# Patient Record
Sex: MALE | URBAN - METROPOLITAN AREA
[De-identification: names, ages, dates, MRNs, and addresses within clinical notes are randomized per-mention and may not be internally consistent; named-entity substitution may affect disease eponyms.]

---

## 2021-03-11 ENCOUNTER — EMERGENCY (EMERGENCY)
Facility: HOSPITAL | Age: 61
LOS: 1 days | Discharge: HOME | End: 2021-03-11
Attending: EMERGENCY MEDICINE
Payer: MEDICARE

## 2021-03-11 VITALS
HEART RATE: 72 BPM | TEMPERATURE: 98 F | SYSTOLIC BLOOD PRESSURE: 192 MMHG | DIASTOLIC BLOOD PRESSURE: 110 MMHG | RESPIRATION RATE: 22 BRPM | HEIGHT: 67 IN

## 2021-03-11 LAB
ALBUMIN SERPL ELPH-MCNC: 4.1 G/DL — SIGNIFICANT CHANGE UP (ref 3.3–5.2)
ALP SERPL-CCNC: 59 U/L — SIGNIFICANT CHANGE UP (ref 40–120)
ALT FLD-CCNC: 20 U/L — SIGNIFICANT CHANGE UP
ANION GAP SERPL CALC-SCNC: 14 MMOL/L — SIGNIFICANT CHANGE UP (ref 5–17)
AST SERPL-CCNC: 37 U/L — SIGNIFICANT CHANGE UP
BASOPHILS # BLD AUTO: 0.04 K/UL — SIGNIFICANT CHANGE UP (ref 0–0.2)
BASOPHILS NFR BLD AUTO: 0.5 % — SIGNIFICANT CHANGE UP (ref 0–2)
BILIRUB SERPL-MCNC: 0.6 MG/DL — SIGNIFICANT CHANGE UP (ref 0.4–2)
BUN SERPL-MCNC: 21 MG/DL — HIGH (ref 8–20)
CALCIUM SERPL-MCNC: 8.8 MG/DL — SIGNIFICANT CHANGE UP (ref 8.6–10.2)
CHLORIDE SERPL-SCNC: 101 MMOL/L — SIGNIFICANT CHANGE UP (ref 98–107)
CO2 SERPL-SCNC: 27 MMOL/L — SIGNIFICANT CHANGE UP (ref 22–29)
CREAT SERPL-MCNC: 1.2 MG/DL — SIGNIFICANT CHANGE UP (ref 0.5–1.3)
D DIMER BLD IA.RAPID-MCNC: 323 NG/ML DDU — HIGH
EOSINOPHIL # BLD AUTO: 0.11 K/UL — SIGNIFICANT CHANGE UP (ref 0–0.5)
EOSINOPHIL NFR BLD AUTO: 1.4 % — SIGNIFICANT CHANGE UP (ref 0–6)
GLUCOSE SERPL-MCNC: 114 MG/DL — HIGH (ref 70–99)
HCT VFR BLD CALC: 47.3 % — SIGNIFICANT CHANGE UP (ref 39–50)
HGB BLD-MCNC: 15.6 G/DL — SIGNIFICANT CHANGE UP (ref 13–17)
IMM GRANULOCYTES NFR BLD AUTO: 0.5 % — SIGNIFICANT CHANGE UP (ref 0–1.5)
LYMPHOCYTES # BLD AUTO: 0.92 K/UL — LOW (ref 1–3.3)
LYMPHOCYTES # BLD AUTO: 11.4 % — LOW (ref 13–44)
MAGNESIUM SERPL-MCNC: 1.9 MG/DL — SIGNIFICANT CHANGE UP (ref 1.6–2.6)
MCHC RBC-ENTMCNC: 29.6 PG — SIGNIFICANT CHANGE UP (ref 27–34)
MCHC RBC-ENTMCNC: 33 GM/DL — SIGNIFICANT CHANGE UP (ref 32–36)
MCV RBC AUTO: 89.8 FL — SIGNIFICANT CHANGE UP (ref 80–100)
MONOCYTES # BLD AUTO: 0.76 K/UL — SIGNIFICANT CHANGE UP (ref 0–0.9)
MONOCYTES NFR BLD AUTO: 9.4 % — SIGNIFICANT CHANGE UP (ref 2–14)
NEUTROPHILS # BLD AUTO: 6.2 K/UL — SIGNIFICANT CHANGE UP (ref 1.8–7.4)
NEUTROPHILS NFR BLD AUTO: 76.8 % — SIGNIFICANT CHANGE UP (ref 43–77)
NT-PROBNP SERPL-SCNC: 956 PG/ML — HIGH (ref 0–300)
PLATELET # BLD AUTO: 213 K/UL — SIGNIFICANT CHANGE UP (ref 150–400)
POTASSIUM SERPL-MCNC: 3.6 MMOL/L — SIGNIFICANT CHANGE UP (ref 3.5–5.3)
POTASSIUM SERPL-SCNC: 3.6 MMOL/L — SIGNIFICANT CHANGE UP (ref 3.5–5.3)
PROT SERPL-MCNC: 7.1 G/DL — SIGNIFICANT CHANGE UP (ref 6.6–8.7)
RBC # BLD: 5.27 M/UL — SIGNIFICANT CHANGE UP (ref 4.2–5.8)
RBC # FLD: 13.4 % — SIGNIFICANT CHANGE UP (ref 10.3–14.5)
SODIUM SERPL-SCNC: 142 MMOL/L — SIGNIFICANT CHANGE UP (ref 135–145)
TROPONIN T SERPL-MCNC: <0.01 NG/ML — SIGNIFICANT CHANGE UP (ref 0–0.06)
WBC # BLD: 8.07 K/UL — SIGNIFICANT CHANGE UP (ref 3.8–10.5)
WBC # FLD AUTO: 8.07 K/UL — SIGNIFICANT CHANGE UP (ref 3.8–10.5)

## 2021-03-11 PROCEDURE — 99285 EMERGENCY DEPT VISIT HI MDM: CPT | Mod: CS

## 2021-03-11 PROCEDURE — 71045 X-RAY EXAM CHEST 1 VIEW: CPT | Mod: 26

## 2021-03-11 PROCEDURE — 93010 ELECTROCARDIOGRAM REPORT: CPT

## 2021-03-11 NOTE — ED PROVIDER NOTE - OBJECTIVE STATEMENT
60yom w/ paroxysmal AF, CHF, HTN, p/w palpitations. Pt was standing at the bus stop waiting for his bus and had a sudden onset of palpitations in the chest with shortness of breath and lightheadedness. Symptoms provoked by life stress, he reports. Lasted somewhere around and hour and resolved after taking his prescribed metoprolol. He denies any symptoms currently.

## 2021-03-11 NOTE — ED PROVIDER NOTE - PATIENT PORTAL LINK FT
You can access the FollowMyHealth Patient Portal offered by [facility] by registering at the following website: [email or web address]. By joining "[employer]’s FollowMyHealth portal, you will also be able to view your health information using other applications (apps) compatible with our system.

## 2021-03-11 NOTE — ED ADULT TRIAGE NOTE - CHIEF COMPLAINT QUOTE
pt states he was standing waiting for a bus and felt his heart was racing. pt states he got a little lightheaded when his heart was racing with acid reflex.

## 2021-03-11 NOTE — ED PROVIDER NOTE - NSFOLLOWUPINSTRUCTIONS_ED_ALL_ED_FT
Follow up with your cardiologist as soon as possible  Continue your home medications as prescribed.   Return to the ER with any new, worsening or persistent symptoms.

## 2021-03-11 NOTE — ED PROVIDER NOTE - CLINICAL SUMMARY MEDICAL DECISION MAKING FREE TEXT BOX
Resolved episode of palpitations, in sinus now and asymptomatic. ECG non-ischemic appearing, troponin negative x 2. Possibly had an episode of paroxysmal AF and self treated with metoprolol. Will follow up with his cardiologist.

## 2021-03-12 ENCOUNTER — EMERGENCY (EMERGENCY)
Facility: HOSPITAL | Age: 61
LOS: 1 days | Discharge: HOME | End: 2021-03-12
Attending: EMERGENCY MEDICINE
Payer: MEDICARE

## 2021-03-12 VITALS
SYSTOLIC BLOOD PRESSURE: 169 MMHG | RESPIRATION RATE: 16 BRPM | DIASTOLIC BLOOD PRESSURE: 108 MMHG | WEIGHT: 220.02 LBS | TEMPERATURE: 100 F | HEART RATE: 95 BPM | HEIGHT: 67 IN | OXYGEN SATURATION: 96 %

## 2021-03-12 VITALS
OXYGEN SATURATION: 98 % | RESPIRATION RATE: 20 BRPM | HEART RATE: 72 BPM | SYSTOLIC BLOOD PRESSURE: 155 MMHG | DIASTOLIC BLOOD PRESSURE: 85 MMHG

## 2021-03-12 LAB
ALBUMIN SERPL ELPH-MCNC: 3.9 G/DL — SIGNIFICANT CHANGE UP (ref 3.3–5.2)
ALP SERPL-CCNC: 56 U/L — SIGNIFICANT CHANGE UP (ref 40–120)
ALT FLD-CCNC: 22 U/L — SIGNIFICANT CHANGE UP
ANION GAP SERPL CALC-SCNC: 20 MMOL/L — HIGH (ref 5–17)
AST SERPL-CCNC: 44 U/L — HIGH
BASOPHILS # BLD AUTO: 0.02 K/UL — SIGNIFICANT CHANGE UP (ref 0–0.2)
BASOPHILS NFR BLD AUTO: 0.3 % — SIGNIFICANT CHANGE UP (ref 0–2)
BILIRUB SERPL-MCNC: 0.6 MG/DL — SIGNIFICANT CHANGE UP (ref 0.4–2)
BUN SERPL-MCNC: 18 MG/DL — SIGNIFICANT CHANGE UP (ref 8–20)
CALCIUM SERPL-MCNC: 8.8 MG/DL — SIGNIFICANT CHANGE UP (ref 8.6–10.2)
CHLORIDE SERPL-SCNC: 102 MMOL/L — SIGNIFICANT CHANGE UP (ref 98–107)
CO2 SERPL-SCNC: 22 MMOL/L — SIGNIFICANT CHANGE UP (ref 22–29)
CREAT SERPL-MCNC: 0.98 MG/DL — SIGNIFICANT CHANGE UP (ref 0.5–1.3)
EOSINOPHIL # BLD AUTO: 0.12 K/UL — SIGNIFICANT CHANGE UP (ref 0–0.5)
EOSINOPHIL NFR BLD AUTO: 2 % — SIGNIFICANT CHANGE UP (ref 0–6)
GLUCOSE SERPL-MCNC: 111 MG/DL — HIGH (ref 70–99)
HCT VFR BLD CALC: 44.5 % — SIGNIFICANT CHANGE UP (ref 39–50)
HGB BLD-MCNC: 14.7 G/DL — SIGNIFICANT CHANGE UP (ref 13–17)
IMM GRANULOCYTES NFR BLD AUTO: 0.7 % — SIGNIFICANT CHANGE UP (ref 0–1.5)
LYMPHOCYTES # BLD AUTO: 0.7 K/UL — LOW (ref 1–3.3)
LYMPHOCYTES # BLD AUTO: 11.9 % — LOW (ref 13–44)
MCHC RBC-ENTMCNC: 29.8 PG — SIGNIFICANT CHANGE UP (ref 27–34)
MCHC RBC-ENTMCNC: 33 GM/DL — SIGNIFICANT CHANGE UP (ref 32–36)
MCV RBC AUTO: 90.3 FL — SIGNIFICANT CHANGE UP (ref 80–100)
MONOCYTES # BLD AUTO: 0.52 K/UL — SIGNIFICANT CHANGE UP (ref 0–0.9)
MONOCYTES NFR BLD AUTO: 8.8 % — SIGNIFICANT CHANGE UP (ref 2–14)
NEUTROPHILS # BLD AUTO: 4.48 K/UL — SIGNIFICANT CHANGE UP (ref 1.8–7.4)
NEUTROPHILS NFR BLD AUTO: 76.3 % — SIGNIFICANT CHANGE UP (ref 43–77)
NT-PROBNP SERPL-SCNC: 753 PG/ML — HIGH (ref 0–300)
PLATELET # BLD AUTO: 205 K/UL — SIGNIFICANT CHANGE UP (ref 150–400)
POTASSIUM SERPL-MCNC: 4.2 MMOL/L — SIGNIFICANT CHANGE UP (ref 3.5–5.3)
POTASSIUM SERPL-SCNC: 4.2 MMOL/L — SIGNIFICANT CHANGE UP (ref 3.5–5.3)
PROT SERPL-MCNC: 6.9 G/DL — SIGNIFICANT CHANGE UP (ref 6.6–8.7)
RBC # BLD: 4.93 M/UL — SIGNIFICANT CHANGE UP (ref 4.2–5.8)
RBC # FLD: 13.4 % — SIGNIFICANT CHANGE UP (ref 10.3–14.5)
SARS-COV-2 RNA SPEC QL NAA+PROBE: SIGNIFICANT CHANGE UP
SODIUM SERPL-SCNC: 143 MMOL/L — SIGNIFICANT CHANGE UP (ref 135–145)
T4 AB SER-ACNC: 3.6 UG/DL — LOW (ref 4.5–12)
TROPONIN T SERPL-MCNC: <0.01 NG/ML — SIGNIFICANT CHANGE UP (ref 0–0.06)
TSH SERPL-MCNC: 2.05 UIU/ML — SIGNIFICANT CHANGE UP (ref 0.27–4.2)
TSH SERPL-MCNC: 3.23 UIU/ML — SIGNIFICANT CHANGE UP (ref 0.27–4.2)
WBC # BLD: 5.88 K/UL — SIGNIFICANT CHANGE UP (ref 3.8–10.5)
WBC # FLD AUTO: 5.88 K/UL — SIGNIFICANT CHANGE UP (ref 3.8–10.5)

## 2021-03-12 PROCEDURE — 85379 FIBRIN DEGRADATION QUANT: CPT

## 2021-03-12 PROCEDURE — 71045 X-RAY EXAM CHEST 1 VIEW: CPT | Mod: 26

## 2021-03-12 PROCEDURE — 93971 EXTREMITY STUDY: CPT | Mod: 26,LT

## 2021-03-12 PROCEDURE — U0003: CPT

## 2021-03-12 PROCEDURE — 99284 EMERGENCY DEPT VISIT MOD MDM: CPT | Mod: 25

## 2021-03-12 PROCEDURE — 80053 COMPREHEN METABOLIC PANEL: CPT

## 2021-03-12 PROCEDURE — 93005 ELECTROCARDIOGRAM TRACING: CPT

## 2021-03-12 PROCEDURE — 36415 COLL VENOUS BLD VENIPUNCTURE: CPT

## 2021-03-12 PROCEDURE — 99284 EMERGENCY DEPT VISIT MOD MDM: CPT

## 2021-03-12 PROCEDURE — 71045 X-RAY EXAM CHEST 1 VIEW: CPT

## 2021-03-12 PROCEDURE — 93880 EXTRACRANIAL BILAT STUDY: CPT | Mod: 26

## 2021-03-12 PROCEDURE — U0005: CPT

## 2021-03-12 PROCEDURE — 85025 COMPLETE CBC W/AUTO DIFF WBC: CPT

## 2021-03-12 PROCEDURE — 93010 ELECTROCARDIOGRAM REPORT: CPT

## 2021-03-12 PROCEDURE — 84484 ASSAY OF TROPONIN QUANT: CPT

## 2021-03-12 PROCEDURE — 99220: CPT | Mod: GC

## 2021-03-12 PROCEDURE — 83735 ASSAY OF MAGNESIUM: CPT

## 2021-03-12 PROCEDURE — 93306 TTE W/DOPPLER COMPLETE: CPT | Mod: 26

## 2021-03-12 PROCEDURE — 83880 ASSAY OF NATRIURETIC PEPTIDE: CPT

## 2021-03-12 RX ORDER — METOPROLOL TARTRATE 50 MG
100 TABLET ORAL ONCE
Refills: 0 | Status: COMPLETED | OUTPATIENT
Start: 2021-03-12 | End: 2021-03-12

## 2021-03-12 RX ORDER — METOPROLOL TARTRATE 50 MG
100 TABLET ORAL DAILY
Refills: 0 | Status: DISCONTINUED | OUTPATIENT
Start: 2021-03-12 | End: 2021-03-12

## 2021-03-12 RX ORDER — CLONAZEPAM 1 MG
0.5 TABLET ORAL EVERY 12 HOURS
Refills: 0 | Status: DISCONTINUED | OUTPATIENT
Start: 2021-03-12 | End: 2021-03-13

## 2021-03-12 RX ORDER — SACUBITRIL AND VALSARTAN 24; 26 MG/1; MG/1
1 TABLET, FILM COATED ORAL
Refills: 0 | Status: DISCONTINUED | OUTPATIENT
Start: 2021-03-12 | End: 2021-03-16

## 2021-03-12 RX ORDER — ATORVASTATIN CALCIUM 80 MG/1
20 TABLET, FILM COATED ORAL AT BEDTIME
Refills: 0 | Status: DISCONTINUED | OUTPATIENT
Start: 2021-03-12 | End: 2021-03-16

## 2021-03-12 RX ORDER — METOPROLOL TARTRATE 50 MG
200 TABLET ORAL DAILY
Refills: 0 | Status: DISCONTINUED | OUTPATIENT
Start: 2021-03-13 | End: 2021-03-16

## 2021-03-12 RX ORDER — APIXABAN 2.5 MG/1
5 TABLET, FILM COATED ORAL EVERY 12 HOURS
Refills: 0 | Status: DISCONTINUED | OUTPATIENT
Start: 2021-03-12 | End: 2021-03-16

## 2021-03-12 RX ORDER — SERTRALINE 25 MG/1
25 TABLET, FILM COATED ORAL
Refills: 0 | Status: DISCONTINUED | OUTPATIENT
Start: 2021-03-12 | End: 2021-03-16

## 2021-03-12 RX ORDER — FUROSEMIDE 40 MG
40 TABLET ORAL DAILY
Refills: 0 | Status: DISCONTINUED | OUTPATIENT
Start: 2021-03-12 | End: 2021-03-16

## 2021-03-12 RX ORDER — POTASSIUM CHLORIDE 20 MEQ
20 PACKET (EA) ORAL DAILY
Refills: 0 | Status: DISCONTINUED | OUTPATIENT
Start: 2021-03-12 | End: 2021-03-16

## 2021-03-12 RX ADMIN — Medication 100 MILLIGRAM(S): at 18:09

## 2021-03-12 RX ADMIN — Medication 20 MILLIEQUIVALENT(S): at 18:07

## 2021-03-12 RX ADMIN — Medication 40 MILLIGRAM(S): at 18:08

## 2021-03-12 RX ADMIN — Medication 0.5 MILLIGRAM(S): at 18:10

## 2021-03-12 RX ADMIN — SERTRALINE 25 MILLIGRAM(S): 25 TABLET, FILM COATED ORAL at 22:07

## 2021-03-12 NOTE — ED ADULT NURSE NOTE - OBJECTIVE STATEMENT
Pt received A&Ox4 c/o sudden onset of palpitations in generalized chest associated with SOB & lightheadedness. Pt denies any chest pain, HA, dizziness, NVD,  symptoms. Pt placed on CM w/. Respirations even & unlabored. NAD present.

## 2021-03-12 NOTE — ED CDU PROVIDER INITIAL DAY NOTE - PROGRESS NOTE DETAILS
Pt resting comfortably, denies cp or palpitations at this time. US carotids unremarkable, US duplex negative for dvt, results d/w pt. Pt requesting social work be contacted regarding his housing issue. I spoke to Edith ARBOLEDA who states NKECH[facility] is aware and she will sign patient off to morning  as well.

## 2021-03-12 NOTE — ED ADULT NURSE REASSESSMENT NOTE - NSIMPLEMENTINTERV_GEN_ALL_ED
Implemented All Universal Safety Interventions:  [place] to call system. Call bell, personal items and telephone within reach. Instruct patient to call for assistance. Room bathroom lighting operational. Non-slip footwear when patient is off stretcher. Physically safe environment: no spills, clutter or unnecessary equipment. Stretcher in lowest position, wheels locked, appropriate side rails in place.

## 2021-03-12 NOTE — CONSULT NOTE ADULT - ASSESSMENT
59 y/o male with PMH of alcoholism (one pint vodka per day) obesity Depression, active smoker, Htn, HLD, CAD, Cardiomyopathy/CHF. Atrial fib who presents to ER last evening with complaints of chest pain described as palpitations.  He was feeling better and went home only to represent with palpitations which lasted one hour.  He felt lightheaded with the event but no syncope.   He states he feels fine now States he has been compliant with all his meds except only taking 1/2 of Eliquis due to fear of GI bleeding.  He walks up a flight of stairs with groceries and does not get sob or chestpain.    Atypical chest Pain/ Palpitations  likely recurrent atrial fib  Restart eliquis at 5 bid  would check TSH  b/p last night was 190/110  now 169/84  Patient did take meds this am  hr in 70-80  would give additional dose of Toprol 100 now and increase daily dose to 200mg qd  Trying to get records from [place]   Obtain echo  hold in CDU if echo ok , b/p ok and no further arrhythmia d/c tomorrow    CHF/CARDIOMYOPATHY  c/w entresto, Lasix  low na diet    HTN  would increase toprol to 200 qd  Low na diet    ETOH ABUSE/TOBACCO USE  discussed etoh and tobacco use with patient and benefits of cessation    PLEASE HAVE  SEE HIM PRIOR TO D/C     61 y/o male with PMH of alcoholism (one pint vodka per day) obesity Depression, active smoker, Htn, HLD,  Mild CAD, Cardiomyopathy/CHF. Severe Pul htn Atrial fib who presents to ER last evening with complaints of chest pain described as palpitations.  He was feeling better and went home only to represent with palpitations which lasted one hour.  He felt lightheaded with the event but no syncope.   He states he feels fine now States he has been compliant with all his meds except only taking 1/2 of Eliquis due to fear of GI bleeding.  He walks up a flight of stairs with groceries and does not get sob or chestpain.    ATYPICAL CHEST PAIN/PALPITATIONS  Trop neg x 3  likely recurrent atrial fib  Restart eliquis at 5 bid  would check TSH  b/p last night was 190/110  now 169/84  Patient did take meds this am  hr in 70-80  would give additional dose of Toprol 100 now and increase daily dose to 200mg qd  Trying to get records from [place]   Obtain echo  hold in CDU if echo ok , b/p ok and no further arrhythmia d/c tomorrow    CHF/CARDIOMYOPATHY  c/w entresto, Lasix  low na diet    HTN  extr toprol 100 now  would increase toprol to 200 qd starting in am  Low na diet    ETOH ABUSE/TOBACCO USE  discussed etoh and tobacco use with patient and benefits of cessation    PLEASE HAVE  SEE HIM PRIOR TO D/C

## 2021-03-12 NOTE — ED ADULT NURSE NOTE - CAS ELECT INFOMATION PROVIDED
DC instructions provided and reviewed with patient by JAJA Quiroga. VSS. Ambulatory. Patient in understanding of all dc instructions. Patient with no further questions for the RN./DC instructions

## 2021-03-12 NOTE — ED PROVIDER NOTE - ATTENDING CONTRIBUTION TO CARE
Pertinent PMH/PSH/FHx/SHx and Review of Systems contained within:  Patient presents to the ED for episode of palpations with associated lightheadedness.  Patient here last night for the same.  Work up without acute abnormality at that time and patient discharged.  PMH of parox afib, CHF, HLD, HTN on eliquis.  Only takes "half the pill" because of distant GI bleed (patient chose to do this without medical guidance).  VSS.  Today with repeat episode.  Non toxic.  Well appearing. No aggravating or relieving factors. No other pertinent PMH.  No other pertinent PSH.  No other pertinent FHx.  Patient denies EtOH/tobacco/illicit substance use. No fever/chills, No photophobia/eye pain/changes in vision, No ear pain/sore throat/dysphagia, No chest pain, no SOB/cough/wheeze/stridor, No abdominal pain, No N/V/D, no dysuria/frequency/discharge, No neck/back pain, no rash, no changes in neurological status/function.     Gen: Alert, NAD  Head: NC, AT, PERRL, EOMI, normal lids/conjunctiva  ENT: normal hearing, patent oropharynx without erythema/exudate, uvula midline  Neck: +supple, no tenderness/meningismus/JVD, +Trachea midline  Pulm: Bilateral BS, normal resp effort, no wheeze/stridor/retractions  CV: RRR, no M/R/G, +dist pulses  Abd: soft, NT/ND, +BS, no hepatosplenomegaly  Mskel: no edema/erythema/cyanosis  Skin: no rash  Neuro: AAOx3, no gross sensory/motor deficits,    Patient with second episode of palpitations with near syncope with known hx of parox afib.  No afib in ED.  Lab values do not require emergent intervention. COVID negative from swab on 3/11.  d/w Dr. Allen and will transfer to obs for cardio consult and echo with continuous telemetry for pathologic rhythm.  VSS.  Uneventful ED observation period.

## 2021-03-12 NOTE — ED CDU PROVIDER INITIAL DAY NOTE - PMH
Atrial fibrillation    CHF (congestive heart failure)    Depression, unspecified depression type    HTN (hypertension)
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
## 2021-03-12 NOTE — ED PROVIDER NOTE - OBJECTIVE STATEMENT
59yo M w/pmh CHF, afib on Eliquis, HTN, HLD presents with palpitations. Reports pounding in his chest starting at 9am, severe for a few minutes, improved but lingering for the past 2 hours. Assoc/w diaphoresis. Denies f/ch, cough, SOB, n/v, abdominal pain, leg swelling. Presented yesterday for the same symptoms, denies any similar symptoms prior to yesterday. Reports this is different from his afib symptoms, for which he presented a year ago with dizziness and syncope. Denies any hx of dvt/pe, recent travel, recent surgery. 61yo M w/pmh CHF, afib on Eliquis, HTN, HLD presents with palpitations. Reports pounding in his chest starting at 9am, severe for a few minutes, improved but lingering for the past 2 hours. Assoc/w lightheadedness and diaphoresis. Denies f/ch, cough, SOB, n/v, abdominal pain, leg swelling. Presented yesterday for the same symptoms, denies any similar symptoms prior to yesterday. Reports this is different from his afib symptoms, for which he presented a year ago with dizziness and syncope. Denies any hx of dvt/pe, recent travel, recent surgery.

## 2021-03-12 NOTE — CONSULT NOTE ADULT - ATTENDING COMMENTS
60M history significant for chronic active smoker and EtOH abuse (drinks daily) with reported HFrEF (on Entresto), pAfib (self decided to take only half dose Eliquis due to prior GI bleed/ulcer 6 months ago at [place] s/p cautery?), reports only does telephone visit with an outside cardiologist (Dr. Akash Hinson?) last phone visit >8 months ago, presents to ER last evening with L-sided chest "beating strong" and return back this afternoon for the same, serial Trop and EKG negative, monitored in observation,doubt compliance with meds as with /108.   -advised need for alcohol abstinent given reported HFrEF and pAfib; CIWA at risk for withdrawal  -await records from [place] if available  -can obtain TTE for structural heart function, clinical exam and pBNP without acute CHF- continue home meds of Entresto, metoprolol succinate and PO Lasix, if LV EF <40% can add spironolactone 25mg as well, uptitrate Entresto dose as BP allows  -advised to resume full dose BID Eliquis as H/H stable   -if no significant arrhythmia on telemetry in observation for 24hs then discharge home to establish care with our office If he chooses to      Ji Surendra Patel DO, [facility]  Faculty Non-Invasive Cardiologist  [phone]

## 2021-03-12 NOTE — ED CDU PROVIDER INITIAL DAY NOTE - MEDICAL DECISION MAKING DETAILS
59yo M w/pmh CHF, afib on Eliquis, HTN, HLD , and anxiety and depression presents with palpitations and left side of the chest pounding . Reports pounding in his chest starting at 9am, severe for a few minutes. pt is comes in ER for second time   consult [facility] card since pt's cardiology dose not  come n [facility]   trop at 1700  home med   telemetry   doppler carotid and LLE doppler ,   Echo

## 2021-03-12 NOTE — ED CDU PROVIDER INITIAL DAY NOTE - PHYSICAL EXAMINATION
Const: AOX3 nontoxic appearing, no apparent respiratory or physical distress. mild anxious   HEENT: NC/AT. Moist mucous membranee  Eyes: TRUPTI. EOMI  Neck: Soft and supple. Full ROM without pain.  Cardiac: Regular rate and regular rhythm. +S1/S2. No murmurs. mild left LE mild swollen minimal compare to the right no calf TTP .  Resp: Speaking in full sentences. No evidence of respiratory distress. No wheezes, rales or rhonchi. No adventitious breath sounds   Abd: Soft, non-tender, non-distended. Normal bowel sounds in all 4 quadrants. No guarding or rebound.  Back: Spine midline and non-tender. No CVAT.  Skin: No rashes, abrasions or lacerations.  Lymph: No cervical lymphadenopathy.  Neuro: Awake, alert & oriented x 3. Moves all extremities symmetrically.
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
## 2021-03-12 NOTE — ED ADULT NURSE NOTE - OBJECTIVE STATEMENT
Pt A&OX3, amb ad yari, c/o chest pain aprox at 1000 today with mild sob.  Pt states he is very stressed out due to living situation.  Pt Pt A&OX3, amb ad yari, c/o chest pain aprox at 1000 today with mild sob.  Pt states he is very stressed out due to living situation.  CM in place, NSR.  VSS.  Clear bsb, abd soft nondistended, nontender, moving all ext well.

## 2021-03-12 NOTE — ED PROVIDER NOTE - CLINICAL SUMMARY MEDICAL DECISION MAKING FREE TEXT BOX
61yo M w/pmh CHF, afib, HTN, HLD presents with palpitations and diaphoresis, denies CP or SOB, benign exam. EKG shows NSR. Labs, CXR pending.

## 2021-03-12 NOTE — ED ADULT NURSE NOTE - NS ED NURSE RECORD ANOTHER VITAL SIGN
Future Appointments       Provider Department Center    7/1/2019 11:05 AM Emily Rowan P.A.-C. [facility]    7/25/2019 8:45 AM Ghazala Polk [facility]    10/21/2019 8:05 AM Emily Rowan P.A.-C. [facility]        ESTABLISHED PATIENT PRE-VISIT PLANNING     Patient was contacted to complete PVP.     Note: Patient will not be contacted if there is no indication to call.     1.  Reviewed notes from the last few office visits within the medical group: Yes    2.  If any orders were placed at last visit or intended to be done for this visit (i.e. 6 mos follow-up), do we have Results/Consult Notes?        •  Labs - Labs ordered, NOT completed. Patient advised to complete prior to next appointment.   Note: If patient appointment is for lab review and patient did not complete labs, check with provider if OK to reschedule patient until labs completed.       •  Imaging - Imaging was not ordered at last office visit.       •  Referrals - No referrals were ordered at last office visit.    3. Is this appointment scheduled as a Hospital Follow-Up? No    4.  Immunizations were updated in Epic using WebIZ?: No WebIZ record       •  Web Iz Recommendations: HEPATITIS B, PREVNAR (PCV13) , TDAP and SHINGRIX (Shingles)    5.  Patient is due for the following Health Maintenance Topics:   Health Maintenance Due   Topic Date Due   • IMM HEP B VACCINE (1 of 3 - Risk 3-dose series) 05/09/1964   • IMM DTaP/Tdap/Td Vaccine (1 - Tdap) 05/09/1964   • IMM ZOSTER VACCINES (1 of 2) 05/09/1994   • IMM PNEUMOCOCCAL(1 of 2 - PCV13) 05/09/2009   • SERUM CREATININE  04/03/2019       6. Orders for overdue Health Maintenance topics pended in Pre-Charting? YES    7.  AHA (MDX) form printed for Provider? No, already completed    8.  Patient was NOT informed to arrive 15 min prior to their scheduled appointment and bring in their medication  bottles.   Yes

## 2021-03-12 NOTE — ED CDU PROVIDER INITIAL DAY NOTE - ATTENDING CONTRIBUTION TO CARE
I, Tai Khan, performed a face to face bedside interview with this patient regarding history of present illness, review of symptoms and relevant past medical, social and family history.  I completed an independent physical examination. I have communicated the patient’s plan of care and disposition with the ACP.  60 year old male with PMH afib on eliquis, htn, chf presents with chest pain and palpitations. States Sx started last night, episode of heart racing, chest discomfort and near syncope, seen here, trop neg x 2 and discharged. Sx recurred again today and he returns. no chest pain currently. placed on obs for tele, echo, cardio consult  Gen: NAD, well appearing  CV: RRR  Pul: CTA b/l  Abd: Soft, non-distended, non-tender  Neuro: no focal deficits

## 2021-03-12 NOTE — ED CDU PROVIDER INITIAL DAY NOTE - NS ED ROS FT
· Review of Systems: Constitutional: no fever, sweats, and no chills.  	CV: no chest pain, no edema. +palpitations  	Resp: no cough, no dyspnea  	GI: no abdominal pain, no nausea and no vomiting.  	MSK: no msk pain, no weakness  	Skin: no lesions, and no rashes.  	Neuro: no LOC, no headache, no sensory deficits, and +lightheadedness  ROS otherwise negative except as noted in HPI.

## 2021-03-12 NOTE — ED PROVIDER NOTE - NS ED ROS FT
Constitutional: no fever, sweats, and no chills.  CV: no chest pain, no edema. +palpitations  Resp: no cough, no dyspnea  GI: no abdominal pain, no nausea and no vomiting.  MSK: no msk pain, no weakness  Skin: no lesions, and no rashes.  Neuro: no LOC, no headache, no sensory deficits, and no dizziness  ROS otherwise negative except as noted in HPI. Constitutional: no fever, sweats, and no chills.  CV: no chest pain, no edema. +palpitations  Resp: no cough, no dyspnea  GI: no abdominal pain, no nausea and no vomiting.  MSK: no msk pain, no weakness  Skin: no lesions, and no rashes.  Neuro: no LOC, no headache, no sensory deficits, and +lightheadedness  ROS otherwise negative except as noted in HPI.

## 2021-03-13 VITALS
HEART RATE: 68 BPM | OXYGEN SATURATION: 95 % | TEMPERATURE: 98 F | DIASTOLIC BLOOD PRESSURE: 93 MMHG | RESPIRATION RATE: 18 BRPM | SYSTOLIC BLOOD PRESSURE: 159 MMHG

## 2021-03-13 PROCEDURE — 93005 ELECTROCARDIOGRAM TRACING: CPT

## 2021-03-13 PROCEDURE — 80053 COMPREHEN METABOLIC PANEL: CPT

## 2021-03-13 PROCEDURE — 84436 ASSAY OF TOTAL THYROXINE: CPT

## 2021-03-13 PROCEDURE — 84443 ASSAY THYROID STIM HORMONE: CPT

## 2021-03-13 PROCEDURE — G0378: CPT

## 2021-03-13 PROCEDURE — 84484 ASSAY OF TROPONIN QUANT: CPT

## 2021-03-13 PROCEDURE — 93880 EXTRACRANIAL BILAT STUDY: CPT

## 2021-03-13 PROCEDURE — 99215 OFFICE O/P EST HI 40 MIN: CPT

## 2021-03-13 PROCEDURE — 99217: CPT

## 2021-03-13 PROCEDURE — 99284 EMERGENCY DEPT VISIT MOD MDM: CPT | Mod: 25

## 2021-03-13 PROCEDURE — 71045 X-RAY EXAM CHEST 1 VIEW: CPT

## 2021-03-13 PROCEDURE — 93971 EXTREMITY STUDY: CPT

## 2021-03-13 PROCEDURE — U0003: CPT

## 2021-03-13 PROCEDURE — 93306 TTE W/DOPPLER COMPLETE: CPT

## 2021-03-13 PROCEDURE — 36000 PLACE NEEDLE IN VEIN: CPT | Mod: XU

## 2021-03-13 PROCEDURE — 36415 COLL VENOUS BLD VENIPUNCTURE: CPT

## 2021-03-13 PROCEDURE — 83880 ASSAY OF NATRIURETIC PEPTIDE: CPT

## 2021-03-13 PROCEDURE — U0005: CPT

## 2021-03-13 PROCEDURE — 85025 COMPLETE CBC W/AUTO DIFF WBC: CPT

## 2021-03-13 RX ORDER — APIXABAN 2.5 MG/1
1 TABLET, FILM COATED ORAL
Qty: 60 | Refills: 0
Start: 2021-03-13 | End: 2021-04-11

## 2021-03-13 RX ORDER — METOPROLOL TARTRATE 50 MG
1 TABLET ORAL
Qty: 30 | Refills: 0
Start: 2021-03-13 | End: 2021-04-11

## 2021-03-13 RX ORDER — SACUBITRIL AND VALSARTAN 24; 26 MG/1; MG/1
1 TABLET, FILM COATED ORAL
Qty: 60 | Refills: 0
Start: 2021-03-13 | End: 2021-04-11

## 2021-03-13 RX ADMIN — Medication 0.5 MILLIGRAM(S): at 07:28

## 2021-03-13 RX ADMIN — Medication 40 MILLIGRAM(S): at 05:21

## 2021-03-13 RX ADMIN — SERTRALINE 25 MILLIGRAM(S): 25 TABLET, FILM COATED ORAL at 05:21

## 2021-03-13 RX ADMIN — Medication 200 MILLIGRAM(S): at 05:23

## 2021-03-13 RX ADMIN — APIXABAN 5 MILLIGRAM(S): 2.5 TABLET, FILM COATED ORAL at 05:21

## 2021-03-13 RX ADMIN — SERTRALINE 25 MILLIGRAM(S): 25 TABLET, FILM COATED ORAL at 14:30

## 2021-03-13 RX ADMIN — SACUBITRIL AND VALSARTAN 1 TABLET(S): 24; 26 TABLET, FILM COATED ORAL at 05:21

## 2021-03-13 RX ADMIN — Medication 20 MILLIEQUIVALENT(S): at 14:27

## 2021-03-13 RX ADMIN — Medication 1 MILLIGRAM(S): at 14:49

## 2021-03-13 NOTE — ED CDU PROVIDER DISPOSITION NOTE - NSFOLLOWUPINSTRUCTIONS_ED_ALL_ED_FT
take Eliquis 5mg 2x/day  Toprol 200 mg once a day  continue Lasix  Increase Entresto to 97/103 mg 2x/day  low sodium diet  follow up with cardiologist in office this week

## 2021-03-13 NOTE — ED CDU PROVIDER DISPOSITION NOTE - PATIENT PORTAL LINK FT
You can access the FollowMyHealth Patient Portal offered by [facility] by registering at the following website: [email or web address]. By joining [facility]’s FollowMyHealth portal, you will also be able to view your health information using other applications (apps) compatible with our system.

## 2021-03-13 NOTE — ED CDU PROVIDER DISPOSITION NOTE - ATTENDING CONTRIBUTION TO CARE
seen with acp  cleared medically SW arranged for shelter over the weekend and he will return to housing Monday  agree with dci

## 2021-03-13 NOTE — PROGRESS NOTE ADULT - ATTENDING COMMENTS
congestive heart failure . cannot add aldactone due to risk of elevated potassium.   ct entresto discussed with aptietn alcohol abuse counselling. need regular follow up  ct beta-blocker   No further in-patient cardiac work-up/management is needed.  Follow-up in cardiology office in 2 weeks.

## 2021-03-13 NOTE — ED ADULT NURSE REASSESSMENT NOTE - GENERAL PATIENT STATE
comfortable appearance/cooperative/resting/sleeping
comfortable appearance
comfortable appearance/cooperative/resting/sleeping
Klonopin given/anxious
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
## 2021-03-13 NOTE — ED ADULT NURSE REASSESSMENT NOTE - COMFORT CARE
po fluids offered/side rails up
plan of care explained/side rails up
plan of care explained/po fluids offered
ambulated to bathroom/meal provided/plan of care explained/po fluids offered/repositioned/wait time explained
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
## 2021-03-13 NOTE — ED CDU PROVIDER SUBSEQUENT DAY NOTE - PHYSICAL EXAMINATION
Gen: No acute distress, non toxic  HEENT: Mucous membranes moist, pink conjunctivae, EOMI  CV: irregularly irregular rhythm, nl s1/s2.  Resp: CTAB, normal rate and effort  GI: Abdomen soft, NT, ND. No rebound, no guarding  Neuro: A&O x 3, moving all 4 extremities  MSK: No spine or joint tenderness to palpation  Skin: No rashes. intact and perfused.

## 2021-03-13 NOTE — ED CDU PROVIDER SUBSEQUENT DAY NOTE - NS ED ROS FT
Gen: denies fever, chills, fatigue, weight loss  Skin: denies rashes, laceration, bruising  Respiratory: denies TAVAREZ, SOB, cough, wheezing  Cardiovascular: denies chest pain, palpitations, diaphoresis  GI: denies abdominal pain, n/v/d  : denies dysuria, frequency, urgency, bowel/bladder incontinence  MSK: denies joint swelling/pain, back pain, neck pain  Neuro: denies headache, dizziness, weakness, numbness  Psych: denies anxiety, depression, SI/HI

## 2021-03-13 NOTE — PROGRESS NOTE ADULT - SUBJECTIVE AND OBJECTIVE BOX
[place] [facility]                                                               Office: [address] [address]                                                              Telephone: [phone]. Fax:[phone]                                                                             PROGRESS NOTE  Reason for follow up:   Overnight: No new events.   Update:     Subjective: "  ______________________"      Review of symptoms:   Cardiac:  No chest pain. No dyspnea. No palpitations.  Respiratory:no cough. No dyspnea  Gastrointestinal: No diarrhea. No abdominal pain. No bleeding.   Neuro: No focal neuro complaints.      Vitals:  T(C): 36.5 (03-13-21 @ 07:20), Max: 37.3 (03-12-21 @ 17:37)  HR: 57 (03-13-21 @ 07:20) (57 - 76)  BP: 161/99 (03-13-21 @ 07:20) (158/98 - 165/93)  RR: 18 (03-13-21 @ 07:20) (18 - 19)  SpO2: 97% (03-13-21 @ 07:20) (95% - 98%)  Wt(kg): --  I&O's Summary    Weight (kg): 99.8 (03-12 @ 10:13)      PHYSICAL EXAM:  Appearance: Comfortable. No acute distress  HEENT:  Atraumatic. Normocephalic.  Normal oral mucosa, PERRL, Neck is supple. No carotid bruit.   Neurologic: A & O x 3, no focal deficits. EOMI.  Cardiovascular: Normal S1 S2, No murmur, rubs/gallops. No JVD, No edema  Respiratory: Lungs clear to auscultation, unlabored   Gastrointestinal:  Soft, Non-tender, + BS  Lower Extremities: No edema  Psychiatry: Patient is calm. No agitation. Mood & affect appropriate  Skin: No rashes/ ecchymoses/cyanosis/ulcers visualized on the face, hands or feet.      CURRENT MEDICATIONS:  furosemide    Tablet 40 milliGRAM(s) Oral daily  metoprolol succinate  milliGRAM(s) Oral daily  sacubitril 49 mG/valsartan 51 mG 1 Tablet(s) Oral two times a day    sertraline  atorvastatin  apixaban  potassium chloride    Tablet ER      DIAGNOSTIC TESTING:  [ ] Echocardiogram:   [ ]  Catheterization:  [ ] Stress Test:    OTHER: 	      LABS:	 	  CARDIAC MARKERS ( 12 Mar 2021 17:45 )  x     / <0.01 ng/mL / x     / x     / x      p-BNP 12 Mar 2021 17:45: x    , CARDIAC MARKERS ( 12 Mar 2021 12:40 )  x     / <0.01 ng/mL / x     / x     / x      p-BNP 12 Mar 2021 12:40: 753 pg/mL, CARDIAC MARKERS ( 11 Mar 2021 23:52 )  x     / <0.01 ng/mL / x     / x     / x      p-BNP 11 Mar 2021 23:52: x    , CARDIAC MARKERS ( 11 Mar 2021 20:39 )  x     / <0.01 ng/mL / x     / x     / x      p-BNP 11 Mar 2021 20:39: 956 pg/mL                          14.7   5.88  )-----------( 205      ( 12 Mar 2021 12:40 )             44.5     03-12    143  |  102  |  18.0  ----------------------------<  111<H>  4.2   |  22.0  |  0.98    Ca    8.8      12 Mar 2021 12:40  Mg     1.9     03-11    TPro  6.9  /  Alb  3.9  /  TBili  0.6  /  DBili  x   /  AST  44<H>  /  ALT  22  /  AlkPhos  56  03-12    proBNP: Serum Pro-Brain Natriuretic Peptide: 753 pg/mL (03-12 @ 12:40)  Serum Pro-Brain Natriuretic Peptide: 956 pg/mL (03-11 @ 20:39)    Lipid Profile:   HgA1c:   TSH: Thyroid Stimulating Hormone, Serum: 3.23 uIU/mL  Thyroid Stimulating Hormone, Serum: 2.05 uIU/mL        TELEMETRY: Reviewed    ECG:  Reviewed by me. 	                                                                      [place] [facility]                                                               Office: [address] [address]                                                              Telephone: [phone]. Fax:[phone]                                                                             PROGRESS NOTE  Reason for follow up: heart failure  Overnight: No new events.   Update: states feels ok. "in a bad place" homeless. Awaiting SW. States has been taking medicaitons       Review of symptoms:   Cardiac:  No chest pain. No dyspnea. No palpitations.  Respiratory:no cough. No dyspnea  Gastrointestinal: No diarrhea. No abdominal pain. No bleeding.   Neuro: No focal neuro complaints.      Vitals:  T(C): 36.5 (03-13-21 @ 07:20), Max: 37.3 (03-12-21 @ 17:37)  HR: 57 (03-13-21 @ 07:20) (57 - 76)  BP: 161/99 (03-13-21 @ 07:20) (158/98 - 165/93)  RR: 18 (03-13-21 @ 07:20) (18 - 19)  SpO2: 97% (03-13-21 @ 07:20) (95% - 98%)    Weight (kg): 99.8 (03-12 @ 10:13)      PHYSICAL EXAM:  Appearance: Comfortable. No acute distress  HEENT:  Atraumatic. Normocephalic.  Normal oral mucosa, PERRL, Neck is supple.  Neurologic: A & O x 3, no focal deficits. EOMI.  Cardiovascular: Normal S1 S2, No murmur, rubs/gallops. No JVD, No edema  Respiratory: Lungs clear to auscultation, unlabored   Gastrointestinal:  Soft, Non-tender, + BS  Lower Extremities: No edema  Psychiatry: Patient is calm. No agitation. Mood & affect appropriate  Skin: No rashes/ ecchymoses/cyanosis/ulcers visualized on the face, hands or feet.      CURRENT MEDICATIONS:  furosemide    Tablet 40 milliGRAM(s) Oral daily  metoprolol succinate  milliGRAM(s) Oral daily  sacubitril 49 mG/valsartan 51 mG 1 Tablet(s) Oral two times a day    sertraline  atorvastatin  apixaban  potassium chloride    Tablet ER      DIAGNOSTIC TESTING:  [ ] Echocardiogram:   < from: TTE Echo Complete w/o Contrast w/ Doppler (03.12.21 @ 21:12) >  Summary:   1. Moderately enlarged left atrium.   2. There is mild concentric left ventricular hypertrophy.   3. Normal wall motion. Left ventricular ejection fraction, by visual estimation, is 55to 60%. Grade II diastolic dysfunction.   4. Mildly enlarged right atrium.   5. Normal right ventricular size and function.   6. No significant valvular abnormality.   7. There is no evidence of pericardial effusion.    MD Joe, RPVI Electronically signed on 3/13/2021 at 12:49:31 PM    < end of copied text >    [ ]  Catheterization:  [ ] Stress Test:    OTHER: 	      LABS:	 	  CARDIAC MARKERS ( 12 Mar 2021 17:45 )  x     / <0.01 ng/mL / x     / x     / x      p-BNP 12 Mar 2021 17:45: x    , CARDIAC MARKERS ( 12 Mar 2021 12:40 )  x     / <0.01 ng/mL / x     / x     / x      p-BNP 12 Mar 2021 12:40: 753 pg/mL, CARDIAC MARKERS ( 11 Mar 2021 23:52 )  x     / <0.01 ng/mL / x     / x     / x      p-BNP 11 Mar 2021 23:52: x    , CARDIAC MARKERS ( 11 Mar 2021 20:39 )  x     / <0.01 ng/mL / x     / x     / x      p-BNP 11 Mar 2021 20:39: 956 pg/mL                          14.7   5.88  )-----------( 205      ( 12 Mar 2021 12:40 )             44.5     03-12    143  |  102  |  18.0  ----------------------------<  111<H>  4.2   |  22.0  |  0.98    Ca    8.8      12 Mar 2021 12:40  Mg     1.9     03-11    TPro  6.9  /  Alb  3.9  /  TBili  0.6  /  DBili  x   /  AST  44<H>  /  ALT  22  /  AlkPhos  56  03-12    proBNP: Serum Pro-Brain Natriuretic Peptide: 753 pg/mL (03-12 @ 12:40)  Serum Pro-Brain Natriuretic Peptide: 956 pg/mL (03-11 @ 20:39)    TSH: Thyroid Stimulating Hormone, Serum: 3.23 uIU/mL  Thyroid Stimulating Hormone, Serum: 2.05 uIU/mL    TELEMETRY: Reviewed

## 2021-03-13 NOTE — ED ADULT NURSE REASSESSMENT NOTE - NS ED NURSE REASSESS COMMENT FT1
Patient very anxious. JAJA Quiroga at the bedside. Awaiting orders for ativan po. VSS. Patient provided DC instructions by JAJA Quiroga.
Patient has no complaints, resting comfortably and calm on stretcher, slept well throughout the night.  Safety maintained, will continue to monitor.
Assumed care of the patient @1620. Pt A&Ox4. VSS afebrile. Pt on cardiac monitor sinus rhythm. Patient in understanding of plan of care. Patient with no further questions for the RN. Resting in comfort. Call bell within reach and encouraged to use when assistance needed. Will continue to monitor.
Received patient from john RN at 1900.  Pt AxO4, in no acute distress, denies any discomfort at this time.  Pt denies chest pain/SOB at this time.  Tele monitor in place-SR 80s.  Left hand #20g flushing without difficulty.  Safety maintained, bed in low position, call bell within reach, side rails up x2.  Plan of care explained.  Pt verbalized understanding.  Will continue to monitor.
Assumed care of the patient at 0730. Verbal report received from Shayy GILBERT Obs. Patient A&Ox4. Appears anxious, klonopin po given as per orders. NSR on CM. PIV patent. CIWA 2. Patient denies any palpitations/CP or SOB. Patient ambulatory with steady gait. Patient pending TTE read, Cardiology and SW consult this am. Patient in understanding of plan of care. Patient with no further questions for the RN. Resting in comfort. Call bell within reach and encouraged to use when assistance needed. Will continue to monitor.

## 2021-03-13 NOTE — ED CDU PROVIDER DISPOSITION NOTE - NSFOLLOWUPCLINICS_GEN_ALL_ED_FT
[facility]  Cardiology  [address] [place] [address]  Phone: [phone]  Fax:   Follow Up Time:

## 2021-03-13 NOTE — ED ADULT NURSE REASSESSMENT NOTE - ANCILLARY STATUS
EKG at bedside/cardiovascular tests pending
cardiovascular tests pending
TTE read/radiology results pending

## 2021-03-13 NOTE — ED CDU PROVIDER DISPOSITION NOTE - CLINICAL COURSE
pt c/o chest pain - trop neg x 3, TTE normal - cleared by SS cards with medication adjustments - SW addressed patient's living situation - arranged for shelter

## 2021-04-05 ENCOUNTER — EMERGENCY (EMERGENCY)
Facility: HOSPITAL | Age: 61
LOS: 1 days | Discharge: HOME | End: 2021-04-05
Attending: EMERGENCY MEDICINE
Payer: MEDICARE

## 2021-04-05 VITALS
HEIGHT: 67 IN | OXYGEN SATURATION: 97 % | WEIGHT: 184.97 LBS | SYSTOLIC BLOOD PRESSURE: 124 MMHG | DIASTOLIC BLOOD PRESSURE: 87 MMHG | TEMPERATURE: 98 F | HEART RATE: 72 BPM | RESPIRATION RATE: 18 BRPM

## 2021-04-05 VITALS
RESPIRATION RATE: 16 BRPM | HEART RATE: 65 BPM | SYSTOLIC BLOOD PRESSURE: 170 MMHG | OXYGEN SATURATION: 98 % | DIASTOLIC BLOOD PRESSURE: 94 MMHG

## 2021-04-05 PROBLEM — I10 ESSENTIAL (PRIMARY) HYPERTENSION: Chronic | Status: ACTIVE | Noted: 2021-03-11

## 2021-04-05 PROBLEM — I48.91 UNSPECIFIED ATRIAL FIBRILLATION: Chronic | Status: ACTIVE | Noted: 2021-03-11

## 2021-04-05 PROBLEM — F32.9 MAJOR DEPRESSIVE DISORDER, SINGLE EPISODE, UNSPECIFIED: Chronic | Status: ACTIVE | Noted: 2021-03-12

## 2021-04-05 PROBLEM — I50.9 HEART FAILURE, UNSPECIFIED: Chronic | Status: ACTIVE | Noted: 2021-03-11

## 2021-04-05 LAB
ALBUMIN SERPL ELPH-MCNC: 4 G/DL — SIGNIFICANT CHANGE UP (ref 3.3–5.2)
ALP SERPL-CCNC: 55 U/L — SIGNIFICANT CHANGE UP (ref 40–120)
ALT FLD-CCNC: 22 U/L — SIGNIFICANT CHANGE UP
ANION GAP SERPL CALC-SCNC: 12 MMOL/L — SIGNIFICANT CHANGE UP (ref 5–17)
AST SERPL-CCNC: 28 U/L — SIGNIFICANT CHANGE UP
BASOPHILS # BLD AUTO: 0.07 K/UL — SIGNIFICANT CHANGE UP (ref 0–0.2)
BASOPHILS NFR BLD AUTO: 0.9 % — SIGNIFICANT CHANGE UP (ref 0–2)
BILIRUB SERPL-MCNC: 0.5 MG/DL — SIGNIFICANT CHANGE UP (ref 0.4–2)
BUN SERPL-MCNC: 26 MG/DL — HIGH (ref 8–20)
CALCIUM SERPL-MCNC: 8.4 MG/DL — LOW (ref 8.6–10.2)
CHLORIDE SERPL-SCNC: 101 MMOL/L — SIGNIFICANT CHANGE UP (ref 98–107)
CO2 SERPL-SCNC: 24 MMOL/L — SIGNIFICANT CHANGE UP (ref 22–29)
CREAT SERPL-MCNC: 1.04 MG/DL — SIGNIFICANT CHANGE UP (ref 0.5–1.3)
EOSINOPHIL # BLD AUTO: 0.18 K/UL — SIGNIFICANT CHANGE UP (ref 0–0.5)
EOSINOPHIL NFR BLD AUTO: 2.2 % — SIGNIFICANT CHANGE UP (ref 0–6)
ETHANOL SERPL-MCNC: 142 MG/DL — HIGH (ref 0–9)
GLUCOSE SERPL-MCNC: 96 MG/DL — SIGNIFICANT CHANGE UP (ref 70–99)
HCT VFR BLD CALC: 46.9 % — SIGNIFICANT CHANGE UP (ref 39–50)
HGB BLD-MCNC: 15.1 G/DL — SIGNIFICANT CHANGE UP (ref 13–17)
IMM GRANULOCYTES NFR BLD AUTO: 1.1 % — SIGNIFICANT CHANGE UP (ref 0–1.5)
LYMPHOCYTES # BLD AUTO: 1.48 K/UL — SIGNIFICANT CHANGE UP (ref 1–3.3)
LYMPHOCYTES # BLD AUTO: 18.2 % — SIGNIFICANT CHANGE UP (ref 13–44)
MAGNESIUM SERPL-MCNC: 2 MG/DL — SIGNIFICANT CHANGE UP (ref 1.6–2.6)
MCHC RBC-ENTMCNC: 29.1 PG — SIGNIFICANT CHANGE UP (ref 27–34)
MCHC RBC-ENTMCNC: 32.2 GM/DL — SIGNIFICANT CHANGE UP (ref 32–36)
MCV RBC AUTO: 90.4 FL — SIGNIFICANT CHANGE UP (ref 80–100)
MONOCYTES # BLD AUTO: 0.6 K/UL — SIGNIFICANT CHANGE UP (ref 0–0.9)
MONOCYTES NFR BLD AUTO: 7.4 % — SIGNIFICANT CHANGE UP (ref 2–14)
NEUTROPHILS # BLD AUTO: 5.7 K/UL — SIGNIFICANT CHANGE UP (ref 1.8–7.4)
NEUTROPHILS NFR BLD AUTO: 70.2 % — SIGNIFICANT CHANGE UP (ref 43–77)
NT-PROBNP SERPL-SCNC: 192 PG/ML — SIGNIFICANT CHANGE UP (ref 0–300)
PLATELET # BLD AUTO: 231 K/UL — SIGNIFICANT CHANGE UP (ref 150–400)
POTASSIUM SERPL-MCNC: 3.8 MMOL/L — SIGNIFICANT CHANGE UP (ref 3.5–5.3)
POTASSIUM SERPL-SCNC: 3.8 MMOL/L — SIGNIFICANT CHANGE UP (ref 3.5–5.3)
PROT SERPL-MCNC: 6.8 G/DL — SIGNIFICANT CHANGE UP (ref 6.6–8.7)
RBC # BLD: 5.19 M/UL — SIGNIFICANT CHANGE UP (ref 4.2–5.8)
RBC # FLD: 13.2 % — SIGNIFICANT CHANGE UP (ref 10.3–14.5)
SODIUM SERPL-SCNC: 137 MMOL/L — SIGNIFICANT CHANGE UP (ref 135–145)
TROPONIN T SERPL-MCNC: <0.01 NG/ML — SIGNIFICANT CHANGE UP (ref 0–0.06)
TROPONIN T SERPL-MCNC: <0.01 NG/ML — SIGNIFICANT CHANGE UP (ref 0–0.06)
TSH SERPL-MCNC: 1.03 UIU/ML — SIGNIFICANT CHANGE UP (ref 0.27–4.2)
WBC # BLD: 8.12 K/UL — SIGNIFICANT CHANGE UP (ref 3.8–10.5)
WBC # FLD AUTO: 8.12 K/UL — SIGNIFICANT CHANGE UP (ref 3.8–10.5)

## 2021-04-05 PROCEDURE — 93005 ELECTROCARDIOGRAM TRACING: CPT

## 2021-04-05 PROCEDURE — 83735 ASSAY OF MAGNESIUM: CPT

## 2021-04-05 PROCEDURE — 84443 ASSAY THYROID STIM HORMONE: CPT

## 2021-04-05 PROCEDURE — 99285 EMERGENCY DEPT VISIT HI MDM: CPT | Mod: GC

## 2021-04-05 PROCEDURE — 83880 ASSAY OF NATRIURETIC PEPTIDE: CPT

## 2021-04-05 PROCEDURE — 71046 X-RAY EXAM CHEST 2 VIEWS: CPT | Mod: 26

## 2021-04-05 PROCEDURE — 36415 COLL VENOUS BLD VENIPUNCTURE: CPT

## 2021-04-05 PROCEDURE — 85025 COMPLETE CBC W/AUTO DIFF WBC: CPT

## 2021-04-05 PROCEDURE — 93010 ELECTROCARDIOGRAM REPORT: CPT

## 2021-04-05 PROCEDURE — 84484 ASSAY OF TROPONIN QUANT: CPT

## 2021-04-05 PROCEDURE — 99284 EMERGENCY DEPT VISIT MOD MDM: CPT | Mod: 25

## 2021-04-05 PROCEDURE — 80053 COMPREHEN METABOLIC PANEL: CPT

## 2021-04-05 PROCEDURE — 80307 DRUG TEST PRSMV CHEM ANLYZR: CPT

## 2021-04-05 PROCEDURE — 71046 X-RAY EXAM CHEST 2 VIEWS: CPT

## 2021-04-05 NOTE — ED PROVIDER NOTE - PATIENT PORTAL LINK FT
You can access the FollowMyHealth Patient Portal offered by [facility] by registering at the following website: [email or web address]. By joining [email or web address]’s FollowMyHealth portal, you will also be able to view your health information using other applications (apps) compatible with our system.

## 2021-04-05 NOTE — ED PROVIDER NOTE - NS ED ROS FT
Constitutional: no fever, no chills  Head: NC, AT   Eyes: no redness   ENMT: no nasal congestion/drainage, no sore throat   CV: +"heart beating hard," no chest pain, no edema, no TAVAREZ   Resp: no cough, no dyspnea  GI: no abdominal pain, no nausea, +vomiting, +diarrhea  (Chronic)   : no dysuria, no hematuria   Skin: no lesions, scabbed abrasions  Neuro: no LOC, no headache, no sensory deficits, no weakness, no tremor

## 2021-04-05 NOTE — ED PROVIDER NOTE - PMH
Atrial fibrillation    CHF (congestive heart failure)    Depression, unspecified depression type    HTN (hypertension)    Hyperlipidemia    Pulmonary hypertension

## 2021-04-05 NOTE — ED PROVIDER NOTE - NSFOLLOWUPCLINICS_GEN_ALL_ED_FT
[facility]  Cardiology  [address] [place] [address]  Phone: [phone]  Fax:     [facility]  Cardiology  [address] [facility] [address]  [place] [address]  Phone: [phone]  Fax:

## 2021-04-05 NOTE — ED PROVIDER NOTE - ATTENDING CONTRIBUTION TO CARE
seen with resident: disheveled, pleasant adult male with vague chest discomfort, "heart was beating hard," but denies CP, SOB, or palpitations; states is compliant with meds, but is a daily drinker; feels better now, would like to discuss detox; on exam, non toxic, awake and alert, no JVD; clear lung b/l; warm and well perfused, regular rhythm, no murmurs; abd soft nt nd; Labs and ecg reviewed; reassessed, feels better; appreciate SBIRT assistance. OK for d/c with plan for otpt detox and cards f/u

## 2021-04-05 NOTE — SBIRT NOTE ADULT - NSSBIRTALCACTION/INTER_GEN_A_CORE
Passive referral to treatment
Anus position normal and patency confirmed, rectal-cutaneous fistula absent, normal anal wink.

## 2021-04-05 NOTE — ED PROVIDER NOTE - NSFOLLOWUPINSTRUCTIONS_ED_ALL_ED_FT
Your bloodwork and Chest Xray were normal. It is safe for you to go home but please   1. Return to the ER if you have any new, worsening, or concerning symptoms.  2. Follow up with your cardiologist!     Chest Pain    Chest pain can be caused by many different conditions which may or may not be dangerous. Causes include heartburn, lung infections, heart attack, blood clot in lungs, skin infections, strain or damage to muscle, cartilage, or bones, etc. In addition to a history and physical examination, an electrocardiogram (ECG) or other lab tests may have been performed to determine the cause of your chest pain. Follow up with your primary care provider or with a cardiologist as instructed.     SEEK IMMEDIATE MEDICAL CARE IF YOU HAVE ANY OF THE FOLLOWING SYMPTOMS: worsening chest pain, coughing up blood, unexplained back/neck/jaw pain, severe abdominal pain, dizziness or lightheadedness, fainting, shortness of breath, sweaty or clammy skin, vomiting, or racing heart beat. These symptoms may represent a serious problem that is an emergency. Do not wait to see if the symptoms will go away. Get medical help right away. Call 911 and do not drive yourself to the hospital.

## 2021-04-05 NOTE — ED ADULT TRIAGE NOTE - SPO2 (%)
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     Pain before session:  FLACC (face, legs, activity, cry, consolability):   Face 0 No particular expression or smile   Legs 0 Normal position or relaxed   Activity 0 Lying quietly, normal position, moves easily   Cry 0 No cry, quiet   Consolability 0 Content, relaxed   Score 0     OBJECTIVE   Infant drowsy but alerted with stimulation. Vitals WNL. Mother present.     Status at onset of session:   Physiologic: Stable autonomic behaviors including  stable heart rate, regular respirations, pink/stable color and appropriate oxygen saturations.  Motor: Stable motor behaviors including  sucking and hand(s) to face.  State: Infant was in a quiet alert state. Stable state behaviors including appropriate responses.    Current Feeding: PO, NG, 75 mls q 3 hours, expressed breastmilk, 1/4 tsp Gelmix per 60mL milk   Respiratory Status: room air    Treatment/Intervention    Oral Motor/ Peripheral Examination  Structural observations: all structures observed and intact  Oral musculature: adequate for feeding  Reflexes: age appropriate  Secretion management: within functional limits  Phonation:  N/A  Non-nutritive suck: able to root and initiate a non-nutirtive suck with adequate skills for gestational age  Stress cues observed during oral motor:   - Motor: none noted   - Physiological: none noted  Oral motor treatment: Able to initiate and maintain a non-nutritive suck with RR within normal limits.    Oral Feeding:   - Infant fed by parent  - Postioning during feeding: Sidelying, Elevated   - Infant consumed 15 mls  and thickened with packed 1/4 tsp gelmix per 60 mLs breast milk via Dr. Valdez's Preemie Nipple.     Oral Phase:   - Initiation: able to initiate a suck without stimulation required   - Skills: arrhythmical, inconsistent jaw excursions, stress signals nasal flaring, extraneous movements, changes in vitals O2 sats decreased to mid 80s, RR increased to 70s, infant was consistently paced every 3-5 sucks with tipping and removing nipple from mouth    - Pattern: uncoordinated suck/swallow/breath pattern, disorganized   Pharyngeal Phase: Uncoordinated suck/swallow/breathe coordination, Change in vitals RR increased to mid 70s with the preemie nipple, dip in oxygen saturations to mid 80s despite externally pacing every 3-5 sucks, Increased work of breathing    Stress Cues noted during oral feeding:  Autonomic: tachypnea and decreased oxygen desaturations  State: diffuse movement and abrupt transitions between states  Motor: nasal flaring and uncoordinated oral pattern    Therapy Techniques: Infant positioned in sidelying and presented with thickened liquids (packed 1/4 tsp gelmix per 60 mLs breast milk) via Dr. Valdez bottle with preemie nipple. Infant actively rooted and initiated suck. Infant produced an multiple sucks per swallow with an uncoordinated suck/swallow/breathe pattern with RR increasing to mid 70s. Improved with external pacing every 3-5 sucks. Despite pacing, breath holding continued to be noted followed by audible inhalation and O2 sats decreasing to mid 80s. Infant shut down and did  not realert. Feeding stopped due to poor tolerance.     Family Centered Support/Education: Mom present to share impressions and continue teaching.     ASSESSMENT:   Infant noted to have an uncoordinated suck/swallow/breathe pattern with an arrhythmical suck resulting in occasional catch up breathing to mid 70s and decreased O2 sats to mid 80s despite strict external pacing every 3-5 sucks. Recommend ng feedings only at this time. Discussed with mother, bedside nurse, and NP following session.     Impressions & Recommendations:  Aspiration Risk mild-moderate with nectar thick liquids; severe with thin liquids  Factors include: co-morbitides, HIE   Tips for Caregivers  Positioning Recommendations: Other (comment) (hold po feedings at this time)  Nipple Recommendations: Other (comment) (hold po feedings at this time)  Pacing Recommendations: Other (hold po feedings at this time)  State Regulation:  (hold po feedings at this time)  Stress Signals: desat with po feeds, breath holding, audible inhalations  Recommendation Comments: provide opportunities for non-nutritive sucking if showing readiness cues  Patient will benefit from speech therapy. Habilitative potential is good based on assessment above    Pain after session:  FLACC (face, legs, activity, cry, consolability):   Face 0 No particular expression or smile   Legs 0 Normal position or relaxed   Activity 0 Lying quietly, normal position, moves easily   Cry 0 No cry, quiet   Consolability 0 Content, relaxed   Score 0       PLAN:   Suggestions for next session as indicated: continue with plan of care    Goals:  Goals  Discharge Goal #1: Infant will demonstrate a safe, organized oral feeding pattern to support nutritional needs  Goal Progression #1: Outcome no met, plan adjusted  Discharge Goal #2 : Infant will tolerate tastes of breast milk/formula on pacifier with minimal stress cues    Frequency: Frequency: 2-3x/week    Documentation completed on following  flowsheet: Infant flowsheet   97

## 2021-04-05 NOTE — ED PROVIDER NOTE - PSH
[FreeTextEntry1] : Apparent 2 left lower pole nodules FNA past from outside physician -but may have had FNA of only one nodule.  Will await old record and pt wants to consider repeat fna for peace of mind.If fna is to be carried out-swill stay off any blood thinning  products. No significant past surgical history

## 2021-04-05 NOTE — SBIRT NOTE ADULT - NSSBIRTALCPASSREFTXDET_GEN_A_CORE
Provided SBIRT services: Referral to Treatment Performed. Screening results reviewed with patient and patient provided information regarding healthy guidelines and potential negative consequences associated with level of risk. Motivation and readiness to reduce or stop use was discussed and goals and activities to make changes were suggested/offered. Referral for complete assessment and level of care determination at a certified treatment facility was completed by giving the patient information for treatment facilities that met their needs and encouraging them to call for an appointment. A call was not made to a facility because pt is not meeting medical criteria for detox at this time. Passive referral provided for [facility] detox/rehab.   Provided SBIRT services: Referral to Treatment Performed. Screening results reviewed with patient and patient provided information regarding healthy guidelines and potential negative consequences associated with level of risk. Motivation and readiness to reduce or stop use was discussed and goals and activities to make changes were suggested/offered. Referral for complete assessment and level of care determination at a certified treatment facility was completed by giving the patient information for treatment facilities that met their needs and encouraging them to call for an appointment. A call was not made to a facility because pt is not meeting medical criteria for detox at this time. Passive referral provided for [facility] detox/rehab. Outpatient referral, [facility], also given; pt not interested in scheduling an appointment at this time.

## 2021-04-05 NOTE — ED ADULT TRIAGE NOTE - CHIEF COMPLAINT QUOTE
Patient BIBA to ED today from home with c/o left sided chest pain, vomiting, drinking alcohol since last night into this morning.

## 2021-04-05 NOTE — ED PROVIDER NOTE - CLINICAL SUMMARY MEDICAL DECISION MAKING FREE TEXT BOX
60 year old male with history of HTN, HLD, CHF, pulmonary hypertension, active smoker, and alcohol abuse who presents with "my heart is beating hard."  EKG normal and labs unremarkable. CXR clear. Low risk for ACS given trop x 2 negative and HEART score of 3. Patient expressed interest in outpatient rehab for alcohol misuse. He was seen by SBIRT team and was given referral for [facility] rehab/detox. Offered outpatient referral to [facility];patient not interested in scheduling appointment at this time. Patient will be discharged home with return precautions in addition to cardiology follow up.

## 2021-04-05 NOTE — ED PROVIDER NOTE - OBJECTIVE STATEMENT
60 year old male with history of HTN, HLD, CHF, pulmonary hypertension, active smoker, and alcohol abuse who presents with "my heart is beating hard." This morning at 05:00 the patient began to have the sensation "my heart was beating hard." This has been constant but waxing/waning since. He denies chest pain, shortness of breath, leg swelling, and TAVAREZ. States he was unable to follow up with cardiology since his observation stay during last ED visit. In addition the patient states he drinks 1 point of vodka daily for the last 3 weeks and that he would like help with his addicition. His last drink was at 05:00. Had a couple episodes of non-bloody emesis overnight. No history of hospitalization for alcohol withdrawal, DT, AH/VH. Notes that he was scheduled to be in court at 09:30 and requests for ED team to call his  [phone] and his sister Latrice [phone]. Takes his medications (lasix, entresto, toprol, eliquis, etc) daily. Living in [facility] - [facility] [place]. No drug use. 60 year old male with history of HTN, HLD, CHF, pulmonary hypertension, active smoker, and alcohol abuse who presents with "my heart is beating hard." This morning at 05:00 the patient began to have the sensation "my heart was beating hard." This has been constant but waxing/waning since. He denies chest pain, shortness of breath, leg swelling, and TAVAREZ. States he was unable to follow up with cardiology since his observation stay during last ED visit. In addition the patient states he drinks 1 point of vodka daily for the last 3 weeks and that he would like help with his alcohol misuse. His last drink was at 05:00. Had a couple episodes of non-bloody emesis overnight, "it was really nothing." No history of hospitalization for alcohol withdrawal, DT, AH/VH. Notes that he was scheduled to be in court at 09:30 and requests for ED team to call his  [phone] and his sister Latrice [phone]. Takes his medications (lasix, entresto, toprol, eliquis, etc) daily. Living in [facility] - [facility] [place]. No drug use.

## 2021-04-05 NOTE — ED ADULT NURSE NOTE - NSIMPLEMENTINTERV_GEN_ALL_ED
Implemented All Fall Risk Interventions:  [place] to call system. Call bell, personal items and telephone within reach. Instruct patient to call for assistance. Room bathroom lighting operational. Non-slip footwear when patient is off stretcher. Physically safe environment: no spills, clutter or unnecessary equipment. Stretcher in lowest position, wheels locked, appropriate side rails in place. Provide visual cue, wrist band, yellow gown, etc. Monitor gait and stability. Monitor for mental status changes and reorient to person, place, and time. Review medications for side effects contributing to fall risk. Reinforce activity limits and safety measures with patient and family.

## 2021-04-05 NOTE — ED ADULT NURSE NOTE - OBJECTIVE STATEMENT
60 y/M BIBA, alert and oriented x4, Hx of afib, MVP, CHF, anxiety, ETOH abuse, presents with "pounding heart" which has since resolved, he has had similar episodes in the past where he has needed dosage increases to his BP meds. Pt states his last drink was 4am this morning. States he took all his prescribed meds accept Clonidine and furosemide. Pt is in NAD at the time, he is normal sinus on the monitor, BP elevated 179/94, breathing is even unlabored, breath sound equal and clear b/l. Pt has a distended abdomen, non-tender, NO lower extremity swelling noted. Denies, GI/ symptoms. Pt has a hx of recent falls, has a scab in his left knee. Is seeking possible rehab for his ETOH abuse.

## 2021-04-05 NOTE — ED ADULT NURSE NOTE - NS_SISCREENINGSR_GEN_ALL_ED
[facility]  Family Practice Services    [address]    [facility] [address]    Phone:  [phone]    Fax:  [phone]       Thank You for choosing us for your health care visit. We are glad to serve you and happy to provide you with this summary of your visit. Please help us to ensure we have accurate records. If you find anything that needs to be changed, please let our staff know as soon as possible.          Your Demographic Information     Patient Name Sex Armaan Belle Male 2000       Ethnic Group Patient Race    Not of  or  Origin White      Your Visit Details     Date & Time Provider Department    2017 11:30 AM Lenny Spain MD [facility]  Family Practice Services      We Ordered or Performed the Following     SERVICE TO BEHAVIORAL HEALTH       Conditions Discussed Today or Order-Related Diagnoses        Comments    Anxiety and depression    -  Primary     Irritable bowel syndrome with both constipation and diarrhea           Your Vitals Were     BP Pulse Temp Resp Height Weight    120/62 (43 %/ 23 %)* 80 98.1 °F (36.7 °C) (Oral) 16 5' 11.5\" (1.816 m) (79 %, Z= 0.81)† 177 lb 12.8 oz (80.6 kg) (86 %, Z= 1.10)†    BMI Smoking Status                24.45 kg/m2 (80 %, Z= 0.84)† Never Smoker        *BP percentiles are based on NHBPEP's 4th Report    †Growth percentiles are based on CDC 2-20 Years data.      Medications Prescribed or Re-Ordered Today     None      Your Current Medications Are        Disp Refills Start End    escitalopram (LEXAPRO) 10 MG tablet 30 tablet 3 2016     Sig - Route: Take 1 tablet by mouth daily. - Oral    Class: Eprescribe    MELATONIN PO        Sig - Route: Take by mouth as needed.  - Oral    Class: Historical Med    fluticasone (FLONASE) 50 MCG/ACT nasal spray 16 g 0 2015     Sig - Route: Spray 2 sprays in each nostril daily. - Nasal    Class: Eprescribe      Allergies     Pollen Other (See Comments)       Immunizations History as of 1/9/2017     Name Date    DTaP 7/5/2004, 10/9/2000, 1999, 1999, 1999    HEPATITIS A CHILD 12/20/2016    HIB 10/9/2000, 1999, 1999, 1999    Hepatitis B Child 1999, 1999, 1999, 1999    INFLUENZA QUADRIVALENT 11/8/2016    Influenza Quadrivalent Preservative Free 12/14/2015    MMR 6/23/2011, 7/5/2004, 6/15/2000    Meningococcal Conjugate MCV4P (Menactra) 12/14/2015, 6/23/2011    Pneumococcal Conjugate 13 Valent 10/9/2000, 6/15/2000    Polio, INACTIVATED 7/5/2004, 10/9/2000, 1999, 1999    Tdap 8/19/2010    Varicella 9/16/2011, 6/23/2011, 6/15/2000      Problem List as of 1/9/2017     Flow murmur    Depression            Patient Instructions     None       Negative

## 2021-04-05 NOTE — ED PROVIDER NOTE - PROGRESS NOTE DETAILS
Derek: Labs in process Derek: I rechecked the patient and let him know to call his  once he is back from CXR Derek: I rechecked the patient and updated him on results so far. Offered blanket, which was declined

## 2021-04-05 NOTE — ED PROVIDER NOTE - PHYSICAL EXAMINATION
General: disheveled appearing 60 year old male    Head: NC, AT, poor dentition   EENT: EOMI, no scleral icterus  Cardiac: RRR, no apparent murmurs, no lower extremity edema  Respiratory: CTABL, no respiratory distress   Abdomen: soft, ND, NT, nonperitonitic  MSK/Vascular: full ROM, soft compartments, warm extremities  Neuro: AAOx3, sensation to light touch intact, no tremor   Psych: +anxiety, cooperative, no SI, HI, AH/VH

## 2021-04-13 ENCOUNTER — EMERGENCY (EMERGENCY)
Facility: HOSPITAL | Age: 61
LOS: 1 days | Discharge: HOME | End: 2021-04-13
Attending: EMERGENCY MEDICINE
Payer: MEDICARE

## 2021-04-13 VITALS
WEIGHT: 184.09 LBS | RESPIRATION RATE: 18 BRPM | SYSTOLIC BLOOD PRESSURE: 138 MMHG | DIASTOLIC BLOOD PRESSURE: 83 MMHG | OXYGEN SATURATION: 98 % | HEART RATE: 103 BPM | TEMPERATURE: 99 F | HEIGHT: 67 IN

## 2021-04-13 VITALS
RESPIRATION RATE: 16 BRPM | OXYGEN SATURATION: 97 % | DIASTOLIC BLOOD PRESSURE: 88 MMHG | HEART RATE: 70 BPM | SYSTOLIC BLOOD PRESSURE: 150 MMHG | TEMPERATURE: 98 F

## 2021-04-13 LAB
ALBUMIN SERPL ELPH-MCNC: 3.8 G/DL — SIGNIFICANT CHANGE UP (ref 3.3–5.2)
ALP SERPL-CCNC: 63 U/L — SIGNIFICANT CHANGE UP (ref 40–120)
ALT FLD-CCNC: 25 U/L — SIGNIFICANT CHANGE UP
ANION GAP SERPL CALC-SCNC: 13 MMOL/L — SIGNIFICANT CHANGE UP (ref 5–17)
AST SERPL-CCNC: 28 U/L — SIGNIFICANT CHANGE UP
BASOPHILS # BLD AUTO: 0.04 K/UL — SIGNIFICANT CHANGE UP (ref 0–0.2)
BASOPHILS NFR BLD AUTO: 0.6 % — SIGNIFICANT CHANGE UP (ref 0–2)
BILIRUB SERPL-MCNC: 0.3 MG/DL — LOW (ref 0.4–2)
BUN SERPL-MCNC: 23 MG/DL — HIGH (ref 8–20)
CALCIUM SERPL-MCNC: 8.6 MG/DL — SIGNIFICANT CHANGE UP (ref 8.6–10.2)
CHLORIDE SERPL-SCNC: 105 MMOL/L — SIGNIFICANT CHANGE UP (ref 98–107)
CO2 SERPL-SCNC: 24 MMOL/L — SIGNIFICANT CHANGE UP (ref 22–29)
CREAT SERPL-MCNC: 1.33 MG/DL — HIGH (ref 0.5–1.3)
EOSINOPHIL # BLD AUTO: 0.16 K/UL — SIGNIFICANT CHANGE UP (ref 0–0.5)
EOSINOPHIL NFR BLD AUTO: 2.5 % — SIGNIFICANT CHANGE UP (ref 0–6)
GLUCOSE SERPL-MCNC: 92 MG/DL — SIGNIFICANT CHANGE UP (ref 70–99)
HCT VFR BLD CALC: 42.9 % — SIGNIFICANT CHANGE UP (ref 39–50)
HGB BLD-MCNC: 14.2 G/DL — SIGNIFICANT CHANGE UP (ref 13–17)
IMM GRANULOCYTES NFR BLD AUTO: 0.6 % — SIGNIFICANT CHANGE UP (ref 0–1.5)
LYMPHOCYTES # BLD AUTO: 1.44 K/UL — SIGNIFICANT CHANGE UP (ref 1–3.3)
LYMPHOCYTES # BLD AUTO: 22.5 % — SIGNIFICANT CHANGE UP (ref 13–44)
MCHC RBC-ENTMCNC: 29.8 PG — SIGNIFICANT CHANGE UP (ref 27–34)
MCHC RBC-ENTMCNC: 33.1 GM/DL — SIGNIFICANT CHANGE UP (ref 32–36)
MCV RBC AUTO: 89.9 FL — SIGNIFICANT CHANGE UP (ref 80–100)
MONOCYTES # BLD AUTO: 0.53 K/UL — SIGNIFICANT CHANGE UP (ref 0–0.9)
MONOCYTES NFR BLD AUTO: 8.3 % — SIGNIFICANT CHANGE UP (ref 2–14)
NEUTROPHILS # BLD AUTO: 4.19 K/UL — SIGNIFICANT CHANGE UP (ref 1.8–7.4)
NEUTROPHILS NFR BLD AUTO: 65.5 % — SIGNIFICANT CHANGE UP (ref 43–77)
PLATELET # BLD AUTO: SIGNIFICANT CHANGE UP K/UL (ref 150–400)
POTASSIUM SERPL-MCNC: 3.8 MMOL/L — SIGNIFICANT CHANGE UP (ref 3.5–5.3)
POTASSIUM SERPL-SCNC: 3.8 MMOL/L — SIGNIFICANT CHANGE UP (ref 3.5–5.3)
PROT SERPL-MCNC: 6.3 G/DL — LOW (ref 6.6–8.7)
RBC # BLD: 4.77 M/UL — SIGNIFICANT CHANGE UP (ref 4.2–5.8)
RBC # FLD: 13.7 % — SIGNIFICANT CHANGE UP (ref 10.3–14.5)
SODIUM SERPL-SCNC: 142 MMOL/L — SIGNIFICANT CHANGE UP (ref 135–145)
TSH SERPL-MCNC: 1.97 UIU/ML — SIGNIFICANT CHANGE UP (ref 0.27–4.2)
WBC # BLD: 6.4 K/UL — SIGNIFICANT CHANGE UP (ref 3.8–10.5)
WBC # FLD AUTO: 6.4 K/UL — SIGNIFICANT CHANGE UP (ref 3.8–10.5)

## 2021-04-13 PROCEDURE — 99283 EMERGENCY DEPT VISIT LOW MDM: CPT

## 2021-04-13 PROCEDURE — 36415 COLL VENOUS BLD VENIPUNCTURE: CPT

## 2021-04-13 PROCEDURE — 93005 ELECTROCARDIOGRAM TRACING: CPT

## 2021-04-13 PROCEDURE — 84443 ASSAY THYROID STIM HORMONE: CPT

## 2021-04-13 PROCEDURE — 80053 COMPREHEN METABOLIC PANEL: CPT

## 2021-04-13 PROCEDURE — 85025 COMPLETE CBC W/AUTO DIFF WBC: CPT

## 2021-04-13 PROCEDURE — 99284 EMERGENCY DEPT VISIT MOD MDM: CPT

## 2021-04-13 PROCEDURE — 93010 ELECTROCARDIOGRAM REPORT: CPT

## 2021-04-13 RX ORDER — SODIUM CHLORIDE 9 MG/ML
1000 INJECTION INTRAMUSCULAR; INTRAVENOUS; SUBCUTANEOUS ONCE
Refills: 0 | Status: COMPLETED | OUTPATIENT
Start: 2021-04-13 | End: 2021-04-13

## 2021-04-13 RX ADMIN — SODIUM CHLORIDE 1000 MILLILITER(S): 9 INJECTION INTRAMUSCULAR; INTRAVENOUS; SUBCUTANEOUS at 20:19

## 2021-04-13 NOTE — ED PROVIDER NOTE - NSFOLLOWUPINSTRUCTIONS_ED_ALL_ED_FT
- Follow up with your doctor within 2-3 days.   - Follow up with [facility], see information above.   - Bring results with you to the appointment.   - Return to the ED for any new or worsening symptoms.    Palpitations    A palpitation is the feeling that your heartbeat is irregular or is faster than normal. It may feel like your heart is fluttering or skipping a beat. They may be caused by many things, including smoking, caffeine, alcohol, stress, and certain medicines. Although most causes of palpitations are not serious, palpitations can be a sign of a serious medical problem. Avoid caffeine, alcohol, and tobacco products at home. Try to reduce stress and anxiety and make sure to get plenty of rest.     SEEK IMMEDIATE MEDICAL CARE IF YOU HAVE ANY OF THE FOLLOWING SYMPTOMS: chest pain, shortness of breath, severe headache, dizziness/lightheadedness, or fainting.

## 2021-04-13 NOTE — ED ADULT NURSE NOTE - OBJECTIVE STATEMENT
Patient A&Ox4 complaining of headache x2.5 hours. Stated came to ED because he was experiencing palpitations & abdominal pain. Respirations even & unlabored. Cardiac monitor in place.  in progress.

## 2021-04-13 NOTE — ED PROVIDER NOTE - CLINICAL SUMMARY MEDICAL DECISION MAKING FREE TEXT BOX
Will obtain labs, EKG, and reassess. Offered inpatient detox. Will obtain labs, EKG, and reassess. Offered inpatient detox but patient is arranging to go to [facility] outpatient.

## 2021-04-13 NOTE — ED ADULT TRIAGE NOTE - CHIEF COMPLAINT QUOTE
pt c/o dizziness, stomach ache and palpitations while walking that have subsided. pt reports drinking 6 beers today.
patient c/o abdominal pain for the past 5 days and b/l leg swelling.

## 2021-04-13 NOTE — ED ADULT TRIAGE NOTE - NS ED NURSE BANDS TYPE
Reason For Visit  ZACH ALMEIDA is here today for a nurse visit for Flu shot.   Patient accompanied by n/a.      Current Meds   1. Accu-Chek FastClix Lancets; USE TO TEST BLOOD GLUCOSE ONCE WEEKLY OR AS   DIRECTED BY PHYSICIAN;   Therapy: 95Nnz0191 to (Evaluate:66Rao6694)  Requested for: 58Ixg5402; Last   Rx:[id number] Ordered   2. Accu-Chek SmartView In Vitro Strip; USE TO TEST BLOOD GLUCOSE ONCE WEEKLY   OR AS DIRECTED BY PHYSICIAN;   Therapy: 89Afb3716 to (Evaluate:32Slb6581)  Requested for: 43Ami0316; Last   Rx:[id number] Ordered   3. Atorvastatin Calcium 40 MG Oral Tablet; TAKE 1 TABLET BY MOUTH EVERY NIGHT AT   BEDTIME;   Therapy: 60Hta3923 to (Evaluate:24Jan2019)  Requested for: 28Jun2018; Last   Rx:[id number] Ordered   4. Bisacodyl EC 5 MG Oral Tablet Delayed Release; TAKE 2 TABLETS AFTER   COMPLETION OF 1ST HALF OF BOWEL PREP;   Therapy: 15Mar2018 to (Last Rx:15Mar2018)  Requested for: 15Mar2018 Ordered   5. Chlorthalidone 25 MG Oral Tablet; TAKE 1 TABLET BY MOUTH EVERY DAY;   Therapy: 09Ood9786 to (Evaluate:29Oct2018)  Requested for: 22Bfy2074; Last   Rx:[id number] Ordered   6. Flovent  MCG/ACT Inhalation Aerosol; INHALE 2 PUFFS TWICE DAILY until   cough resolves;   Therapy: 72Hln0021 to (Evaluate:24Ctw5574)  Requested for: 29Bbo6648; Last   Rx:[id number] Ordered   7. Klor-Con M20 20 MEQ Oral Tablet Extended Release; TAKE 1 TABLET BY MOUTH DAILY;   Therapy: 25Nov2009 to (Evaluate:22Nov2018)  Requested for: 62Zas1317; Last   Rx:[id number] Ordered   8. Levothyroxine Sodium 88 MCG Oral Tablet; take one tablet by mouth every day;   Therapy: 51Xod3413 to (Evaluate:28Lgq0498)  Requested for: 18Hrd6441; Last   Rx:[id number] Ordered   9. Lisinopril 10 MG Oral Tablet; TAKE 1 TABLET ORALLY TWICE DAILY;   Therapy: 23Nov2009 to (Evaluate:29Oct2018)  Requested for: 29Ddy1623; Last   Rx:[id number] Ordered   10. MetFORMIN HCl - 500 MG Oral Tablet; TAKE 1 TABLET BY MOUTH TWICE DAILY;    Therapy: 17Jun2015 to 
(Evaluate:13Viv7352)  Requested for: 87Rob8998; Last    Rx:[id number] Ordered   11. PEG 3350-KCl-Na Bicarb-NaCl 420 GM Oral Solution Reconstituted; Day before    Procedure drink 1/2 gallon from 5pm to 7pm. Day of procedure drink    2nd 1/2 gallon 5 hours before scheduled procedure;    Therapy: 15Mar2018 to (Last Rx:[id number])  Requested for: 14Vtg6542 Ordered   12. Warfarin Sodium 5 MG Oral Tablet; TAKE AS DIRECTED;    Therapy: 09Nov2009 to (Evaluate:72Eao6158)  Requested for: 97Hta1026; Last    Rx:[id number] Ordered    Allergies  No Known Drug Allergies    Nurse Documentation    Patient denies allergies to eggs, cold symptoms - FluLaval IM left deltoid 0.5 ml and Prevnar IM right deltoid 0.5 ml - patient discharge after 15 minutes with no reactions to immunization.           Assessment   1. Encounter for preventive health examination (Z00.00)    Plan   1. Flulaval Quadrivalent 0.5 ML Intramuscular Suspension Prefilled Syringe   2. Prevnar 13 Intramuscular Suspension    Signatures   Electronically signed by : Grace Mancilla R.N.; Oct 30 2018 10:16AM CST    
Name band;
Name band;

## 2021-04-13 NOTE — ED ADULT NURSE NOTE - NSIMPLEMENTINTERV_GEN_ALL_ED
Implemented All Universal Safety Interventions:  [place] to call system. Call bell, personal items and telephone within reach. Instruct patient to call for assistance. Room bathroom lighting operational. Non-slip footwear when patient is off stretcher. Physically safe environment: no spills, clutter or unnecessary equipment. Stretcher in lowest position, wheels locked, appropriate side rails in place.

## 2021-04-13 NOTE — ED PROVIDER NOTE - PATIENT PORTAL LINK FT
You can access the FollowMyHealth Patient Portal offered by [facility] by registering at the following website: [email or web address]. By joining [employer]’s FollowMyHealth portal, you will also be able to view your health information using other applications (apps) compatible with our system.

## 2021-04-13 NOTE — ED PROVIDER NOTE - PHYSICAL EXAMINATION
Gen: Well appearing in NAD  Head: NC/AT  Neck: trachea midline  Resp:  No distress, CTAB  CV: RRR  : soft, nt  Ext: no deformities  Neuro:  A&O appears non focal  Skin:  Warm and dry as visualized  Psych:  Normal affect and mood Gen: Well appearing in NAD  Head: NC/AT  Neck: trachea midline  Resp:  No distress, CTAB  CV: RRR  : soft, nt, nd  Ext: no deformities  Neuro:  A&O appears non focal  Skin:  Warm and dry as visualized  Psych:  Normal affect and mood

## 2021-04-13 NOTE — ED PROVIDER NOTE - OBJECTIVE STATEMENT
59 y/o M with PMHx of A-fib, CHF, Depression, HTN, Hyperlipidemia and Pulmonary HTN presents to ED c/o strong heart beat that lasted around a minute, ha, and stomach ache while walking back from store onset 2 hours ago. Pt notes the stomach pain is still there and his ha has subsided. Pt states he was here a week ago for same strong heart beat. Pt was also here 1 month ago, supposed to follow up with [facility] but never did. Denies palpitations during this episode. Reports ETOH use 2 hours ago. Denies cp or difficulty breathing. Denies drug use. 61 y/o M with PMHx of A-fib, CHF, Depression, HTN, Hyperlipidemia and Pulmonary HTN presents to ED c/o strong heart beat that lasted around a minute, ha, and stomach ache while walking back from store onset 2 hours ago. Pt notes the stomach pain is still there and his ha has subsided. Pt states he was here a week ago for same strong heart beat feeling. Pt was also here 1 month ago, supposed to follow up with [facility] but never did. Reports ETOH use 2 hours ago. Denies cp or difficulty breathing. Denies drug use. 61 y/o M with PMHx of A-fib, CHF, Depression, HTN, Hyperlipidemia and Pulmonary HTN presents to ED c/o strong heart beat that lasted around a minute, ha, and stomach ache while walking back from store onset 2 hours ago. Pt notes the stomach pain is improved but still there and his ha has subsided. Pt states he was here a week ago for same strong heart beat feeling. Pt was also here 1 month ago, supposed to follow up with [facility] but never did. Reports ETOH use 2 hours ago. Denies cp or difficulty breathing. Denies drug use.

## 2021-04-13 NOTE — ED PROVIDER NOTE - NSFOLLOWUPCLINICS_GEN_ALL_ED_FT
[facility] Cardiology  Cardiology  [address] [facility], [facility] [address]  [place] [address]  Phone: [phone]  Fax:   Follow Up Time: 4-6 Days

## 2021-04-13 NOTE — ED ADULT NURSE NOTE - CHIEF COMPLAINT QUOTE
pt c/o dizziness, stomach ache and palpitations while walking that have subsided. pt reports drinking 6 beers today.

## 2021-04-14 PROBLEM — E78.5 HYPERLIPIDEMIA, UNSPECIFIED: Chronic | Status: ACTIVE | Noted: 2021-04-05

## 2021-04-14 PROBLEM — I27.20 PULMONARY HYPERTENSION, UNSPECIFIED: Chronic | Status: ACTIVE | Noted: 2021-04-05

## 2021-06-08 ENCOUNTER — EMERGENCY (EMERGENCY)
Facility: HOSPITAL | Age: 61
LOS: 1 days | Discharge: LEFT BEFORE DISCHARGE | End: 2021-06-08
Attending: EMERGENCY MEDICINE
Payer: MEDICARE

## 2021-06-08 VITALS
HEIGHT: 67 IN | OXYGEN SATURATION: 98 % | SYSTOLIC BLOOD PRESSURE: 107 MMHG | RESPIRATION RATE: 20 BRPM | WEIGHT: 240.08 LBS | DIASTOLIC BLOOD PRESSURE: 85 MMHG | TEMPERATURE: 98 F | HEART RATE: 135 BPM

## 2021-06-08 VITALS
HEART RATE: 80 BPM | TEMPERATURE: 98 F | SYSTOLIC BLOOD PRESSURE: 123 MMHG | OXYGEN SATURATION: 97 % | RESPIRATION RATE: 18 BRPM | DIASTOLIC BLOOD PRESSURE: 85 MMHG

## 2021-06-08 LAB
ALBUMIN SERPL ELPH-MCNC: 3.7 G/DL — SIGNIFICANT CHANGE UP (ref 3.3–5.2)
ALP SERPL-CCNC: 62 U/L — SIGNIFICANT CHANGE UP (ref 40–120)
ALT FLD-CCNC: 23 U/L — SIGNIFICANT CHANGE UP
ANION GAP SERPL CALC-SCNC: 10 MMOL/L — SIGNIFICANT CHANGE UP (ref 5–17)
AST SERPL-CCNC: 18 U/L — SIGNIFICANT CHANGE UP
BASOPHILS # BLD AUTO: 0.04 K/UL — SIGNIFICANT CHANGE UP (ref 0–0.2)
BASOPHILS NFR BLD AUTO: 0.3 % — SIGNIFICANT CHANGE UP (ref 0–2)
BILIRUB SERPL-MCNC: 0.6 MG/DL — SIGNIFICANT CHANGE UP (ref 0.4–2)
BUN SERPL-MCNC: 21.2 MG/DL — HIGH (ref 8–20)
CALCIUM SERPL-MCNC: 8.8 MG/DL — SIGNIFICANT CHANGE UP (ref 8.6–10.2)
CHLORIDE SERPL-SCNC: 103 MMOL/L — SIGNIFICANT CHANGE UP (ref 98–107)
CO2 SERPL-SCNC: 25 MMOL/L — SIGNIFICANT CHANGE UP (ref 22–29)
CREAT SERPL-MCNC: 1.13 MG/DL — SIGNIFICANT CHANGE UP (ref 0.5–1.3)
EOSINOPHIL # BLD AUTO: 0.04 K/UL — SIGNIFICANT CHANGE UP (ref 0–0.5)
EOSINOPHIL NFR BLD AUTO: 0.3 % — SIGNIFICANT CHANGE UP (ref 0–6)
GLUCOSE SERPL-MCNC: 131 MG/DL — HIGH (ref 70–99)
HCT VFR BLD CALC: 39.2 % — SIGNIFICANT CHANGE UP (ref 39–50)
HGB BLD-MCNC: 13 G/DL — SIGNIFICANT CHANGE UP (ref 13–17)
IMM GRANULOCYTES NFR BLD AUTO: 1.1 % — SIGNIFICANT CHANGE UP (ref 0–1.5)
LYMPHOCYTES # BLD AUTO: 1.1 K/UL — SIGNIFICANT CHANGE UP (ref 1–3.3)
LYMPHOCYTES # BLD AUTO: 7.8 % — LOW (ref 13–44)
MCHC RBC-ENTMCNC: 29.5 PG — SIGNIFICANT CHANGE UP (ref 27–34)
MCHC RBC-ENTMCNC: 33.2 GM/DL — SIGNIFICANT CHANGE UP (ref 32–36)
MCV RBC AUTO: 89.1 FL — SIGNIFICANT CHANGE UP (ref 80–100)
MONOCYTES # BLD AUTO: 1.27 K/UL — HIGH (ref 0–0.9)
MONOCYTES NFR BLD AUTO: 9 % — SIGNIFICANT CHANGE UP (ref 2–14)
NEUTROPHILS # BLD AUTO: 11.46 K/UL — HIGH (ref 1.8–7.4)
NEUTROPHILS NFR BLD AUTO: 81.5 % — HIGH (ref 43–77)
NT-PROBNP SERPL-SCNC: 1128 PG/ML — HIGH (ref 0–300)
PLATELET # BLD AUTO: 234 K/UL — SIGNIFICANT CHANGE UP (ref 150–400)
POTASSIUM SERPL-MCNC: 4 MMOL/L — SIGNIFICANT CHANGE UP (ref 3.5–5.3)
POTASSIUM SERPL-SCNC: 4 MMOL/L — SIGNIFICANT CHANGE UP (ref 3.5–5.3)
PROT SERPL-MCNC: 6.7 G/DL — SIGNIFICANT CHANGE UP (ref 6.6–8.7)
RBC # BLD: 4.4 M/UL — SIGNIFICANT CHANGE UP (ref 4.2–5.8)
RBC # FLD: 13.5 % — SIGNIFICANT CHANGE UP (ref 10.3–14.5)
SARS-COV-2 RNA SPEC QL NAA+PROBE: SIGNIFICANT CHANGE UP
SODIUM SERPL-SCNC: 138 MMOL/L — SIGNIFICANT CHANGE UP (ref 135–145)
TROPONIN T SERPL-MCNC: <0.01 NG/ML — SIGNIFICANT CHANGE UP (ref 0–0.06)
TROPONIN T SERPL-MCNC: <0.01 NG/ML — SIGNIFICANT CHANGE UP (ref 0–0.06)
TSH SERPL-MCNC: 2.08 UIU/ML — SIGNIFICANT CHANGE UP (ref 0.27–4.2)
WBC # BLD: 14.06 K/UL — HIGH (ref 3.8–10.5)
WBC # FLD AUTO: 14.06 K/UL — HIGH (ref 3.8–10.5)

## 2021-06-08 PROCEDURE — 71045 X-RAY EXAM CHEST 1 VIEW: CPT

## 2021-06-08 PROCEDURE — 72125 CT NECK SPINE W/O DYE: CPT

## 2021-06-08 PROCEDURE — 84443 ASSAY THYROID STIM HORMONE: CPT

## 2021-06-08 PROCEDURE — 99284 EMERGENCY DEPT VISIT MOD MDM: CPT | Mod: 25

## 2021-06-08 PROCEDURE — 84484 ASSAY OF TROPONIN QUANT: CPT

## 2021-06-08 PROCEDURE — 70450 CT HEAD/BRAIN W/O DYE: CPT

## 2021-06-08 PROCEDURE — 93005 ELECTROCARDIOGRAM TRACING: CPT

## 2021-06-08 PROCEDURE — U0005: CPT

## 2021-06-08 PROCEDURE — 96374 THER/PROPH/DIAG INJ IV PUSH: CPT

## 2021-06-08 PROCEDURE — 99218: CPT

## 2021-06-08 PROCEDURE — 83880 ASSAY OF NATRIURETIC PEPTIDE: CPT

## 2021-06-08 PROCEDURE — 71045 X-RAY EXAM CHEST 1 VIEW: CPT | Mod: 26

## 2021-06-08 PROCEDURE — 70450 CT HEAD/BRAIN W/O DYE: CPT | Mod: 26,MH

## 2021-06-08 PROCEDURE — 99222 1ST HOSP IP/OBS MODERATE 55: CPT

## 2021-06-08 PROCEDURE — 85025 COMPLETE CBC W/AUTO DIFF WBC: CPT

## 2021-06-08 PROCEDURE — U0003: CPT

## 2021-06-08 PROCEDURE — 36415 COLL VENOUS BLD VENIPUNCTURE: CPT

## 2021-06-08 PROCEDURE — 72125 CT NECK SPINE W/O DYE: CPT | Mod: 26,MH

## 2021-06-08 PROCEDURE — 80053 COMPREHEN METABOLIC PANEL: CPT

## 2021-06-08 PROCEDURE — 93010 ELECTROCARDIOGRAM REPORT: CPT

## 2021-06-08 PROCEDURE — G0378: CPT

## 2021-06-08 RX ORDER — HYDRALAZINE HCL 50 MG
75 TABLET ORAL EVERY 8 HOURS
Refills: 0 | Status: DISCONTINUED | OUTPATIENT
Start: 2021-06-08 | End: 2021-06-12

## 2021-06-08 RX ORDER — METOPROLOL TARTRATE 50 MG
200 TABLET ORAL DAILY
Refills: 0 | Status: DISCONTINUED | OUTPATIENT
Start: 2021-06-08 | End: 2021-06-12

## 2021-06-08 RX ORDER — FUROSEMIDE 40 MG
40 TABLET ORAL DAILY
Refills: 0 | Status: DISCONTINUED | OUTPATIENT
Start: 2021-06-08 | End: 2021-06-12

## 2021-06-08 RX ORDER — CLONAZEPAM 1 MG
0.5 TABLET ORAL EVERY 12 HOURS
Refills: 0 | Status: COMPLETED | OUTPATIENT
Start: 2021-06-08 | End: 2021-06-15

## 2021-06-08 RX ORDER — APIXABAN 2.5 MG/1
5 TABLET, FILM COATED ORAL EVERY 12 HOURS
Refills: 0 | Status: DISCONTINUED | OUTPATIENT
Start: 2021-06-08 | End: 2021-06-12

## 2021-06-08 RX ORDER — AMLODIPINE BESYLATE 2.5 MG/1
10 TABLET ORAL DAILY
Refills: 0 | Status: DISCONTINUED | OUTPATIENT
Start: 2021-06-08 | End: 2021-06-12

## 2021-06-08 RX ORDER — SACUBITRIL AND VALSARTAN 24; 26 MG/1; MG/1
1 TABLET, FILM COATED ORAL
Refills: 0 | Status: DISCONTINUED | OUTPATIENT
Start: 2021-06-08 | End: 2021-06-12

## 2021-06-08 RX ORDER — METOPROLOL TARTRATE 50 MG
50 TABLET ORAL ONCE
Refills: 0 | Status: COMPLETED | OUTPATIENT
Start: 2021-06-08 | End: 2021-06-08

## 2021-06-08 RX ORDER — DILTIAZEM HCL 120 MG
20 CAPSULE, EXT RELEASE 24 HR ORAL ONCE
Refills: 0 | Status: COMPLETED | OUTPATIENT
Start: 2021-06-08 | End: 2021-06-08

## 2021-06-08 RX ORDER — SERTRALINE 25 MG/1
25 TABLET, FILM COATED ORAL EVERY 12 HOURS
Refills: 0 | Status: DISCONTINUED | OUTPATIENT
Start: 2021-06-08 | End: 2021-06-12

## 2021-06-08 RX ORDER — ATORVASTATIN CALCIUM 80 MG/1
20 TABLET, FILM COATED ORAL AT BEDTIME
Refills: 0 | Status: DISCONTINUED | OUTPATIENT
Start: 2021-06-08 | End: 2021-06-12

## 2021-06-08 RX ADMIN — Medication 25 MILLIGRAM(S): at 18:17

## 2021-06-08 RX ADMIN — Medication 20 MILLIGRAM(S): at 10:36

## 2021-06-08 RX ADMIN — Medication 50 MILLIGRAM(S): at 16:45

## 2021-06-08 NOTE — CONSULT NOTE ADULT - SUBJECTIVE AND OBJECTIVE BOX
[place] [facility]                                                               Office:  [address] [place] [address]                                                              Telephone: [phone]. Fax:[phone]                                                                        CARDIOLOGY CONSULTATION NOTE                                                                                             Consult requested by:  Dr. Bello  Reason for Consultation: Atrial Fibrillation with RVR  Primary Cardiologist: Dr. Akash Hinson  PCP: Unknown  History obtained by: Patient and medical record   obtained: No    Covid Status: Pending    Chief complaint:    Patient is a 60y old  Male who presents with a chief complaint of palpitations.       HPI: 59 y/o M with a PMHx of mild non-obstructive CAD ([facility] 01/2021), HFrecEF (EF now 55-60% 03/21), Afib (on Eliquis), severe pulmonary HTN, obesity, alcoholism, and depression who presented to the ER with complaints of palpitations. Patient states that he has been very stressed recently and has been having a hard time emotionally lately. Patient was discharged from rehab on 05/31/21, and states he has been taking all of his medications. Patient states that this morning he had right sided chest palpitations, and feels his heart rate up into his neck. Patient denies any fevers, chills, CP, SOB, orthopnea, PND, abdominal pain, N/V/D, headache, or dizziness.     REVIEW OF SYMPTOMS:     CONSTITUTIONAL: No fever, weight loss, or fatigue  ENMT:  No difficulty hearing, tinnitus, vertigo; No sinus or throat pain  NECK: No pain or stiffness  CARDIOVASCULAR: AS PER HPI  RESPIRATORY: AS PER HPI  : No dysuria, no hematuria   GI: No dark color stool, no melena, no diarrhea, no constipation, no abdominal pain   NEURO: No headache, no dizziness, no slurred speech   MUSCULOSKELETAL: No joint pain or swelling; No muscle, back, or extremity pain  PSYCH: No agitation, no anxiety.    ALL OTHER REVIEW OF SYSTEMS ARE NEGATIVE.      PREVIOUS DIAGNOSTIC TESTING  ECHO FINDINGS:  < from: TTE Echo Complete w/o Contrast w/ Doppler (03.12.21 @ 21:12) >  PHYSICIAN INTERPRETATION:  Left Ventricle: The left ventricular internal cavity size is normal.  Global LV systolic function was normal. Left ventricular ejection fraction, by visual estimation, is 55 to 60%. Spectral Doppler shows pseudonormal pattern of left ventricular myocardial filling (Grade IIdiastolic dysfunction).  Right Ventricle: Normal right ventricular size and function.  Left Atrium: Moderately enlarged left atrium.  Right Atrium: Mildly enlarged right atrium.  Pericardium: There is no evidence of pericardial effusion.  Mitral Valve: Mitral leaflet mobility is normal.  Tricuspid Valve: Trivial tricuspid regurgitation is visualized.  Aortic Valve: The aortic valve is trileaflet. Sclerotic aortic valve with normal opening. No evidence of aortic valve regurgitation is seen.  Pulmonic Valve: Trace pulmonic valve regurgitation.  Aorta: The aortic root is normal in size and structure.  Pulmonary Artery: The main pulmonary artery is normal in size. Normal pulmonary artery pressure.  Venous: The inferior vena cava was normal sized,with respiratory size variation greater than 50%.      Summary:   1. Moderately enlarged left atrium.   2. There is mild concentric left ventricular hypertrophy.   3. Normal wall motion. Left ventricular ejection fraction, by visual estimation, is 55to 60%. Grade II diastolic dysfunction.   4. Mildly enlarged right atrium.   5. Normal right ventricular size and function.   6. No significant valvular abnormality.   7. There is no evidence of pericardial effusion.    MD Joe, RPVI Electronically signed on 3/13/2021 at 12:49:31 PM    < end of copied text >      STRESS FINDINGS:      CATHETERIZATION FINDINGS:   PREVIOUS CATH REPORT  1/13/2000  LAD, CIRC, RCA  Luminal irregularities   Severe pulm htn      ALLERGIES: Allergies    No Known Allergies    Intolerances          PAST MEDICAL HISTORY  HTN (hypertension)    CHF (congestive heart failure)    Atrial fibrillation    Depression, unspecified depression type    Pulmonary hypertension    Hyperlipidemia        PAST SURGICAL HISTORY  No significant past surgical history        FAMILY HISTORY:  No pertinent family history in first degree relatives        SOCIAL HISTORY:  Denies smoking/alcohol/drugs  CIGARETTES:     ALCOHOL:  DRUGS:      CURRENT MEDICATIONS:           HOME MEDICATIONS:      Vital Signs Last 24 Hrs  T(C): 36.7 (08 Jun 2021 10:05), Max: 36.7 (08 Jun 2021 10:05)  T(F): 98 (08 Jun 2021 10:05), Max: 98 (08 Jun 2021 10:05)  HR: 107 (08 Jun 2021 11:19) (97 - 144)  BP: 108/68 (08 Jun 2021 11:19) (99/69 - 149/79)  BP(mean): 80 (08 Jun 2021 10:39) (80 - 80)  RR: 16 (08 Jun 2021 11:19) (16 - 20)  SpO2: 100% (08 Jun 2021 11:19) (98% - 100%)      PHYSICAL EXAM:  Constitutional: Comfortable . No acute distress.   HEENT: Atraumatic and normocephalic , neck is supple . no JVD. No carotid bruit. PEERL   CNS: A&Ox3. No focal deficits. EOMI. Cranial nerves II-IX are intact.   Lymph Nodes: Cervical : Not palpable.  Respiratory: CTAB  Cardiovascular: S1S2 RRR. No murmur/rubs or gallop.  Gastrointestinal: Soft non-tender and non distended . +Bowel sounds. negative Nascimento's sign.  Extremities: No edema.   Psychiatric: Calm . no agitation.  Skin: No skin rash/ulcers visualized to face, hands or feet.    Intake and output:     LABS:                        13.0   14.06 )-----------( 234      ( 08 Jun 2021 11:08 )             39.2             ;p-BNP=        INTERPRETATION OF TELEMETRY: Reviewed by me.   ECG: Reviewed by me.     RADIOLOGY & ADDITIONAL STUDIES:    X-ray:  reviewed by me.   CT scan:   MRI:                                                                          [place] [facility]                                                               Office:  [address] [place] [address]                                                              Telephone: [phone]. Fax:[phone]                                                                        CARDIOLOGY CONSULTATION NOTE                                                                                             Consult requested by:  Dr. Bello  Reason for Consultation: Atrial Fibrillation with RVR  Primary Cardiologist: Dr. Akash Hinson  PCP: Unknown  History obtained by: Patient and medical record   obtained: No    Covid Status: Pending    Chief complaint:    Patient is a 60y old  Male who presents with a chief complaint of palpitations.       HPI: 61 y/o M with a PMHx of mild non-obstructive CAD, HFrecEF (EF now 55-60% 03/21), Afib (on Eliquis), severe pulmonary HTN, obesity, alcoholism, and depression who presented to the ER with complaints of palpitations. Patient states that he has been very stressed recently and has been having a hard time emotionally lately. Patient was discharged from rehab on 05/31/21, and states he has been taking all of his medications. Patient states that this morning he had right sided chest palpitations, and feels his heart rate up into his neck. Patient denies any fevers, chills, CP, SOB, orthopnea, PND, abdominal pain, N/V/D, headache, or dizziness.     REVIEW OF SYMPTOMS:     CONSTITUTIONAL: No fever, weight loss, or fatigue  ENMT:  No difficulty hearing, tinnitus, vertigo; No sinus or throat pain  NECK: No pain or stiffness  CARDIOVASCULAR: AS PER HPI  RESPIRATORY: AS PER HPI  : No dysuria, no hematuria   GI: No dark color stool, no melena, no diarrhea, no constipation, no abdominal pain   NEURO: No headache, no dizziness, no slurred speech   MUSCULOSKELETAL: No joint pain or swelling; No muscle, back, or extremity pain  PSYCH: No agitation, no anxiety.    ALL OTHER REVIEW OF SYSTEMS ARE NEGATIVE.      PREVIOUS DIAGNOSTIC TESTING  ECHO FINDINGS:  < from: TTE Echo Complete w/o Contrast w/ Doppler (03.12.21 @ 21:12) >  PHYSICIAN INTERPRETATION:  Left Ventricle: The left ventricular internal cavity size is normal.  Global LV systolic function was normal. Left ventricular ejection fraction, by visual estimation, is 55 to 60%. Spectral Doppler shows pseudonormal pattern of left ventricular myocardial filling (Grade IIdiastolic dysfunction).  Right Ventricle: Normal right ventricular size and function.  Left Atrium: Moderately enlarged left atrium.  Right Atrium: Mildly enlarged right atrium.  Pericardium: There is no evidence of pericardial effusion.  Mitral Valve: Mitral leaflet mobility is normal.  Tricuspid Valve: Trivial tricuspid regurgitation is visualized.  Aortic Valve: The aortic valve is trileaflet. Sclerotic aortic valve with normal opening. No evidence of aortic valve regurgitation is seen.  Pulmonic Valve: Trace pulmonic valve regurgitation.  Aorta: The aortic root is normal in size and structure.  Pulmonary Artery: The main pulmonary artery is normal in size. Normal pulmonary artery pressure.  Venous: The inferior vena cava was normal sized,with respiratory size variation greater than 50%.      Summary:   1. Moderately enlarged left atrium.   2. There is mild concentric left ventricular hypertrophy.   3. Normal wall motion. Left ventricular ejection fraction, by visual estimation, is 55to 60%. Grade II diastolic dysfunction.   4. Mildly enlarged right atrium.   5. Normal right ventricular size and function.   6. No significant valvular abnormality.   7. There is no evidence of pericardial effusion.    MD Joe, RPVI Electronically signed on 3/13/2021 at 12:49:31 PM    < end of copied text >      CATHETERIZATION FINDINGS:   PREVIOUS CATH REPORT  1/13/2000  LAD, CIRC, RCA  Luminal irregularities   Severe pulm htn      ALLERGIES: Allergies    No Known Allergies    Intolerances          PAST MEDICAL HISTORY  HTN (hypertension)    CHF (congestive heart failure)    Atrial fibrillation    Depression, unspecified depression type    Pulmonary hypertension    Hyperlipidemia        PAST SURGICAL HISTORY  No significant past surgical history        FAMILY HISTORY:  No pertinent family history in first degree relatives    Social History:  Smokes 2 PPD  Etoh 1 pint of vodka day  Next of kin is his sister Latrice Hussein  at       CURRENT MEDICATIONS:           HOME MEDICATIONS:  Eliquis 5 mg oral tablet: 1 tab(s) orally 2 times a day   Entresto 97 mg-103 mg oral tablet: 1 tab(s) orally 2 times a day   Metoprolol Succinate  mg oral tablet, extended release: 1 tab(s) orally once a day   penicillin V potassium 500 mg oral tablet: 1 tab(s) orally 2 times a day  Ultram 50 mg oral tablet: 1 tab(s) orally every 8 hours MDD:3      Vital Signs Last 24 Hrs  T(C): 36.7 (08 Jun 2021 10:05), Max: 36.7 (08 Jun 2021 10:05)  T(F): 98 (08 Jun 2021 10:05), Max: 98 (08 Jun 2021 10:05)  HR: 107 (08 Jun 2021 11:19) (97 - 144)  BP: 108/68 (08 Jun 2021 11:19) (99/69 - 149/79)  BP(mean): 80 (08 Jun 2021 10:39) (80 - 80)  RR: 16 (08 Jun 2021 11:19) (16 - 20)  SpO2: 100% (08 Jun 2021 11:19) (98% - 100%)      PHYSICAL EXAM:  Constitutional: Comfortable . No acute distress.   HEENT: Atraumatic and normocephalic , neck is supple . no JVD. No carotid bruit. PEERL   CNS: A&Ox3. No focal deficits. EOMI. Cranial nerves II-IX are intact.   Lymph Nodes: Cervical : Not palpable.  Respiratory: CTAB  Cardiovascular: Irregularly irregular, No murmur/rubs or gallop.  Gastrointestinal: Soft non-tender and non distended . +Bowel sounds. negative Nascimento's sign.  Extremities: No edema.   Psychiatric: Calm . no agitation.  Skin: No skin rash/ulcers visualized to face, hands or feet.    Intake and output:     LABS:                        13.0   14.06 )-----------( 234      ( 08 Jun 2021 11:08 )             39.2             ;p-BNP=        INTERPRETATION OF TELEMETRY: Reviewed by me. Afib with RVR HR 80-100s  ECG: Reviewed by me. Afib with RVR, PRWP    RADIOLOGY & ADDITIONAL STUDIES:    X-ray:  reviewed by me.     CT scan:   < from: CT Head No Cont (06.08.21 @ 10:21) >  INTERPRETATION:  CLINICAL Indications:  fall    COMPARISON: None.    TECHNIQUE: Noncontrast CT of the head. Multiplanar reformations are submitted.    FINDINGS:  There is periventricular and subcortical white matter hypodensity without mass effect, nonspecific, likely representing severe chronic microvascular ischemic changes. There is no compelling evidence for an acute transcortical infarction. There is no evidence of mass, mass effect, midline shift or extra-axial fluid collection. The lateral ventricles and cortical sulci are age-appropriate in size and configuration. The orbits, mastoid air cells and visualized paranasal sinuses are normal. The calvarium is intact. Consider follow-up CT or MRI as clinically warranted.          ============================    CLINICAL INDICATIONS: fall    COMPARISON: None.    TECHNIQUE: Noncontrast CT of the cervical spine.Multiple contiguous axial images through the cervical spine as well as multiplanar reformatted images are submitted for interpretation without the administration of intravenous contrast. 3-D images.    FINDINGS: Mild chronic height loss involves the C4 and C5 vertebral bodies. Small to moderate anterior bridging osteophytes at the C4/C5-C6/C7 levels. Moderate loss of disc space height at the C6/C7 level. Moderate multilevel facet hypertrophy. Moderate multilevel uncovertebral hypertrophy.    There is no evidence for acute fracture. A normal lordosis is noted. Craniocervical junction is normal. The remaining cervicovertebral body heights and remaining intervertebral disc spaces are preserved. There is no prevertebral soft tissue abnormality. The odontoid process is intact.    Thyroid gland is unremarkable. The airway is patent. The upper lung apices are unremarkable. Chronic posterior left first rib fracture deformity. Mild left carotid bulb calcifications.    Evaluation of the individual levels:    C2/C3 level: No spinal canal stenosis or foraminal narrowing.    C3/C4 level: Broad-based ridging causing severe right-sided foraminal narrowing. No spinal canal stenosis or left-sided foraminal narrowing.    C4/C5 level: Broad-based ridging causing moderate right-sided foraminal narrowing. No spinal canal stenosis or left-sided foraminal narrowing.    C5/C6 level: Broad-based ridging causing moderate right-sided foraminal narrowing. No spinal canal stenosis or left-sided foraminal narrowing.    C6/C7 level: Broad-based ridging causing moderate bilateral foraminal narrowing. No spinal canal stenosis.    C7/T1 level: No spinal canal stenosis or foraminal narrowing.      IMPRESSION:    CT head:Severe chronic microvascular changes without evidence of an acute transcortical infarction or hemorrhage.    CT C-spine: Moderate to severe multilevel spondylosis. No acute osseous abnormality. Consider MRI as clinically warranted.        < end of copied text >    MRI:

## 2021-06-08 NOTE — ED ADULT NURSE REASSESSMENT NOTE - COMFORT CARE
ambulated to bathroom/plan of care explained/po fluids offered/side rails up/wait time explained
assisted to bathroom/meal provided/plan of care explained/po fluids offered/wait time explained

## 2021-06-08 NOTE — ED PROVIDER NOTE - NS ED ROS FT
General: Denies fever, chills  HEENT: Denies visual changes, sore throat  Neck: Denies neck pain, neck stiffness  Resp: Denies coughing, SOB  Cardiovascular: Endorses chest discomfort, palpitations. Denies CP, LE edema  GI: Denies abdominal pain, nausea, vomiting, diarrhea  : Denies dysuria, hematuria, incontinence  MSK: Denies back pain  Neuro: Endorses lightheadedness. Denies HA, numbness, weakness  Skin: Denies rashes

## 2021-06-08 NOTE — ED CDU PROVIDER INITIAL DAY NOTE - DETAILS
60m with hx of afib presented to ED with Afib with rvr ( resolved s/p IV Cardizem ). Pt evaluated by SS cards who recommended NST in AM.

## 2021-06-08 NOTE — ED ADULT NURSE REASSESSMENT NOTE - NS ED NURSE REASSESS COMMENT FT1
COVID swab sent.    repeat EKG performed.   report given to Franklin GILBERT  pt transferred to [facility]
Pt ask to leave JAJA Villalobos called to bedside Pt will leave AMA, will rpt vitals Pt instructed on the risks of leaving AMA by PA and RN Pt still request to leave
Pt care assumed.  Pt currently denies cp, sob. Pt afib on monitor.
Received report from OFELIA Alanis. Pt to be moved to CDU for observation.
pt reports improvement of chest discomfort at this time
Assumed care of the Pt at 1930, Verbal report received from OFELIA Bell. Pt is AOx4 in no acute distress, Pt Denies any chest pain, shortness of breath, nausea or dizziness at this time. Pt VSS, PIV patent. Pt CIWA 0 at this time no complaints for RN. As per monitor tech Pt is NSR with a rate in the 70s. Pt given call bell, call bell within reach, Pt instructed to use call bell when assistance is needed. Pt pending Rpt Trop and EKG and NM stress test in the Am. Plan of car explained, wait time explained, Pt in understanding of plan of care will continue to monitor.

## 2021-06-08 NOTE — ED CDU PROVIDER DISPOSITION NOTE - PATIENT PORTAL LINK FT
You can access the FollowMyHealth Patient Portal offered by [facility] by registering at the following website: [email or web address]. By joining [facility]’s FollowMyHealth portal, you will also be able to view your health information using other applications (apps) compatible with our system.

## 2021-06-08 NOTE — ED PROVIDER NOTE - OBJECTIVE STATEMENT
Pt is a 61yo M presenting with R chest discomfort. He states that he has a history of afib and chf. Reports that over the last 3 days he has been feeling R chest discomfort radiating to the R neck. He states that it is not painful but more of a sensation. He reports that last time he was in afib he felt similarly. Today he was taking his medications and felt lightheaded and fell. He did not pass out but he did hit his head. He says that he is compliant with his medications and takes them regularly. Patient's cardiologist is Akash Dennis. Patient denies worsening swelling in the legs, nausea, vomiting.

## 2021-06-08 NOTE — ED CDU PROVIDER INITIAL DAY NOTE - MEDICAL DECISION MAKING DETAILS
60m with hx of afib presented to ED with Afib with rvr ( resolved s/p IV Cardizem ). Pt evaluated by SS cards who recommended NST in AM. Pt admits to drinking daily ( 4 beers daily ), no evidence of withdrawals at this time, will give PO librium and monitor on CIWA while in CDU.

## 2021-06-08 NOTE — CONSULT NOTE ADULT - ASSESSMENT
A/P: 59 y/o M with a PMHx of mild non-obstructive CAD, HFrecEF (EF now 55-60% 03/21), Afib (on Eliquis), severe pulmonary HTN, obesity, alcoholism, and depression who presented to the ER with complaints of palpitations. Patient states that he has been very stressed recently and has been having a hard time emotionally lately. Patient was discharged from rehab on 05/31/21, and states he has been taking all of his medications. Patient states that this morning he had right sided chest palpitations, and feels his heart rate up into his neck. Patient denies any fevers, chills, CP, SOB, orthopnea, PND, abdominal pain, N/V/D, headache, or dizziness.   Bloodwork pending    Atrial Fibrillation with RVR  - HR better controlled after IV diltiazem.   - Continue Toprol XL 200mg PO qAM,   - Can add Toprol XL 50mg PO qPM if needed for further rate control.   - Continue Eliquis.   - Continue telemetry monitoring.   - No need for Echo.     HFpEF  - EF recovered to 55-60% on echo 03/2021.   - Continue Entresto and Toprol XL.   - Does not appear overloaded.   - Bloodwork pending.     Chest Pain  - Atypical chest pain.   - Troponins pending, awaiting bloodwork. Continue to trend.  - NPO after midnight.   - Nuclear stress test tomorrow.     Assessment and recommendations are final when note is signed by the attending.

## 2021-06-08 NOTE — ED CDU PROVIDER DISPOSITION NOTE - ATTENDING CONTRIBUTION TO CARE
I, Phylicia Hernandez MD have personally performed a face to face diagnostic evaluation on this patient.  I have reviewed the ACP note and agree with the history, exam, and plan of care, except as noted.     Exam:  Head: atraumatic, normacephalic  Face: atraumatic, no crepitus no orbiral/maxillary/mandibular ttp  throat: uvula midline no exudates  eyes: perrla eomi  heart: irregularly irregular s1s2  lungs: ctab  abd: soft, nt nd +bs no rebound/guarding no cva ttp  skin: warm  LE: no swelling, no calf ttp  back: no midline cervical/thoracic/lumbar ttp    61yo M, PMH significant for HTN, HLD, Afib ( eliquis ), presenting with R chest discomfort. in obs for afib and cp cardiology evaluated pending nuclear stress in the am AFIB rate controlled now with meds--tele trops nuclear stress test -- pt does not want to wait will leave ama has capacity   The patient has decided to leave against medical advice.  The patient is AAOx3, not intoxicated, and displays normal decision making ability. We discussed all risks, benefits, and alternatives to the progression of treatment and the potential dangers of leaving including but not limited to permanent disability, injury, and death.  The patient was instructed that they are welcome to change their decision to leave against medical advice and return to the emergency department at any time and for any reason in order to allow us to render care.

## 2021-06-08 NOTE — ED ADULT NURSE REASSESSMENT NOTE - NSIMPLEMENTINTERV_GEN_ALL_ED
Implemented All Universal Safety Interventions:  [place] to call system. Call bell, personal items and telephone within reach. Instruct patient to call for assistance. Room bathroom lighting operational. Non-slip footwear when patient is off stretcher. Physically safe environment: no spills, clutter or unnecessary equipment. Stretcher in lowest position, wheels locked, appropriate side rails in place.
Implemented All Fall Risk Interventions:  [place] to call system. Call bell, personal items and telephone within reach. Instruct patient to call for assistance. Room bathroom lighting operational. Non-slip footwear when patient is off stretcher. Physically safe environment: no spills, clutter or unnecessary equipment. Stretcher in lowest position, wheels locked, appropriate side rails in place. Provide visual cue, wrist band, yellow gown, etc. Monitor gait and stability. Monitor for mental status changes and reorient to person, place, and time. Review medications for side effects contributing to fall risk. Reinforce activity limits and safety measures with patient and family.

## 2021-06-08 NOTE — ED CDU PROVIDER INITIAL DAY NOTE - PROGRESS NOTE DETAILS
Patient in NAD, covered to NSR at 1930, HR in 70s, pending NST. Received call from RN, patient wishing to sign out AMA despite multiple attempts to convince otherwise. I had a lengthy discussion with the patient. The patient wishes to leave at this time. The patient verbalizes understanding that he/she is leaving against medical advice despite the risk of missing a potential serious diagnosis which may lead to injury, disability, and/or death. I discussed with the patient which tests may need to be performed and what type of monitoring would be necessary for the patient. I was unable to convince the patient to stay for further work-up. The patient is alert and oriented and demonstrates competence in making medical decisions. Received call from RN, patient wishing to sign out AMA despite multiple attempts to convince otherwise. Patient feels well and states he knows "what's going to happen, the stress test will be negative." I had a lengthy discussion with the patient. The patient wishes to leave at this time. The patient verbalizes understanding that he/she is leaving against medical advice despite the risk of missing a potential serious diagnosis which may lead to injury, disability, and/or death. I discussed with the patient which tests may need to be performed and what type of monitoring would be necessary for the patient. I was unable to convince the patient to stay for further work-up. The patient is alert and oriented and demonstrates competence in making medical decisions.

## 2021-06-08 NOTE — ED PROVIDER NOTE - ATTENDING CONTRIBUTION TO CARE
I personally saw the patient with the resident, and completed the key components of the history and physical exam. I then discussed the management plan with the resident.    61 y/o M with A fib on Eliquis presents for right chest discomfort x 3 days that is intermittent, found to be in rapid A fib. Rate controlled with cardizem. Well appearing, irregularly irregular rate and rhythm, lungs clear, 1+ b/l LE edema, symmetrical pulses. Will rule out with cycle of trop - patient can follow up with cardiology as outpatient.

## 2021-06-08 NOTE — ED ADULT NURSE NOTE - CHIEF COMPLAINT QUOTE
Pt arrives to ED c/o R sided  chest " intervascular muscle pain"  Hx Afib Denies SOB . AT the end of the Triage pt states " I fell down this morning and I hit by head on the closet " Pt on blood thinners

## 2021-06-08 NOTE — ED ADULT NURSE NOTE - NSFALLRSKASSESSTYPE_ED_ALL_ED
Discussion/Summary  Normal device function     Planned to increase intrinsic   no increase in intrinsic conduction even at 300 ms AV delay  Results/Data    [id number] 04:52PM   Cardiac Device In Clinic   MISCELLANEOUS COMMENT: DEVICE INTERROGATED IN THE [facility] OFFICE  PER DR FLORES----TURNED OFF RATE CHANGED FROM 50  TO 60 BPM  AVD CHANGED TO 300ms AND NO INTRINSIC BEATS  SEEN   AVD RETURNED----APNDA   Cardiac Electrophysiology Report:  DEVICE TYPE: Pacemaker   Cardiac Electrophysiology Report   Cardiac Electrophysiology Report:    Signatures   Electronically signed by : Radha Washburn, ; Dec  5 2016  1:02PM EST                       (Author)    Electronically signed by : MANJEET Waite ; Jan 9 2017 10:31PM EST                       (Author)
Initial (On Arrival)

## 2021-06-08 NOTE — ED CDU PROVIDER DISPOSITION NOTE - CLINICAL COURSE
59 yo male PMHx HTN, HLD, Afib (Eliquis) presented to ED c/o right-sided chest discomfort. Found to be in A-fib with RVR. Received Cardizem IV with improvement. Patient with troponin x2 negative. Patient evaluated by [facility]ology, recommended NST. Patient converted to NSR HR in 70s, now to sign out AMA despite multiple attempts to convince otherwise. Patient understands he is leaving AMA and may return at any time. Patient provided copies of results thus far.

## 2021-06-08 NOTE — ED ADULT NURSE REASSESSMENT NOTE - GENERAL PATIENT STATE
comfortable appearance/cooperative/resting/sleeping/smiling/interactive
comfortable appearance/cooperative

## 2021-06-08 NOTE — ED ADULT TRIAGE NOTE - CHIEF COMPLAINT QUOTE
Pt arrives to ED c/o R sided  chest " intervascular muscle pain"  Hx Afib Denies SOB . AT the end of the Triage pt states " I fell down this morning and I hit by head on the closet " Pt on blood thinners Pt arrives to ED c/o R sided  chest " intravascular muscle pain"  Hx Afib Denies SOB . AT the end of the Triage pt states " I fell down this morning and I hit my head on the closet door " Pt on blood thinners

## 2021-06-08 NOTE — ED PROVIDER NOTE - CLINICAL SUMMARY MEDICAL DECISION MAKING FREE TEXT BOX
Patient in rapid afib. Called [facility] for consult. Labwork sent. Priority CT of head/cspine for fall. Cardizem given and HR improved to .

## 2021-06-08 NOTE — ED PROVIDER NOTE - PHYSICAL EXAMINATION
FYRUSSELL, we will call her
General: Well appearing male in no acute distress  HEENT: Normocephalic, atraumatic. Moist mucous membranes.   Eyes: No scleral icterus. No conjunctival pallor. EOMI. CESAR.  Neck: Soft and supple. Full ROM without pain. No midline tenderness  Cardiac: Irregular rate and rhythm. No murmurs. 1+ b/l LE edema.  Resp: Lungs CTAB. No wheezes, rales or rhonchi.  Abd: Soft, non-tender, non-distended. No guarding or rebound.   Skin: No rashes, abrasions, or lacerations.  Neuro: AO x 3. Moves all extremities symmetrically. Motor strength and sensation grossly intact.

## 2021-06-08 NOTE — ED ADULT NURSE NOTE - EXPLANATION OF PATIENT'S REASON FOR LEAVING
Pt stated that he felt his heart rate was normal and the test were not going to show anything that he did not know so he wanted to go home

## 2021-06-08 NOTE — ED CDU PROVIDER INITIAL DAY NOTE - CPE EDP SKIN NORM
Assessment/Plan  (H52.13) Myopia of both eyes  (primary encounter diagnosis)  Comment: Good comfort and vision  Plan:  Dispensed trial lenses and finalized contact lens prescription for 2 years.     Return to clinic in 1 year for comprehensive eye exam.    This visit billed as no-charge contact lens follow-up.    Complete documentation of historical and exam elements from today's encounter can  be found in the full encounter summary report (not reduplicated in this progress  note). I personally obtained the chief complaint(s) and history of present illness. I  confirmed and edited as necessary the review of systems, past medical/surgical  history, family history, social history, and examination findings as documented by  others; and I examined the patient myself. I personally reviewed the relevant tests,  images, and reports as documented above. I formulated and edited as necessary the  assessment and plan and discussed the findings and management plan with the  patient and family.    Denny Whelan OD, FAAO   normal...

## 2021-06-08 NOTE — ED CDU PROVIDER DISPOSITION NOTE - NSFOLLOWUPINSTRUCTIONS_ED_ALL_ED_FT
- You are leaving against medical advice and may return to the ED at any time.   - Continue compliance with home medications.   - Please bring all documentation from your ED visit to any related future follow up appointment.  - Please call to schedule follow up appointment with your primary care physician within 24-48 hours.  - Please seek immediate medical attention for any new/worsening, signs/symptoms, or concerns.      Atrial Fibrillation       Atrial fibrillation is a type of irregular or rapid heartbeat (arrhythmia). In atrial fibrillation, the top part of the heart (atria) beats in an irregular pattern. This makes the heart unable to pump blood normally and effectively.    The goal of treatment is to prevent blood clots from forming, control your heart rate, or restore your heartbeat to a normal rhythm. If this condition is not treated, it can cause serious problems, such as a weakened heart muscle (cardiomyopathy) or a stroke.      What are the causes?  This condition is often caused by medical conditions that damage the heart's electrical system. These include:  •High blood pressure (hypertension). This is the most common cause.      •Certain heart problems or conditions, such as heart failure, coronary artery disease, heart valve problems, or heart surgery.      •Diabetes.      •Overactive thyroid (hyperthyroidism).      •Obesity.      •Chronic kidney disease.      In some cases, the cause of this condition is not known.      What increases the risk?  This condition is more likely to develop in:  •Older people.      •People who smoke.      •Athletes who do endurance exercise.      •People who have a family history of atrial fibrillation.      •Men.      •People who use drugs.      •People who drink a lot of alcohol.      •People who have lung conditions, such as emphysema, pneumonia, or COPD.      •People who have obstructive sleep apnea.        What are the signs or symptoms?  Symptoms of this condition include:  •A feeling that your heart is racing or beating irregularly.      •Discomfort or pain in your chest.      •Shortness of breath.      •Sudden light-headedness or weakness.      •Tiring easily during exercise or activity.      •Fatigue.      •Syncope (fainting).      •Sweating.      In some cases, there are no symptoms.      How is this diagnosed?  Your health care provider may detect atrial fibrillation when taking your pulse. If detected, this condition may be diagnosed with:  •An electrocardiogram (ECG) to check electrical signals of the heart.      •An ambulatory cardiac monitor to record your heart's activity for a few days.      •A transthoracic echocardiogram (TTE) to create pictures of your heart.      •A transesophageal echocardiogram (ELIGIO) to create even closer pictures of your heart.      •A stress test to check your blood supply while you exercise.      •Imaging tests, such as a CT scan or chest X-ray.      •Blood tests.        How is this treated?  Treatment depends on underlying conditions and how you feel when you experience atrial fibrillation. This condition may be treated with:  •Medicines to prevent blood clots or to treat heart rate or heart rhythm problems.      •Electrical cardioversion to reset the heart's rhythm.      •A pacemaker to correct abnormal heart rhythm.      •Ablation to remove the heart tissue that sends abnormal signals.      •Left atrial appendage closure to seal the area where blood clots can form.      In some cases, underlying conditions will be treated.      Follow these instructions at home:    Medicines     •Take over-the counter and prescription medicines only as told by your health care provider.      • Do not take any new medicines without talking to your health care provider.    •If you are taking blood thinners:   • Talk with your health care provider before you take any medicines that contain aspirin or NSAIDs, such as ibuprofen. These medicines increase your risk for dangerous bleeding.       •Take your medicine exactly as told, at the same time every day.       •Avoid activities that could cause injury or bruising, and follow instructions about how to prevent falls.       •Wear a medical alert bracelet or carry a card that lists what medicines you take.          Lifestyle                   • Do not use any products that contain nicotine or tobacco, such as cigarettes, e-cigarettes, and chewing tobacco. If you need help quitting, ask your health care provider.      •Eat heart-healthy foods. Talk with a dietitian to make an eating plan that is right for you.      •Exercise regularly as told by your health care provider.      • Do not drink alcohol.      •Lose weight if you are overweight.      • Do not use drugs, including cannabis.      General instructions     •If you have obstructive sleep apnea, manage your condition as told by your health care provider.      • Do not use diet pills unless your health care provider approves. Diet pills can make heart problems worse.      •Keep all follow-up visits as told by your health care provider. This is important.        Contact a health care provider if you:    •Notice a change in the rate, rhythm, or strength of your heartbeat.      •Are taking a blood thinner and you notice more bruising.      •Tire more easily when you exercise or do heavy work.      •Have a sudden change in weight.        Get help right away if you have:     •Chest pain, abdominal pain, sweating, or weakness.      •Trouble breathing.      •Side effects of blood thinners, such as blood in your vomit, stool, or urine, or bleeding that cannot stop.    •Any symptoms of a stroke. "BE FAST" is an easy way to remember the main warning signs of a stroke:  •B - Balance. Signs are dizziness, sudden trouble walking, or loss of balance.      •E - Eyes. Signs are trouble seeing or a sudden change in vision.      •F - Face. Signs are sudden weakness or numbness of the face, or the face or eyelid drooping on one side.      •A - Arms. Signs are weakness or numbness in an arm. This happens suddenly and usually on one side of the body.      •S - Speech. Signs are sudden trouble speaking, slurred speech, or trouble understanding what people say.      •T - Time. Time to call emergency services. Write down what time symptoms started.      •Other signs of a stroke, such as:  •A sudden, severe headache with no known cause.      •Nausea or vomiting.      •Seizure.        These symptoms may represent a serious problem that is an emergency. Do not wait to see if the symptoms will go away. Get medical help right away. Call your local emergency services (911 in the [place] Do not drive yourself to the hospital.       Summary    •Atrial fibrillation is a type of irregular or rapid heartbeat (arrhythmia).      •Symptoms include a feeling that your heart is beating fast or irregularly.      •You may be given medicines to prevent blood clots or to treat heart rate or heart rhythm problems.      •Get help right away if you have signs or symptoms of a stroke.      •Get help right away if you cannot catch your breath or have chest pain or pressure.      This information is not intended to replace advice given to you by your health care provider. Make sure you discuss any questions you have with your health care provider.

## 2021-06-08 NOTE — ED CDU PROVIDER DISPOSITION NOTE - CARE PROVIDER_API CALL
Ramana Warner)  Cardiology; Cardiovascular Disease; Internal Medicine  [address] [place] [address]  Phone: [phone]  Fax: [phone]  Follow Up Time:

## 2021-06-08 NOTE — ED ADULT NURSE NOTE - OBJECTIVE STATEMENT
assumed patient care in critical care. pt was priority ct and had ekg prior. pt c/o 3 days right chest discomfort, states today lightheaded and fell. states did not pass out, unsure if hit head. pt on eliquis. pt still reports "mild" chest discomfort. pt in rapid afib on CM.

## 2021-06-08 NOTE — ED CDU PROVIDER INITIAL DAY NOTE - ATTENDING CONTRIBUTION TO CARE
I, Phylicia Hernandez MD have personally performed a face to face diagnostic evaluation on this patient.  I have reviewed the ACP note and agree with the history, exam, and plan of care, except as noted.     Exam:  Head: atraumatic, normacephalic  Face: atraumatic, no crepitus no orbiral/maxillary/mandibular ttp  throat: uvula midline no exudates  eyes: perrla eomi  heart: irregularly irregular s1s2  lungs: ctab  abd: soft, nt nd +bs no rebound/guarding no cva ttp  skin: warm  LE: no swelling, no calf ttp  back: no midline cervical/thoracic/lumbar ttp    61yo M, PMH significant for HTN, HLD, Afib ( eliquis ), presenting with R chest discomfort. in obs for afib and cp cardiology evaluated pending nuclear stress in the am AFIB rate controlled now with meds--tele [facility] nuclear stress test

## 2021-06-08 NOTE — ED CDU PROVIDER INITIAL DAY NOTE - OBJECTIVE STATEMENT
59yo M, PMH significant for HTN, HLD, Afib ( eliquis ), presenting with R chest discomfort. Pt reports that over the last 3 days he has been feeling R chest discomfort radiating to the R neck. He states that it is not painful but more of a sensation. He reports that last time he was in afib he felt similarly. Today he was taking his medications and felt lightheaded and fell. He did not pass out but he did hit his head. He says that he is compliant with his medications and takes them regularly. Patient's cardiologist is Akash Dennis. Patient denies worsening swelling in the legs, nausea, vomiting

## 2021-06-15 ENCOUNTER — INPATIENT (INPATIENT)
Facility: HOSPITAL | Age: 61
LOS: 2 days | Discharge: LEFT BEFORE DISCHARGE | DRG: 314 | End: 2021-06-18
Attending: INTERNAL MEDICINE | Admitting: HOSPITALIST
Payer: MEDICARE

## 2021-06-15 VITALS
OXYGEN SATURATION: 97 % | WEIGHT: 229.94 LBS | SYSTOLIC BLOOD PRESSURE: 137 MMHG | RESPIRATION RATE: 22 BRPM | TEMPERATURE: 99 F | HEART RATE: 112 BPM | HEIGHT: 67 IN | DIASTOLIC BLOOD PRESSURE: 65 MMHG

## 2021-06-15 PROCEDURE — 99291 CRITICAL CARE FIRST HOUR: CPT

## 2021-06-16 ENCOUNTER — RESULT REVIEW (OUTPATIENT)
Age: 61
End: 2021-06-16

## 2021-06-16 DIAGNOSIS — I48.0 PAROXYSMAL ATRIAL FIBRILLATION: ICD-10-CM

## 2021-06-16 LAB
ALBUMIN FLD-MCNC: 3.2 G/DL — SIGNIFICANT CHANGE UP
ALBUMIN SERPL ELPH-MCNC: 3.8 G/DL — SIGNIFICANT CHANGE UP (ref 3.3–5.2)
ALLERGY+IMMUNOLOGY DIAG STUDY NOTE: SIGNIFICANT CHANGE UP
ALLERGY+IMMUNOLOGY DIAG STUDY NOTE: SIGNIFICANT CHANGE UP
ALP SERPL-CCNC: 73 U/L — SIGNIFICANT CHANGE UP (ref 40–120)
ALT FLD-CCNC: 30 U/L — SIGNIFICANT CHANGE UP
ANION GAP SERPL CALC-SCNC: 16 MMOL/L — SIGNIFICANT CHANGE UP (ref 5–17)
APTT BLD: 33.4 SEC — SIGNIFICANT CHANGE UP (ref 27.5–35.5)
AST SERPL-CCNC: 29 U/L — SIGNIFICANT CHANGE UP
B PERT IGG+IGM PNL SER: ABNORMAL
BASOPHILS # BLD AUTO: 0 K/UL — SIGNIFICANT CHANGE UP (ref 0–0.2)
BASOPHILS NFR BLD AUTO: 0 % — SIGNIFICANT CHANGE UP (ref 0–2)
BILIRUB SERPL-MCNC: 0.3 MG/DL — LOW (ref 0.4–2)
BLD GP AB SCN SERPL QL: SIGNIFICANT CHANGE UP
BUN SERPL-MCNC: 21.1 MG/DL — HIGH (ref 8–20)
CALCIUM SERPL-MCNC: 8.9 MG/DL — SIGNIFICANT CHANGE UP (ref 8.6–10.2)
CHLORIDE SERPL-SCNC: 101 MMOL/L — SIGNIFICANT CHANGE UP (ref 98–107)
CO2 SERPL-SCNC: 19 MMOL/L — LOW (ref 22–29)
COLOR FLD: ABNORMAL
CREAT SERPL-MCNC: 1.27 MG/DL — SIGNIFICANT CHANGE UP (ref 0.5–1.3)
DAT IGG-SP REAG RBC-IMP: ABNORMAL
DIR ANTIGLOB POLYSPECIFIC INTERPRETATION: ABNORMAL
EOSINOPHIL # BLD AUTO: 0 K/UL — SIGNIFICANT CHANGE UP (ref 0–0.5)
EOSINOPHIL NFR BLD AUTO: 0 % — SIGNIFICANT CHANGE UP (ref 0–6)
ETHANOL SERPL-MCNC: <10 MG/DL — SIGNIFICANT CHANGE UP (ref 0–9)
FLUID INTAKE SUBSTANCE CLASS: SIGNIFICANT CHANGE UP
FLUID SEGMENTED GRANULOCYTES: 98 % — SIGNIFICANT CHANGE UP
GIANT PLATELETS BLD QL SMEAR: PRESENT — SIGNIFICANT CHANGE UP
GLUCOSE FLD-MCNC: 67 MG/DL — SIGNIFICANT CHANGE UP
GLUCOSE SERPL-MCNC: 136 MG/DL — HIGH (ref 70–99)
GRAM STN FLD: SIGNIFICANT CHANGE UP
HCT VFR BLD CALC: 36.7 % — LOW (ref 39–50)
HGB BLD-MCNC: 12.3 G/DL — LOW (ref 13–17)
IAT COMP-SP REAG SERPL QL: SIGNIFICANT CHANGE UP
INR BLD: 1.65 RATIO — HIGH (ref 0.88–1.16)
LDH SERPL L TO P-CCNC: 748 U/L — SIGNIFICANT CHANGE UP
LIDOCAIN IGE QN: 18 U/L — LOW (ref 22–51)
LYMPHOCYTES # BLD AUTO: 0.93 K/UL — LOW (ref 1–3.3)
LYMPHOCYTES # BLD AUTO: 6.2 % — LOW (ref 13–44)
LYMPHOCYTES # FLD: 2 % — SIGNIFICANT CHANGE UP
MANUAL SMEAR VERIFICATION: SIGNIFICANT CHANGE UP
MCHC RBC-ENTMCNC: 29.2 PG — SIGNIFICANT CHANGE UP (ref 27–34)
MCHC RBC-ENTMCNC: 33.5 GM/DL — SIGNIFICANT CHANGE UP (ref 32–36)
MCV RBC AUTO: 87.2 FL — SIGNIFICANT CHANGE UP (ref 80–100)
MONOCYTES # BLD AUTO: 2.12 K/UL — HIGH (ref 0–0.9)
MONOCYTES NFR BLD AUTO: 14.1 % — HIGH (ref 2–14)
MYELOCYTES NFR BLD: 0.9 % — HIGH (ref 0–0)
NEUTROPHILS # BLD AUTO: 11.59 K/UL — HIGH (ref 1.8–7.4)
NEUTROPHILS NFR BLD AUTO: 77 % — SIGNIFICANT CHANGE UP (ref 43–77)
NIGHT BLUE STAIN TISS: SIGNIFICANT CHANGE UP
NT-PROBNP SERPL-SCNC: 540 PG/ML — HIGH (ref 0–300)
PLAT MORPH BLD: NORMAL — SIGNIFICANT CHANGE UP
PLATELET # BLD AUTO: 427 K/UL — HIGH (ref 150–400)
POTASSIUM SERPL-MCNC: 4.3 MMOL/L — SIGNIFICANT CHANGE UP (ref 3.5–5.3)
POTASSIUM SERPL-SCNC: 4.3 MMOL/L — SIGNIFICANT CHANGE UP (ref 3.5–5.3)
PROT FLD-MCNC: 5.4 G/DL — SIGNIFICANT CHANGE UP
PROT SERPL-MCNC: 6.8 G/DL — SIGNIFICANT CHANGE UP (ref 6.6–8.7)
PROTHROM AB SERPL-ACNC: 18.7 SEC — HIGH (ref 10.6–13.6)
RBC # BLD: 4.21 M/UL — SIGNIFICANT CHANGE UP (ref 4.2–5.8)
RBC # FLD: 13.5 % — SIGNIFICANT CHANGE UP (ref 10.3–14.5)
RBC BLD AUTO: NORMAL — SIGNIFICANT CHANGE UP
RCV VOL RI: HIGH /UL (ref 0–0)
SARS-COV-2 RNA SPEC QL NAA+PROBE: SIGNIFICANT CHANGE UP
SODIUM SERPL-SCNC: 136 MMOL/L — SIGNIFICANT CHANGE UP (ref 135–145)
SPECIMEN SOURCE: SIGNIFICANT CHANGE UP
SPECIMEN SOURCE: SIGNIFICANT CHANGE UP
TOTAL NUCLEATED CELL COUNT, BODY FLUID: SIGNIFICANT CHANGE UP /UL
TROPONIN T SERPL-MCNC: <0.01 NG/ML — SIGNIFICANT CHANGE UP (ref 0–0.06)
TUBE TYPE: SIGNIFICANT CHANGE UP
VARIANT LYMPHS # BLD: 1.8 % — SIGNIFICANT CHANGE UP (ref 0–6)
WBC # BLD: 15.05 K/UL — HIGH (ref 3.8–10.5)
WBC # FLD AUTO: 15.05 K/UL — HIGH (ref 3.8–10.5)

## 2021-06-16 PROCEDURE — 71045 X-RAY EXAM CHEST 1 VIEW: CPT | Mod: 26,77

## 2021-06-16 PROCEDURE — 71275 CT ANGIOGRAPHY CHEST: CPT | Mod: 26,MA

## 2021-06-16 PROCEDURE — 99152 MOD SED SAME PHYS/QHP 5/>YRS: CPT

## 2021-06-16 PROCEDURE — 88305 TISSUE EXAM BY PATHOLOGIST: CPT | Mod: 26

## 2021-06-16 PROCEDURE — 88112 CYTOPATH CELL ENHANCE TECH: CPT | Mod: 26

## 2021-06-16 PROCEDURE — 93308 TTE F-UP OR LMTD: CPT | Mod: 26

## 2021-06-16 PROCEDURE — 93970 EXTREMITY STUDY: CPT | Mod: 26

## 2021-06-16 PROCEDURE — 99291 CRITICAL CARE FIRST HOUR: CPT

## 2021-06-16 PROCEDURE — 71045 X-RAY EXAM CHEST 1 VIEW: CPT | Mod: 26

## 2021-06-16 PROCEDURE — 74177 CT ABD & PELVIS W/CONTRAST: CPT | Mod: 26,MA

## 2021-06-16 PROCEDURE — 93010 ELECTROCARDIOGRAM REPORT: CPT

## 2021-06-16 PROCEDURE — 86077 PHYS BLOOD BANK SERV XMATCH: CPT

## 2021-06-16 PROCEDURE — 33016 PERICARDIOCENTESIS W/IMAGING: CPT

## 2021-06-16 RX ORDER — PHENYLEPHRINE HYDROCHLORIDE 10 MG/ML
2 INJECTION INTRAVENOUS
Qty: 40 | Refills: 0 | Status: DISCONTINUED | OUTPATIENT
Start: 2021-06-16 | End: 2021-06-16

## 2021-06-16 RX ORDER — DILTIAZEM HCL 120 MG
10 CAPSULE, EXT RELEASE 24 HR ORAL ONCE
Refills: 0 | Status: COMPLETED | OUTPATIENT
Start: 2021-06-16 | End: 2021-06-16

## 2021-06-16 RX ORDER — SODIUM CHLORIDE 9 MG/ML
1000 INJECTION, SOLUTION INTRAVENOUS ONCE
Refills: 0 | Status: DISCONTINUED | OUTPATIENT
Start: 2021-06-16 | End: 2021-06-16

## 2021-06-16 RX ORDER — FAMOTIDINE 10 MG/ML
20 INJECTION INTRAVENOUS ONCE
Refills: 0 | Status: COMPLETED | OUTPATIENT
Start: 2021-06-16 | End: 2021-06-16

## 2021-06-16 RX ORDER — ATORVASTATIN CALCIUM 80 MG/1
20 TABLET, FILM COATED ORAL AT BEDTIME
Refills: 0 | Status: DISCONTINUED | OUTPATIENT
Start: 2021-06-16 | End: 2021-06-18

## 2021-06-16 RX ORDER — FUROSEMIDE 40 MG
40 TABLET ORAL ONCE
Refills: 0 | Status: COMPLETED | OUTPATIENT
Start: 2021-06-16 | End: 2021-06-16

## 2021-06-16 RX ORDER — HEPARIN SODIUM 5000 [USP'U]/ML
5000 INJECTION INTRAVENOUS; SUBCUTANEOUS EVERY 8 HOURS
Refills: 0 | Status: DISCONTINUED | OUTPATIENT
Start: 2021-06-16 | End: 2021-06-18

## 2021-06-16 RX ORDER — LEVALBUTEROL 1.25 MG/.5ML
1.25 SOLUTION, CONCENTRATE RESPIRATORY (INHALATION) ONCE
Refills: 0 | Status: DISCONTINUED | OUTPATIENT
Start: 2021-06-16 | End: 2021-06-16

## 2021-06-16 RX ORDER — PROPOFOL 10 MG/ML
10 INJECTION, EMULSION INTRAVENOUS
Qty: 1000 | Refills: 0 | Status: DISCONTINUED | OUTPATIENT
Start: 2021-06-16 | End: 2021-06-16

## 2021-06-16 RX ORDER — FENTANYL CITRATE 50 UG/ML
100 INJECTION INTRAVENOUS ONCE
Refills: 0 | Status: DISCONTINUED | OUTPATIENT
Start: 2021-06-16 | End: 2021-06-16

## 2021-06-16 RX ORDER — MORPHINE SULFATE 50 MG/1
2 CAPSULE, EXTENDED RELEASE ORAL ONCE
Refills: 0 | Status: DISCONTINUED | OUTPATIENT
Start: 2021-06-16 | End: 2021-06-16

## 2021-06-16 RX ORDER — METOPROLOL TARTRATE 50 MG
5 TABLET ORAL ONCE
Refills: 0 | Status: COMPLETED | OUTPATIENT
Start: 2021-06-16 | End: 2021-06-16

## 2021-06-16 RX ORDER — FOLIC ACID 0.8 MG
1 TABLET ORAL DAILY
Refills: 0 | Status: DISCONTINUED | OUTPATIENT
Start: 2021-06-16 | End: 2021-06-18

## 2021-06-16 RX ORDER — NICOTINE POLACRILEX 2 MG
1 GUM BUCCAL DAILY
Refills: 0 | Status: DISCONTINUED | OUTPATIENT
Start: 2021-06-16 | End: 2021-06-18

## 2021-06-16 RX ORDER — LACTULOSE 10 G/15ML
20 SOLUTION ORAL ONCE
Refills: 0 | Status: COMPLETED | OUTPATIENT
Start: 2021-06-16 | End: 2021-06-17

## 2021-06-16 RX ORDER — CHLORHEXIDINE GLUCONATE 213 G/1000ML
1 SOLUTION TOPICAL
Refills: 0 | Status: DISCONTINUED | OUTPATIENT
Start: 2021-06-16 | End: 2021-06-17

## 2021-06-16 RX ORDER — CLONAZEPAM 1 MG
0.5 TABLET ORAL ONCE
Refills: 0 | Status: DISCONTINUED | OUTPATIENT
Start: 2021-06-16 | End: 2021-06-16

## 2021-06-16 RX ORDER — CEFAZOLIN SODIUM 1 G
1000 VIAL (EA) INJECTION ONCE
Refills: 0 | Status: COMPLETED | OUTPATIENT
Start: 2021-06-16 | End: 2021-06-16

## 2021-06-16 RX ORDER — DEXMEDETOMIDINE HYDROCHLORIDE IN 0.9% SODIUM CHLORIDE 4 UG/ML
0.5 INJECTION INTRAVENOUS
Qty: 200 | Refills: 0 | Status: DISCONTINUED | OUTPATIENT
Start: 2021-06-16 | End: 2021-06-16

## 2021-06-16 RX ORDER — CEFAZOLIN SODIUM 1 G
VIAL (EA) INJECTION
Refills: 0 | Status: DISCONTINUED | OUTPATIENT
Start: 2021-06-16 | End: 2021-06-17

## 2021-06-16 RX ORDER — ACETAMINOPHEN 500 MG
1000 TABLET ORAL ONCE
Refills: 0 | Status: COMPLETED | OUTPATIENT
Start: 2021-06-16 | End: 2021-06-16

## 2021-06-16 RX ORDER — ROCURONIUM BROMIDE 10 MG/ML
100 VIAL (ML) INTRAVENOUS ONCE
Refills: 0 | Status: COMPLETED | OUTPATIENT
Start: 2021-06-16 | End: 2021-06-16

## 2021-06-16 RX ORDER — SODIUM CHLORIDE 9 MG/ML
1000 INJECTION INTRAMUSCULAR; INTRAVENOUS; SUBCUTANEOUS ONCE
Refills: 0 | Status: COMPLETED | OUTPATIENT
Start: 2021-06-16 | End: 2021-06-16

## 2021-06-16 RX ORDER — SERTRALINE 25 MG/1
25 TABLET, FILM COATED ORAL
Refills: 0 | Status: DISCONTINUED | OUTPATIENT
Start: 2021-06-16 | End: 2021-06-18

## 2021-06-16 RX ORDER — LIDOCAINE 4 G/100G
10 CREAM TOPICAL ONCE
Refills: 0 | Status: COMPLETED | OUTPATIENT
Start: 2021-06-16 | End: 2021-06-16

## 2021-06-16 RX ORDER — FENTANYL CITRATE 50 UG/ML
50 INJECTION INTRAVENOUS ONCE
Refills: 0 | Status: DISCONTINUED | OUTPATIENT
Start: 2021-06-16 | End: 2021-06-16

## 2021-06-16 RX ORDER — FUROSEMIDE 40 MG
40 TABLET ORAL ONCE
Refills: 0 | Status: DISCONTINUED | OUTPATIENT
Start: 2021-06-16 | End: 2021-06-16

## 2021-06-16 RX ORDER — IPRATROPIUM/ALBUTEROL SULFATE 18-103MCG
3 AEROSOL WITH ADAPTER (GRAM) INHALATION ONCE
Refills: 0 | Status: COMPLETED | OUTPATIENT
Start: 2021-06-16 | End: 2021-06-16

## 2021-06-16 RX ORDER — KETOROLAC TROMETHAMINE 30 MG/ML
15 SYRINGE (ML) INJECTION ONCE
Refills: 0 | Status: DISCONTINUED | OUTPATIENT
Start: 2021-06-16 | End: 2021-06-16

## 2021-06-16 RX ORDER — METOPROLOL TARTRATE 50 MG
200 TABLET ORAL DAILY
Refills: 0 | Status: DISCONTINUED | OUTPATIENT
Start: 2021-06-16 | End: 2021-06-16

## 2021-06-16 RX ORDER — CHLORHEXIDINE GLUCONATE 213 G/1000ML
15 SOLUTION TOPICAL EVERY 12 HOURS
Refills: 0 | Status: DISCONTINUED | OUTPATIENT
Start: 2021-06-16 | End: 2021-06-16

## 2021-06-16 RX ORDER — CEFAZOLIN SODIUM 1 G
1000 VIAL (EA) INJECTION EVERY 8 HOURS
Refills: 0 | Status: DISCONTINUED | OUTPATIENT
Start: 2021-06-17 | End: 2021-06-17

## 2021-06-16 RX ORDER — BUDESONIDE, MICRONIZED 100 %
0.5 POWDER (GRAM) MISCELLANEOUS
Refills: 0 | Status: DISCONTINUED | OUTPATIENT
Start: 2021-06-16 | End: 2021-06-18

## 2021-06-16 RX ORDER — THIAMINE MONONITRATE (VIT B1) 100 MG
100 TABLET ORAL DAILY
Refills: 0 | Status: DISCONTINUED | OUTPATIENT
Start: 2021-06-16 | End: 2021-06-18

## 2021-06-16 RX ORDER — MIDAZOLAM HYDROCHLORIDE 1 MG/ML
2 INJECTION, SOLUTION INTRAMUSCULAR; INTRAVENOUS ONCE
Refills: 0 | Status: DISCONTINUED | OUTPATIENT
Start: 2021-06-16 | End: 2021-06-16

## 2021-06-16 RX ORDER — CLONAZEPAM 1 MG
0.5 TABLET ORAL
Refills: 0 | Status: DISCONTINUED | OUTPATIENT
Start: 2021-06-16 | End: 2021-06-18

## 2021-06-16 RX ADMIN — FENTANYL CITRATE 50 MICROGRAM(S): 50 INJECTION INTRAVENOUS at 11:00

## 2021-06-16 RX ADMIN — Medication 3 MILLILITER(S): at 06:24

## 2021-06-16 RX ADMIN — Medication 5 MILLIGRAM(S): at 06:10

## 2021-06-16 RX ADMIN — Medication 10 MILLIGRAM(S): at 03:25

## 2021-06-16 RX ADMIN — FENTANYL CITRATE 50 MICROGRAM(S): 50 INJECTION INTRAVENOUS at 11:22

## 2021-06-16 RX ADMIN — Medication 50 MILLIGRAM(S): at 01:59

## 2021-06-16 RX ADMIN — SODIUM CHLORIDE 1000 MILLILITER(S): 9 INJECTION INTRAMUSCULAR; INTRAVENOUS; SUBCUTANEOUS at 03:07

## 2021-06-16 RX ADMIN — Medication 10 MILLIGRAM(S): at 03:56

## 2021-06-16 RX ADMIN — ATORVASTATIN CALCIUM 20 MILLIGRAM(S): 80 TABLET, FILM COATED ORAL at 21:33

## 2021-06-16 RX ADMIN — MORPHINE SULFATE 2 MILLIGRAM(S): 50 CAPSULE, EXTENDED RELEASE ORAL at 08:40

## 2021-06-16 RX ADMIN — Medication 0.5 MILLIGRAM(S): at 03:56

## 2021-06-16 RX ADMIN — HEPARIN SODIUM 5000 UNIT(S): 5000 INJECTION INTRAVENOUS; SUBCUTANEOUS at 21:33

## 2021-06-16 RX ADMIN — Medication 0.5 MILLIGRAM(S): at 21:03

## 2021-06-16 RX ADMIN — MORPHINE SULFATE 2 MILLIGRAM(S): 50 CAPSULE, EXTENDED RELEASE ORAL at 22:06

## 2021-06-16 RX ADMIN — FAMOTIDINE 20 MILLIGRAM(S): 10 INJECTION INTRAVENOUS at 00:53

## 2021-06-16 RX ADMIN — MORPHINE SULFATE 2 MILLIGRAM(S): 50 CAPSULE, EXTENDED RELEASE ORAL at 14:59

## 2021-06-16 RX ADMIN — Medication 15 MILLIGRAM(S): at 00:53

## 2021-06-16 RX ADMIN — DEXMEDETOMIDINE HYDROCHLORIDE IN 0.9% SODIUM CHLORIDE 13 MICROGRAM(S)/KG/HR: 4 INJECTION INTRAVENOUS at 17:01

## 2021-06-16 RX ADMIN — Medication 100 MILLIGRAM(S): at 20:13

## 2021-06-16 RX ADMIN — Medication 25 MILLIGRAM(S): at 22:06

## 2021-06-16 RX ADMIN — Medication 0.5 MILLIGRAM(S): at 17:00

## 2021-06-16 RX ADMIN — Medication 100 MILLIGRAM(S): at 08:40

## 2021-06-16 RX ADMIN — Medication 100 MILLIGRAM(S): at 22:06

## 2021-06-16 RX ADMIN — Medication 200 MILLIGRAM(S): at 05:18

## 2021-06-16 RX ADMIN — MORPHINE SULFATE 2 MILLIGRAM(S): 50 CAPSULE, EXTENDED RELEASE ORAL at 22:37

## 2021-06-16 RX ADMIN — Medication 10 MILLIGRAM(S): at 05:00

## 2021-06-16 RX ADMIN — FENTANYL CITRATE 100 MICROGRAM(S): 50 INJECTION INTRAVENOUS at 08:30

## 2021-06-16 RX ADMIN — PROPOFOL 6.26 MICROGRAM(S)/KG/MIN: 10 INJECTION, EMULSION INTRAVENOUS at 12:19

## 2021-06-16 RX ADMIN — Medication 40 MILLIGRAM(S): at 12:19

## 2021-06-16 RX ADMIN — Medication 30 MILLILITER(S): at 01:59

## 2021-06-16 RX ADMIN — MORPHINE SULFATE 2 MILLIGRAM(S): 50 CAPSULE, EXTENDED RELEASE ORAL at 14:43

## 2021-06-16 RX ADMIN — Medication 400 MILLIGRAM(S): at 21:00

## 2021-06-16 RX ADMIN — Medication 10 MILLIGRAM(S): at 02:51

## 2021-06-16 RX ADMIN — SERTRALINE 25 MILLIGRAM(S): 25 TABLET, FILM COATED ORAL at 17:00

## 2021-06-16 RX ADMIN — MIDAZOLAM HYDROCHLORIDE 2 MILLIGRAM(S): 1 INJECTION, SOLUTION INTRAMUSCULAR; INTRAVENOUS at 08:05

## 2021-06-16 RX ADMIN — Medication 1000 MILLIGRAM(S): at 21:41

## 2021-06-16 RX ADMIN — Medication 1 MILLIGRAM(S): at 22:06

## 2021-06-16 RX ADMIN — LIDOCAINE 10 MILLILITER(S): 4 CREAM TOPICAL at 01:59

## 2021-06-16 NOTE — CONSULT NOTE ADULT - ASSESSMENT
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 Patient was seen in the ER earlier this month for the same symptoms, but left AMA prior to echo and stress test being performed. Patient states that for the last 3-4 days he has been experiencing progressive shortness of breath, palpitations, and pressure in his chest. Patient was found upon arrival to the ER to be in Afib with RVR, and CTA showed a large circumferential pericardial effusion. STAT echo revealed large pericardial effusion at apex with signs of tamponade physiology. Patient is currently sitting up at 90 degress on NC, still complaining of shortness of breath. Patient's blood pressure is stable, still in AFib with RVR. Patient is too dyspneic to lay at even 30 degrees. Patient is to be intubated and undergo pericardiocentesis. Last dose of Eliquis was 06/15/21 AM.   Troponin negative x 1      Large Pericardial Effusion with Tamponade Physiology  - Noted on CTA and emergent echo.   - Patient's BP is stable, receiving a second L of NS at this time.   - Still in Afib with RVR, will avoid rate control as patient is preload dependent at this time.   - Patient to be intubated due to hypoxia and inability to lay flat for the procedure.   - NPO.   - Plan for urgent pericardiocentesis.   - Continue telemetry monitoring.   - Informed consent obtained.   - Will be managed by the ICU after procedure.     Assessment and recommendations are final when note is signed by the attending.  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 Patient was seen in the ER earlier this month for the same symptoms, but left AMA prior to echo and stress test being performed. Patient states that for the last 3-4 days he has been experiencing progressive shortness of breath, palpitations, and pressure in his chest. Patient was found upon arrival to the ER to be in Afib with RVR, and CTA showed a large circumferential pericardial effusion. STAT echo revealed large pericardial effusion at apex with signs of tamponade physiology. Patient is currently sitting up at 90 degress on NC, still complaining of shortness of breath. Patient's blood pressure is stable, still in AFib with RVR. Patient is too dyspneic to lay at even 30 degrees. Patient is to be intubated and undergo pericardiocentesis. Last dose of Eliquis was 06/15/21 AM.   Troponin negative x 1      Large Pericardial Effusion with Tamponade Physiology  - Noted on CTA and emergent echo.   - Patient's BP is stable, received 1 L or fluids.   - Patient now with pulmonary vascular congestion on CXR. Will cautiously give diuresis if needed.  - Still in Afib with RVR, will avoid rate control as patient is preload dependent at this time.   - Patient to be intubated due to hypoxia and inability to lay flat for the procedure.   - NPO.   - Plan for urgent pericardiocentesis.   - Continue telemetry monitoring.   - Informed consent obtained.   - Will be managed by the ICU after procedure.     Atrial Fibrillation with RVR  - Avoid AV nodals at this time.   - Patient converted to NSR with intubation.  - Eliquis on hold due to pericardial effusion.   - Continue telemetry monitoring.    Assessment and recommendations are final when note is signed by the attending.

## 2021-06-16 NOTE — H&P ADULT - NSHPPHYSICALEXAM_GEN_ALL_CORE
GENERAL: Adult male, sitting in bed, appears uncomfortable/moderate distress  HEENT: NC/AT, pupils 3mm, equal round and reactive, NC in place  CV: TIFFANIE, no murmurs appreciated  RESP: tachypneic with accessory muscle use, b/l wheezing/coarse, diminished at left base  ABD: soft, distended, nontender, normoactive bs  EXT: +1 edema, nontender  SKIN: Warm, no rashes appreciated  NEURO: Alert and oriented x3, interactive, nonfocal

## 2021-06-16 NOTE — ED ADULT NURSE NOTE - NSIMPLEMENTINTERV_GEN_ALL_ED
Implemented All Fall Risk Interventions:  [place] to call system. Call bell, personal items and telephone within reach. Instruct patient to call for assistance. Room bathroom lighting operational. Non-slip footwear when patient is off stretcher. Physically safe environment: no spills, clutter or unnecessary equipment. Stretcher in lowest position, wheels locked, appropriate side rails in place. Provide visual cue, wrist band, yellow gown, etc. Monitor gait and stability. Monitor for mental status changes and reorient to person, place, and time. Review medications for side effects contributing to fall risk. Reinforce activity limits and safety measures with patient and family.

## 2021-06-16 NOTE — ED PROVIDER NOTE - SECONDARY DIAGNOSIS.
Chest pain, unspecified type Anxiety Atrial fibrillation with rapid ventricular response Pleural effusion

## 2021-06-16 NOTE — PROCEDURE NOTE - NSTRACHINTUBMED_RESP_A_CORE
fentaNYL injectable/ketamine injectable/midazolam injectable/propofol injectable/rocuronium injectable/propofol infusion

## 2021-06-16 NOTE — ED PROVIDER NOTE - OBJECTIVE STATEMENT
59y/o M with PMHx of Ulcer (6 mons ago), Afib and CHF; on Eliquis presents to the ED c/o CP radiating to b/l shoulders which began 3 days ago. Additional c/o SOB. He additionally endorses 3 days of "blackish stools". Pt states CP is constant and is worse with movement. No hx of stents. Pt endorses ETOH use, last drink today. Denies vomiting or diarrhea.   Cardiologist: Sonny.

## 2021-06-16 NOTE — CONSULT NOTE ADULT - ATTENDING COMMENTS
61 y/o M with PMH non obstructive CAD on [facility] 2011, HFrEF, AF on eliquis, severe PH, obesity, alcoholism, depression, presented to ED with c/o palpitations, chest pain, and dyspnea. Was found to be in AF with RVR and cardiac tamponade with a large pericardial effusion on arrival. Was intubated and underwent pericardiocentesis with 500cc serosanguinous fluid removed, now with pigtail drain in place.     #Large pericardial effusion with tamponade physiology  S/p drainage with 500cc serosanguinous fluid removed  Pigtail catheter in place  Monitor I's and O's   Fluid sent for gram stain, culture, cell count, LDH, albumin- follow up results  Repeat TTE in AM     #AF with RVR  Patient converted to NSR at time of intubation  AC on hold   Cardiac monitoring    #Acute hypoxic respiratory failure  On mechanical vent support    Thank you for allowing me to participate in the care of this patient  Will continue to follow     Assessment and recommendations are final when note is signed by the attending. 61 y/o M with PMH non obstructive CAD on [facility] 2011, HFrEF with LVEF recovered to 55-60% in 3/2021, AF on eliquis, severe PH, obesity, alcoholism, depression, presented to ED with c/o palpitations, chest pain, and dyspnea. Was found to be in AF with RVR and cardiac tamponade with a large pericardial effusion on arrival. Was intubated and underwent pericardiocentesis with 500cc serosanguinous fluid removed, now with pigtail drain in place.     #Large pericardial effusion with tamponade physiology  S/p drainage with 500cc serosanguinous fluid removed  Pigtail catheter in place  Monitor I's and O's   Fluid sent for gram stain, culture, cell count, LDH, albumin- follow up results  Repeat TTE in AM     #AF with RVR  Patient converted to NSR at time of intubation  AC on hold   Cardiac monitoring    #Acute hypoxic respiratory failure  On mechanical vent support    Thank you for allowing me to participate in the care of this patient  Will continue to follow     Assessment and recommendations are final when note is signed by the attending.

## 2021-06-16 NOTE — H&P ADULT - HISTORY OF PRESENT ILLNESS
59 yo m pmhx CAD, HFpEF, AFib on Eliquis, pHTN, obesity and depression biba from home with complaints of chest pain found to have TIFFANIE.  Patient endorses that he has been experiencing sob x3-4 days that has progressed with associated SOB which is what prompted his presentation this evening.  Patient given cardizem 10mg IVP x4 and Toprol XL PO with mild improvement.     Last dose Eliquis 6/15 am. 59 yo m pmhx CAD, HFpEF, AFib on Eliquis, pHTN, obesity and depression biba from home with complaints of chest pain found to have TIFFANIE.  Patient endorses that he has been experiencing sob x3-4 days that has progressed with associated SOB which is what prompted his presentation this evening.  Patient given cardizem 10mg IVP x4 and Toprol XL PO with mild improvement. Patient sent for CTA chest to rule out PE; no PE found however moderate to large pericardial effusion and small pleural effusions found. Interventional cardiology and CTS consulted, State TTE ordered, tentative plan for pericardiocentesis.    Last dose Eliquis 6/15 am.

## 2021-06-16 NOTE — ED ADULT NURSE REASSESSMENT NOTE - NS ED NURSE REASSESS COMMENT FT1
Pt HR still in the 140's to 150's. Pts rhythm continues to be in afib. Pt sob,  notified.
Pt continues to be in afib in the 140's to 150's. Pt still tachypneic. Dr. Arguello notified. Pt resting on cm.
Pt continues to be in rapid afib. Pts HR between 150-160. Pt resp are tachypneic, Dr. Arguello notified. Pt resting on cm. Will monitor.
pt in critical care, alert, resp labored, tachypneic, ICU at bedside, respiratory therapist at bedside. Plan of care explained to patient. will continue to monitor
pt in rapid afib in the 160's-170's.  notified and at bedside. Pt sob and states that his "heart is beating out of his chest." EKG to follow
received pt anxious, tachycardic 150s in afib, tachypneic. MICU PA and CT surgery at bedside. duo neb and lopressor given as ordered, will continue to montior.
patient appears to be uncomfortable in appearance. tachypnic and  shallow resps. with increased effort. patient has afib on cm. and reports less pain in chest after metoprolol but still present. pt reports having distended abdomen.

## 2021-06-16 NOTE — PROCEDURE NOTE - ESTIMATED BLOOD LOSS
Please advise patient that recent home sleep testing reconfirmed the diagnosis of suresh.  I've placed and order for new apap unit.  Patient should expect a call from dme in 3-5 working days.  Please schedule clinic follow up with md/np in 6-8 weeks.   None

## 2021-06-16 NOTE — H&P ADULT - ASSESSMENT
59 yo m pmhx CAD, HFpEF, AFib on Eliquis, pHTN, obesity and depression admitted with     1. TIFFANIE  2. Pericardial Effusion  3. Hypoxic Respiratory Failure  4. Pleural Effusions b/l    NEURO: Anxiety control with klonopin  CV: TIFFANIE; Moderate Pericardial effusion received 1L IVF in ED, stat TTE ordered, Interventional Cardiology consulted, Dr. Munoz following.   RESP: Hypoxic respiratory failure on 4L NC, spo2 maintaining >92%.   RENAL: Monitor lytes, replace as needed  GI: NPO except meds/ice chips  ENDO: POCT q6hrs while NPO  ID: No active infectious process, leukocytosis likely reactive   HEME: Holding ac/antiplt therapy in anticipation for possible procedure  DISPO: Full code.   61 yo m pmhx CAD, HFpEF, AFib on Eliquis, pHTN, obesity and depression admitted with     1. TIFFANIE  2. Pericardial Effusion  3. Hypoxic Respiratory Failure  4. Pleural Effusions b/l    NEURO: Anxiety control with klonopin  CV: TIFFANIE holding further rate control pending TTE results; Moderate Pericardial effusion received 1L IVF in ED, stat TTE ordered, Interventional Cardiology consulted, Dr. Munoz following. Patient at extremely high risk for decompensation and sudden death.  RESP: Hypoxic respiratory failure on 4L NC, spo2 maintaining >92%.   RENAL: Monitor lytes, replace as needed  GI: NPO except meds/ice chips  ENDO: POCT q6hrs while NPO  ID: No active infectious process, leukocytosis likely reactive   HEME: Holding ac/antiplt therapy in anticipation for possible procedure  DISPO: Full code.      Critical Care time: 60 mins assessing presenting problems of acute illness that poses high probability of life threatening deterioration or end organ damage/dysfunction.  Medical decision making inculding Initiating plan of care, reviewing data, reviewing radiology, discussing with multidisciplinary team, non inclusive of procedures, discussing goals of care with patient/family

## 2021-06-16 NOTE — H&P ADULT - NSICDXPASTMEDICALHX_GEN_ALL_CORE_FT
PAST MEDICAL HISTORY:  Atrial fibrillation     CHF (congestive heart failure)     Depression, unspecified depression type     HTN (hypertension)     Hyperlipidemia     Pulmonary hypertension

## 2021-06-16 NOTE — ED PROVIDER NOTE - PROGRESS NOTE DETAILS
Feels better after meds, continues to request Librium Pt has gone into rapid afib, will medicate and re-eval. Patient still tachycardiac in the 130's. Also complaining of anxiety symptoms over frustration with recurrent afib and concerns regarding it's effect on his life. Chest discomfort has improved significantly. Ct scan noted with effusion. Patient with persistent tachycardia. ICU and CT surg contacted for consultation. Interventional cardiologist contacted and case discussed. Cardiology pa called.

## 2021-06-16 NOTE — ED PROVIDER NOTE - CARE PLAN
Principal Discharge DX:	Paroxysmal atrial fibrillation  Secondary Diagnosis:	Chest pain, unspecified type   Principal Discharge DX:	Pericardial effusion, acute  Secondary Diagnosis:	Atrial fibrillation with rapid ventricular response  Secondary Diagnosis:	Pleural effusion  Secondary Diagnosis:	Anxiety

## 2021-06-16 NOTE — AIRWAY PLACEMENT NOTE ADULT - POST AIRWAY PLACEMENT ASSESSMENT:
breath sounds bilateral/breath sounds equal/positive end tidal CO2 noted/CXR pending/skin color improved

## 2021-06-16 NOTE — ED ADULT NURSE NOTE - OBJECTIVE STATEMENT
pt is alert and oriented. Pt states that he has been having chest pain for 3 days. Pt states that the pain is on the left side of his chest. Pt states that he is sob and pt has bilateral lower ext swelling. Pt states that he is sob and has pain across his chest that radiates to his right and left shoulder. Pt states that 10 days ago he "had an afib attack and passed out for a minute." Pt states that he did hit his head and came to the ER. Pt resp are tachypneic. Pt denies nausea, vomiting and dizziness. pt educated on plan of care, pt able to successfully teach back plan of care to RN, RN will continue to reeducate pt during hospital stay.

## 2021-06-16 NOTE — CONSULT NOTE ADULT - SUBJECTIVE AND OBJECTIVE BOX
[place] [facility]                                                               Office:  [address] [address]                                                              Telephone: [phone]. Fax:[phone]                                                                        CARDIOLOGY CONSULTATION NOTE                                                                                             Consult requested by:  Dr. Varela  Reason for Consultation: Pericardial Effusion   Primary Cardiologist: Dr. Hinson  History obtained by: Patient and medical record   obtained: No    Covid Status: Negative 06/16/21    Chief complaint:    Patient is a 60y old  Male who presents with a chief complaint of SOB/TIFFANIE (16 Jun 2021 06:36)      HPI: 60 up M with a PMHx of mild non-obstructive CAD ([facility] 2011), HFrecEF (EF now 55-60% 03/21), Afib (on Eliquis), severe pulmonary HTN, obesity, alcoholism, and depression who presented to the ER with complaints of palpitations, chest pain, and dyspnea. Patient was seen in the ER earlier this month for the same symptoms, but left AMA prior to echo and stress test being performed. Patient states that for the last 3-4 days he has been experiencing progressive shortness of breath, palpitations, and pressure in his chest. Patient was found upon arrival to the ER to be in Afib with RVR, and CTA showed a large circumferential pericardial effusion. STAT echo revealed large pericardial effusion at apex with signs of tamponade physiology. Patient is currently sitting up at 90 degress on NC, still complaining of shortness of breath. Patient's blood pressure is stable, still in AFib with RVR. Patient is too dyspneic to lay at even 30 degrees. Patient is to be intubated and undergo pericardiocentesis.      Patient states that he has been very stressed recently and has been having a hard time emotionally lately. Patient was discharged from rehab on 05/31/21, and states he has been taking all of his medications. Patient states that this morning he had right sided chest palpitations, and feels his heart rate up into his neck. Patient denies any fevers, chills, CP, SOB, orthopnea, PND, abdominal pain, N/V/D, headache, or dizziness.     59 yo m pmhx CAD, HFpEF, AFib on Eliquis, pHTN, obesity and depression biba from home with complaints of chest pain found to have TIFFANIE.  Patient endorses that he has been experiencing sob x3-4 days that has progressed with associated SOB which is what prompted his presentation this evening.  Patient given cardizem 10mg IVP x4 and Toprol XL PO with mild improvement.     Last dose Eliquis 6/15 am. (16 Jun 2021 06:36)        REVIEW OF SYMPTOMS:     CONSTITUTIONAL: No fever, weight loss, or fatigue  ENMT:  No difficulty hearing, tinnitus, vertigo; No sinus or throat pain  NECK: No pain or stiffness  CARDIOVASCULAR: chest pain, dyspnea, syncope, palpitations, dizziness, Orthopnea, Paroxsymal nocturnal dyspnea  RESPIRATORY: Dyspnea on exertion, Shortness of breath, cough, wheezing  : No dysuria, no hematuria   GI: No dark color stool, no melena, no diarrhea, no constipation, no abdominal pain   NEURO: No headache, no dizziness, no slurred speech   MUSCULOSKELETAL: No joint pain or swelling; No muscle, back, or extremity pain  PSYCH: No agitation, no anxiety.    ALL OTHER REVIEW OF SYSTEMS ARE NEGATIVE.      PREVIOUS DIAGNOSTIC TESTING  ECHO FINDINGS:  < from: TTE Echo Complete w/ Contrast w/ Doppler (06.16.21 @ 07:22) >  EXAM:  ECHO TTE WITH CON COMP W DOPP      PROCEDURE DATE:  Jun 16 2021   .      INTERPRETATION:  TRANSTHORACIC ECHOCARDIOGRAM REPORT        Patient Name:   LEONID PRICE Patient Location: [facility] Rec #:  [id number]        Accession #:  [id number]  Account #:                      Height:           67.0 in 170.2 cm  YOB: 1960       Weight:           229.9 lb 104.30 kg  Patient Age:    60 years        BSA:              2.15 m²  Patient Gender: M               BP:        104/70 mmHg      Date of Exam:        6/16/2021 7:22:40 AM  Sonographer:         Zeynep Schilling  Referring Physician: TULIO FOX [id number]    Procedure:     Follow up or Limited Echocardiogram.  Indications:   Pericardial effusion (noninflammatory) - I31.3  Diagnosis:     Pericardial effusion (noninflammatory) - I31.3  Study Details: Technically good study.    SPECTRAL DOPPLER ANALYSIS (where applicable):    PHYSICIAN INTERPRETATION:  Pericardium: A large pericardial effusion is present. The pericardial effusion is globally located around the entire heart. There is inversion of the right ventricular wall.  Venous: The inferior vena cava was dilated, with respiratory size variation less than 50%, consistent with elevated right atrial pressure.      Summary:   1. STAT echo done bedside. R/O pericardial effusion.   2. Technically good study.   3. Large pericardial effusion(2.2 cm at apex). Best approached apically for drainage. There is tamponade physiology present.    MD Kishore Electronically signed on 6/16/2021 at 8:12:40 AM        *** Final ***        < end of copied text >          STRESS FINDINGS:      CATHETERIZATION FINDINGS:         ALLERGIES: Allergies    No Known Allergies    Intolerances          PAST MEDICAL HISTORY  HTN (hypertension)    CHF (congestive heart failure)    Atrial fibrillation    Depression, unspecified depression type    Pulmonary hypertension    Hyperlipidemia        PAST SURGICAL HISTORY  No significant past surgical history        FAMILY HISTORY:      SOCIAL HISTORY:  Denies smoking/alcohol/drugs  CIGARETTES:     ALCOHOL:  DRUGS:      CURRENT MEDICATIONS:    buDESOnide    Inhalation Suspension  levalbuterol Inhalation   morphine  - Injectable  chlorhexidine 4% Liquid  lactated ringers Bolus  nicotine - 21 mG/24Hr(s) Patch        HOME MEDICATIONS:      Vital Signs Last 24 Hrs  T(C): 36.7 (16 Jun 2021 07:15), Max: 37 (15 Parmjit 2021 22:31)  T(F): 98 (16 Jun 2021 07:15), Max: 98.6 (15 Parmjit 2021 22:31)  HR: 140 (16 Jun 2021 07:15) (108 - 157)  BP: 136/74 (16 Jun 2021 07:15) (89/41 - 137/65)  BP(mean): --  RR: 35 (16 Jun 2021 07:15) (22 - 38)  SpO2: 96% (16 Jun 2021 07:15) (95% - 98%)      PHYSICAL EXAM:  Constitutional: Comfortable . No acute distress.   HEENT: Atraumatic and normocephalic , neck is supple . no JVD. No carotid bruit. PEERL   CNS: A&Ox3. No focal deficits. EOMI. Cranial nerves II-IX are intact.   Lymph Nodes: Cervical : Not palpable.  Respiratory: CTAB  Cardiovascular: S1S2 RRR. No murmur/rubs or gallop.  Gastrointestinal: Soft non-tender and non distended . +Bowel sounds. negative Nascimento's sign.  Extremities: No edema.   Psychiatric: Calm . no agitation.  Skin: No skin rash/ulcers visualized to face, hands or feet.    Intake and output:   06-15 @ 07:01  -  06-16 @ 07:00  --------------------------------------------------------  IN: 1000 mL / OUT: 100 mL / NET: 900 mL        LABS:                        12.3   15.05 )-----------( 427      ( 16 Jun 2021 00:55 )             36.7     06-16    136  |  101  |  21.1<H>  ----------------------------<  136<H>  4.3   |  19.0<L>  |  1.27    Ca    8.9      16 Jun 2021 00:55    TPro  6.8  /  Alb  3.8  /  TBili  0.3<L>  /  DBili  x   /  AST  29  /  ALT  30  /  AlkPhos  73  06-16    CARDIAC MARKERS ( 16 Jun 2021 00:55 )  x     / <0.01 ng/mL / x     / x     / x        ;p-BNP=Serum Pro-Brain Natriuretic Peptide: 540 pg/mL (06-16 @ 00:55)    PT/INR - ( 16 Jun 2021 06:43 )   PT: 18.7 sec;   INR: 1.65 ratio         PTT - ( 16 Jun 2021 06:43 )  PTT:33.4 sec      INTERPRETATION OF TELEMETRY: Reviewed by me.   ECG: Reviewed by me.     RADIOLOGY & ADDITIONAL STUDIES:    X-ray:  reviewed by me.   CT scan:   MRI:                                                                          [place] [facility]                                                               Office:  [address] [address]                                                              Telephone: [phone]. Fax:[phone]                                                                        CARDIOLOGY CONSULTATION NOTE                                                                                             Consult requested by:  Dr. Varela  Reason for Consultation: Pericardial Effusion   Primary Cardiologist: Dr. Hinson  History obtained by: Patient and medical record   obtained: No    Covid Status: Negative 06/16/21    Chief complaint:    Patient is a 60y old  Male who presents with a chief complaint of SOB/TIFFANIE (16 Jun 2021 06:36)      HPI: 61 y/o M active smoker with a PMHx of mild non-obstructive CAD ([facility] 2011), HFrecEF (EF now 55-60% 03/21), Afib (on Eliquis), severe pulmonary HTN, obesity, alcoholism, and depression who presented to the ER with complaints of palpitations, chest pain, and dyspnea. Patient was seen in the ER earlier this month for the same symptoms, but left AMA prior to echo and stress test being performed. Patient states that for the last 3-4 days he has been experiencing progressive shortness of breath, palpitations, and pressure in his chest. Patient was found upon arrival to the ER to be in Afib with RVR, and CTA showed a large circumferential pericardial effusion. STAT echo revealed large pericardial effusion at apex with signs of tamponade physiology. Patient is currently sitting up at 90 degress on NC, still complaining of shortness of breath. Patient's blood pressure is stable, still in AFib with RVR. Patient is too dyspneic to lay at even 30 degrees. Patient is to be intubated and undergo pericardiocentesis. Last dose of Eliquis was 06/15/21 AM.     REVIEW OF SYMPTOMS:     CONSTITUTIONAL: No fever, weight loss, or fatigue  ENMT:  No difficulty hearing, tinnitus, vertigo; No sinus or throat pain  NECK: No pain or stiffness  CARDIOVASCULAR: AS PER HPI  RESPIRATORY: AS PER HPI  : No dysuria, no hematuria   GI: No dark color stool, no melena, no diarrhea, no constipation, no abdominal pain   NEURO: No headache, no dizziness, no slurred speech   MUSCULOSKELETAL: No joint pain or swelling; No muscle, back, or extremity pain  PSYCH: No agitation, no anxiety.    ALL OTHER REVIEW OF SYSTEMS ARE NEGATIVE.    PREVIOUS DIAGNOSTIC TESTING  ECHO FINDINGS:  < from: TTE Echo Complete w/ Contrast w/ Doppler (06.16.21 @ 07:22) >  EXAM:  ECHO TTE WITH CON COMP W DOPP      PROCEDURE DATE:  Jun 16 2021   .      INTERPRETATION:  TRANSTHORACIC ECHOCARDIOGRAM REPORT        Patient Name:   LEONID PRICE Patient Location: [facility] Rec #:  [id number]        Accession #:  [id number]  Account #:                      Height:           67.0 in 170.2 cm  YOB: 1960       Weight:           229.9 lb 104.30 kg  Patient Age:    60 years        BSA:              2.15 m²  Patient Gender: M               BP:        104/70 mmHg      Date of Exam:        6/16/2021 7:22:40 AM  Sonographer:         Zeynep Schilling  Referring Physician: TULIO FOX [id number]    Procedure:     Follow up or Limited Echocardiogram.  Indications:   Pericardial effusion (noninflammatory) - I31.3  Diagnosis:     Pericardial effusion (noninflammatory) - I31.3  Study Details: Technically good study.    SPECTRAL DOPPLER ANALYSIS (where applicable):    PHYSICIAN INTERPRETATION:  Pericardium: A large pericardial effusion is present. The pericardial effusion is globally located around the entire heart. There is inversion of the right ventricular wall.  Venous: The inferior vena cava was dilated, with respiratory size variation less than 50%, consistent with elevated right atrial pressure.      Summary:   1. STAT echo done bedside. R/O pericardial effusion.   2. Technically good study.   3. Large pericardial effusion(2.2 cm at apex). Best approached apically for drainage. There is tamponade physiology present.    MD Kishore Electronically signed on 6/16/2021 at 8:12:40 AM        *** Final ***        < end of copied text >          ALLERGIES: Allergies    No Known Allergies    Intolerances        PAST MEDICAL HISTORY  HTN (hypertension)    CHF (congestive heart failure)    Atrial fibrillation    Depression, unspecified depression type    Pulmonary hypertension    Hyperlipidemia        PAST SURGICAL HISTORY  No significant past surgical history        FAMILY HISTORY:      Social History:  Smokes 2 PPD  Etoh 1 pint of vodka day  Next of kin is his sister Latrice Hussein  at       CURRENT MEDICATIONS:    buDESOnide    Inhalation Suspension  levalbuterol Inhalation   morphine  - Injectable  chlorhexidine 4% Liquid  lactated ringers Bolus  nicotine - 21 mG/24Hr(s) Patch        HOME MEDICATIONS:  Home Medications:  Afrin 0.05% nasal spray: 2 spray(s) nasal once a day (at bedtime) (08 Jun 2021 16:57)  amLODIPine 10 mg oral tablet: 1 tab(s) orally once a day (08 Jun 2021 16:57)  atorvastatin 20 mg oral tablet: 1 tab(s) orally once a day (08 Jun 2021 16:57)  clonazePAM 0.5 mg oral tablet: 1 tab(s) orally 2 times a day (08 Jun 2021 16:57)  Entresto 49 mg-51 mg oral tablet: 1 tab(s) orally 2 times a day (08 Jun 2021 16:57)  furosemide 40 mg oral tablet: 1 tab(s) orally once a day (08 Jun 2021 16:57)  hydrALAZINE 25 mg oral tablet: 3 tab (75 mG) orally 3 times a day (08 Jun 2021 16:57)  hydrOXYzine hydrochloride 50 mg oral tablet: 1 tab(s) orally 2 times a day (08 Jun 2021 16:57)  metoprolol succinate 100 mg oral tablet, extended release: 2 tab(s) orally once a day (08 Jun 2021 16:57)  sertraline 25 mg oral tablet: 1 tab(s) orally 2 times a day (08 Jun 2021 16:57)      Vital Signs Last 24 Hrs  T(C): 36.7 (16 Jun 2021 07:15), Max: 37 (15 Parmjit 2021 22:31)  T(F): 98 (16 Jun 2021 07:15), Max: 98.6 (15 Parmjit 2021 22:31)  HR: 140 (16 Jun 2021 07:15) (108 - 157)  BP: 136/74 (16 Jun 2021 07:15) (89/41 - 137/65)  RR: 35 (16 Jun 2021 07:15) (22 - 38)  SpO2: 96% (16 Jun 2021 07:15) (95% - 98%)      PHYSICAL EXAM:  Constitutional: Diaphoretic, Sitting at a 90 degree angle  HEENT: Atraumatic and normocephalic , neck is supple . no JVD. No carotid bruit. PEERL   CNS: A&Ox3. No focal deficits. EOMI. Cranial nerves II-IX are intact.   Lymph Nodes: Cervical : Not palpable.  Respiratory: Diffuse expiratory wheezing  Cardiovascular: Tachycardic, irregularly irregular. Muffled heart sounds, No murmur/rubs or gallop.  Gastrointestinal: Soft non-tender and non distended . +Bowel sounds. negative Nascimento's sign.  Extremities: Trace LE edema.   Psychiatric: Calm . no agitation.  Skin: No skin rash/ulcers visualized to face, hands or feet.    Intake and output:   06-15 @ 07:01  -  06-16 @ 07:00  --------------------------------------------------------  IN: 1000 mL / OUT: 100 mL / NET: 900 mL        LABS:                        12.3   15.05 )-----------( 427      ( 16 Jun 2021 00:55 )             36.7     06-16    136  |  101  |  21.1<H>  ----------------------------<  136<H>  4.3   |  19.0<L>  |  1.27    Ca    8.9      16 Jun 2021 00:55    TPro  6.8  /  Alb  3.8  /  TBili  0.3<L>  /  DBili  x   /  AST  29  /  ALT  30  /  AlkPhos  73  06-16    CARDIAC MARKERS ( 16 Jun 2021 00:55 )  x     / <0.01 ng/mL / x     / x     / x        ;p-BNP=Serum Pro-Brain Natriuretic Peptide: 540 pg/mL (06-16 @ 00:55)    PT/INR - ( 16 Jun 2021 06:43 )   PT: 18.7 sec;   INR: 1.65 ratio         PTT - ( 16 Jun 2021 06:43 )  PTT:33.4 sec      INTERPRETATION OF TELEMETRY: Reviewed by me. Afib with RVR  ECG: Reviewed by me. Afib with RVR, low voltage, NSST/T wave abnormalities    RADIOLOGY & ADDITIONAL STUDIES:    X-ray:  reviewed by me.   CT scan:   < from: CT Angio Chest PE Protocol w/ IV Cont (06.16.21 @ 04:59) >  FINDINGS:  CTPA:  There is good opacification of pulmonary arterial tree. No filling defect is present to suggest acute pulmonary embolus.    The thyroid gland is unremarkable.    There is a small right and small-to-moderate left pleural effusions with associated left greater than right lower lobe compressive atelectasis. Partial compressive atelectasis in the lingula of the left upper lobe. Mild biapical paraseptal emphysema. No evidence of pneumonia. No pneumothorax. The trachea and main bronchi are clear.    Nonspecific mediastinal and hilar lymph nodes. For example there is a 1.7 cm right lower paratracheal lymph node 2 subcentimeter right hilar lymph node and 1.8 cm left suprahilar lymph node.    There is a moderate pericardial effusion. Heart the heart size is normal. The thoracic aorta is normal in caliber.    There areno acute osseous findings in the thorax.    CT abdomen/pelvis:  There is somewhat heterogeneous in parenchymal enhancement without focal lesion. The spleen, pancreas and adrenal glands are unremarkable. There is nonspecific gallbladder wall edema andpericholecystic fluid. Questionable gallstone (5-63). The kidneys enhance symmetrically without hydronephrosis. Cyst in the upper pole left kidney.    There is no bowel obstruction. There is no free air or abdominal collection. There is a trace amount of nonspecific fluid and stranding in the upper abdomen which traces along the anterior left pararenal space into the left paracolic gutter. The appendix is normal. There is no abnormal bowel wall thickening.    The abdominal aorta is normal in caliber. Mesenteric vessels are patent. There are numerous nonspecific upper abdominal and retroperitoneal lymph nodes. Prominent lymph nodes are seen in the retrocaval region measuring up to 4 cm in transaxial dimension and periceliac measuring 3.3 cm. There is no significant adenopathy in the pelvis or groin.    There is circumferential bladder wall thickening. The prostate gland is upper limits normal in size.    There are no acute osseous abnormalities in the abdomen or pelvis.    IMPRESSION:  Chest CT: No pulmonary embolism.  Moderate pericardial effusion. Small right and small-to-moderate left pleural effusions with partial lower lobe compressive atelectasis. Nonspecific mild the prominent mediastinal and hilar lymph nodes.    CT abdomen/pelvis:  No bowel obstruction or wall thickening.  Nonspecific gallbladder wall edema with possible gallstone. Consider further evaluation with right upper quadrant sonogram.  Nonspecific prominent upper abdominal and numerous small retroperitoneal lymphnodes.  Circumferential bladder wall thickening. Please correlate clinically with urinalysis.    < end of copied text >    MRI:

## 2021-06-16 NOTE — PROGRESS NOTE ADULT - SUBJECTIVE AND OBJECTIVE BOX
Pt presents to cath lab 2 for urgent pericardiocentesis  Intubated and sedated  VS:  SR-ST  180/91  O2 sat 98%    Medications given:    Pericardiocentesis performed by Dr Munoz  Pigtail drain sutured in place  500 cc of serosanguinous fluid drained  Pt     Post procedure echo performed and confirmed placement and demonstrated improvement in effusion  VS:  /75  HR 87    Plan:  Pt transferred to cath holding bed 17  Admit to MICU  TTE in am  Maintain drain and I&O

## 2021-06-16 NOTE — PROVIDER CONTACT NOTE (EICU) - ASSESSMENT
109/73, HR 76, pulse ox 90s. Pt endorsing 8-9/10 pain to incision site worse with movement. Review of chart with LFTs WNL.

## 2021-06-17 LAB
A1C WITH ESTIMATED AVERAGE GLUCOSE RESULT: 5.7 % — HIGH (ref 4–5.6)
ALBUMIN SERPL ELPH-MCNC: 3.3 G/DL — SIGNIFICANT CHANGE UP (ref 3.3–5.2)
ALP SERPL-CCNC: 66 U/L — SIGNIFICANT CHANGE UP (ref 40–120)
ALT FLD-CCNC: 75 U/L — HIGH
ANION GAP SERPL CALC-SCNC: 12 MMOL/L — SIGNIFICANT CHANGE UP (ref 5–17)
APPEARANCE UR: CLEAR — SIGNIFICANT CHANGE UP
AST SERPL-CCNC: 69 U/L — HIGH
BACTERIA # UR AUTO: ABNORMAL
BASOPHILS # BLD AUTO: 0.05 K/UL — SIGNIFICANT CHANGE UP (ref 0–0.2)
BASOPHILS NFR BLD AUTO: 0.4 % — SIGNIFICANT CHANGE UP (ref 0–2)
BILIRUB SERPL-MCNC: 0.2 MG/DL — LOW (ref 0.4–2)
BILIRUB UR-MCNC: NEGATIVE — SIGNIFICANT CHANGE UP
BUN SERPL-MCNC: 34.8 MG/DL — HIGH (ref 8–20)
CALCIUM SERPL-MCNC: 8.8 MG/DL — SIGNIFICANT CHANGE UP (ref 8.6–10.2)
CHLORIDE SERPL-SCNC: 106 MMOL/L — SIGNIFICANT CHANGE UP (ref 98–107)
CO2 SERPL-SCNC: 26 MMOL/L — SIGNIFICANT CHANGE UP (ref 22–29)
COLOR SPEC: YELLOW — SIGNIFICANT CHANGE UP
CREAT SERPL-MCNC: 1.58 MG/DL — HIGH (ref 0.5–1.3)
DIFF PNL FLD: NEGATIVE — SIGNIFICANT CHANGE UP
EOSINOPHIL # BLD AUTO: 0.05 K/UL — SIGNIFICANT CHANGE UP (ref 0–0.5)
EOSINOPHIL NFR BLD AUTO: 0.4 % — SIGNIFICANT CHANGE UP (ref 0–6)
EPI CELLS # UR: NEGATIVE — SIGNIFICANT CHANGE UP
ESTIMATED AVERAGE GLUCOSE: 117 MG/DL — HIGH (ref 68–114)
GLUCOSE SERPL-MCNC: 86 MG/DL — SIGNIFICANT CHANGE UP (ref 70–99)
GLUCOSE UR QL: NEGATIVE MG/DL — SIGNIFICANT CHANGE UP
HCT VFR BLD CALC: 39.1 % — SIGNIFICANT CHANGE UP (ref 39–50)
HCV AB S/CO SERPL IA: 0.16 S/CO — SIGNIFICANT CHANGE UP (ref 0–0.99)
HCV AB SERPL-IMP: SIGNIFICANT CHANGE UP
HGB BLD-MCNC: 12.4 G/DL — LOW (ref 13–17)
IMM GRANULOCYTES NFR BLD AUTO: 1.1 % — SIGNIFICANT CHANGE UP (ref 0–1.5)
KETONES UR-MCNC: NEGATIVE — SIGNIFICANT CHANGE UP
LEUKOCYTE ESTERASE UR-ACNC: NEGATIVE — SIGNIFICANT CHANGE UP
LYMPHOCYTES # BLD AUTO: 1.2 K/UL — SIGNIFICANT CHANGE UP (ref 1–3.3)
LYMPHOCYTES # BLD AUTO: 10 % — LOW (ref 13–44)
MAGNESIUM SERPL-MCNC: 2.4 MG/DL — SIGNIFICANT CHANGE UP (ref 1.6–2.6)
MCHC RBC-ENTMCNC: 28.6 PG — SIGNIFICANT CHANGE UP (ref 27–34)
MCHC RBC-ENTMCNC: 31.7 GM/DL — LOW (ref 32–36)
MCV RBC AUTO: 90.1 FL — SIGNIFICANT CHANGE UP (ref 80–100)
MONOCYTES # BLD AUTO: 1.16 K/UL — HIGH (ref 0–0.9)
MONOCYTES NFR BLD AUTO: 9.6 % — SIGNIFICANT CHANGE UP (ref 2–14)
NEUTROPHILS # BLD AUTO: 9.44 K/UL — HIGH (ref 1.8–7.4)
NEUTROPHILS NFR BLD AUTO: 78.5 % — HIGH (ref 43–77)
NITRITE UR-MCNC: NEGATIVE — SIGNIFICANT CHANGE UP
PH UR: 6.5 — SIGNIFICANT CHANGE UP (ref 5–8)
PHOSPHATE SERPL-MCNC: 3.5 MG/DL — SIGNIFICANT CHANGE UP (ref 2.4–4.7)
PLATELET # BLD AUTO: 365 K/UL — SIGNIFICANT CHANGE UP (ref 150–400)
POTASSIUM SERPL-MCNC: 3.6 MMOL/L — SIGNIFICANT CHANGE UP (ref 3.5–5.3)
POTASSIUM SERPL-SCNC: 3.6 MMOL/L — SIGNIFICANT CHANGE UP (ref 3.5–5.3)
PROT SERPL-MCNC: 6.8 G/DL — SIGNIFICANT CHANGE UP (ref 6.6–8.7)
PROT UR-MCNC: NEGATIVE MG/DL — SIGNIFICANT CHANGE UP
RBC # BLD: 4.34 M/UL — SIGNIFICANT CHANGE UP (ref 4.2–5.8)
RBC # FLD: 13.6 % — SIGNIFICANT CHANGE UP (ref 10.3–14.5)
RBC CASTS # UR COMP ASSIST: SIGNIFICANT CHANGE UP /HPF (ref 0–4)
SODIUM SERPL-SCNC: 143 MMOL/L — SIGNIFICANT CHANGE UP (ref 135–145)
SP GR SPEC: 1 — LOW (ref 1.01–1.02)
UROBILINOGEN FLD QL: NEGATIVE MG/DL — SIGNIFICANT CHANGE UP
WBC # BLD: 12.03 K/UL — HIGH (ref 3.8–10.5)
WBC # FLD AUTO: 12.03 K/UL — HIGH (ref 3.8–10.5)
WBC UR QL: SIGNIFICANT CHANGE UP

## 2021-06-17 PROCEDURE — 99233 SBSQ HOSP IP/OBS HIGH 50: CPT

## 2021-06-17 PROCEDURE — 93010 ELECTROCARDIOGRAM REPORT: CPT

## 2021-06-17 PROCEDURE — 93308 TTE F-UP OR LMTD: CPT | Mod: 26

## 2021-06-17 PROCEDURE — 99291 CRITICAL CARE FIRST HOUR: CPT

## 2021-06-17 RX ORDER — IPRATROPIUM/ALBUTEROL SULFATE 18-103MCG
3 AEROSOL WITH ADAPTER (GRAM) INHALATION EVERY 6 HOURS
Refills: 0 | Status: DISCONTINUED | OUTPATIENT
Start: 2021-06-17 | End: 2021-06-18

## 2021-06-17 RX ORDER — IPRATROPIUM/ALBUTEROL SULFATE 18-103MCG
3 AEROSOL WITH ADAPTER (GRAM) INHALATION EVERY 6 HOURS
Refills: 0 | Status: DISCONTINUED | OUTPATIENT
Start: 2021-06-17 | End: 2021-06-17

## 2021-06-17 RX ORDER — FUROSEMIDE 40 MG
40 TABLET ORAL ONCE
Refills: 0 | Status: COMPLETED | OUTPATIENT
Start: 2021-06-17 | End: 2021-06-17

## 2021-06-17 RX ORDER — OXYCODONE HYDROCHLORIDE 5 MG/1
5 TABLET ORAL EVERY 8 HOURS
Refills: 0 | Status: DISCONTINUED | OUTPATIENT
Start: 2021-06-17 | End: 2021-06-18

## 2021-06-17 RX ORDER — MORPHINE SULFATE 50 MG/1
2 CAPSULE, EXTENDED RELEASE ORAL EVERY 6 HOURS
Refills: 0 | Status: DISCONTINUED | OUTPATIENT
Start: 2021-06-17 | End: 2021-06-18

## 2021-06-17 RX ORDER — FUROSEMIDE 40 MG
40 TABLET ORAL DAILY
Refills: 0 | Status: DISCONTINUED | OUTPATIENT
Start: 2021-06-18 | End: 2021-06-18

## 2021-06-17 RX ORDER — PANTOPRAZOLE SODIUM 20 MG/1
40 TABLET, DELAYED RELEASE ORAL
Refills: 0 | Status: DISCONTINUED | OUTPATIENT
Start: 2021-06-17 | End: 2021-06-18

## 2021-06-17 RX ADMIN — Medication 100 MILLIGRAM(S): at 12:17

## 2021-06-17 RX ADMIN — Medication 2 MILLIGRAM(S): at 22:36

## 2021-06-17 RX ADMIN — MORPHINE SULFATE 2 MILLIGRAM(S): 50 CAPSULE, EXTENDED RELEASE ORAL at 08:24

## 2021-06-17 RX ADMIN — Medication 3 MILLILITER(S): at 20:21

## 2021-06-17 RX ADMIN — HEPARIN SODIUM 5000 UNIT(S): 5000 INJECTION INTRAVENOUS; SUBCUTANEOUS at 20:55

## 2021-06-17 RX ADMIN — Medication 50 MILLIGRAM(S): at 17:23

## 2021-06-17 RX ADMIN — Medication 25 MILLIGRAM(S): at 06:40

## 2021-06-17 RX ADMIN — Medication 100 MILLIGRAM(S): at 06:40

## 2021-06-17 RX ADMIN — HEPARIN SODIUM 5000 UNIT(S): 5000 INJECTION INTRAVENOUS; SUBCUTANEOUS at 15:07

## 2021-06-17 RX ADMIN — Medication 1 PATCH: at 12:17

## 2021-06-17 RX ADMIN — HEPARIN SODIUM 5000 UNIT(S): 5000 INJECTION INTRAVENOUS; SUBCUTANEOUS at 06:41

## 2021-06-17 RX ADMIN — OXYCODONE HYDROCHLORIDE 5 MILLIGRAM(S): 5 TABLET ORAL at 12:17

## 2021-06-17 RX ADMIN — MORPHINE SULFATE 2 MILLIGRAM(S): 50 CAPSULE, EXTENDED RELEASE ORAL at 08:54

## 2021-06-17 RX ADMIN — Medication 1 MILLIGRAM(S): at 12:17

## 2021-06-17 RX ADMIN — ATORVASTATIN CALCIUM 20 MILLIGRAM(S): 80 TABLET, FILM COATED ORAL at 20:55

## 2021-06-17 RX ADMIN — Medication 100 MILLIGRAM(S): at 15:09

## 2021-06-17 RX ADMIN — Medication 40 MILLIGRAM(S): at 20:55

## 2021-06-17 RX ADMIN — SERTRALINE 25 MILLIGRAM(S): 25 TABLET, FILM COATED ORAL at 06:40

## 2021-06-17 RX ADMIN — Medication 50 MILLIGRAM(S): at 23:16

## 2021-06-17 RX ADMIN — Medication 0.5 MILLIGRAM(S): at 10:10

## 2021-06-17 RX ADMIN — Medication 0.5 MILLIGRAM(S): at 20:21

## 2021-06-17 RX ADMIN — SERTRALINE 25 MILLIGRAM(S): 25 TABLET, FILM COATED ORAL at 17:23

## 2021-06-17 RX ADMIN — Medication 50 MILLIGRAM(S): at 12:16

## 2021-06-17 RX ADMIN — Medication 0.5 MILLIGRAM(S): at 06:40

## 2021-06-17 RX ADMIN — OXYCODONE HYDROCHLORIDE 5 MILLIGRAM(S): 5 TABLET ORAL at 12:47

## 2021-06-17 RX ADMIN — Medication 0.5 MILLIGRAM(S): at 17:23

## 2021-06-17 RX ADMIN — Medication 1 TABLET(S): at 12:16

## 2021-06-17 NOTE — PROGRESS NOTE ADULT - ASSESSMENT
59 y/o M with PMH non obstructive CAD on [facility] 2011, HFrEF with LVEF recovered to 55-60% in 3/2021, AF on eliquis, severe PH, obesity, alcoholism, depression, presented to ED with c/o palpitations, chest pain, and dyspnea. Was found to be in AF with RVR and cardiac tamponade with a large pericardial effusion on arrival. Was intubated for pericardiocentesis with 500cc serosanguinous fluid removed, s/p pigtail placement and removal.    pericardial tamponade (intubated for procedure)    s/p drainage with pericardiocentesis and pigtail.    repeat echo w/ trace effusion    Afib with RVR:      currently NSR     HOLD eliquis, restart eliquis on dc     heparin drip tomorrow (24hs post drain removal)    cardiology following    shortness of breath:  emphysema?  acute diastolic HF? suspect PARI    will treat with IV lasix and nebs, monitor for effect    outpatient sleep study    etoh abuse: denies heavy use;  drinks due to adjustment d/o from losing parents in 2016    monitor on CIWA,  may not require librium taper.    declines psych eval.    c/w klonopin and zoloft.    CARLI:  JOHN?    trend, renal u/s    ua/ux     59 y/o M with PMH non obstructive CAD on [facility] 2011, HFrEF with LVEF recovered to 55-60% in 3/2021, AF on eliquis, severe PH, obesity, alcoholism, depression, presented to ED with c/o palpitations, chest pain, and dyspnea. Was found to be in AF with RVR and cardiac tamponade with a large pericardial effusion on arrival. Was intubated for pericardiocentesis with 500cc serosanguinous fluid removed, s/p pigtail placement and removal.    pericardial tamponade (intubated for procedure)    s/p drainage with pericardiocentesis and pigtail.    repeat echo w/ trace effusion    Afib with RVR:      currently NSR     HOLD eliquis, restart eliquis on dc     heparin drip tomorrow (24hs post drain removal)    cardiology following    shortness of breath:  emphysema?  acute diastolic HF? suspect PARI    will treat with IV lasix and nebs, monitor for effect    outpatient sleep study    etoh abuse: denies heavy use;  drinks due to adjustment d/o from losing parents in 2016    monitor on CIWA,  may not require librium taper.    etoh cessation discussed     declines psych eval.    c/w klonopin and zoloft.    CARLI:  JOHN?    trend, renal u/s    ua/ux    smoker: nicotine patch     61 y/o M with PMH non obstructive CAD on [facility] 2011, HFrEF with LVEF recovered to 55-60% in 3/2021, AF on eliquis, severe PH, obesity, alcoholism, depression, PUD presented to ED with c/o palpitations, chest pain, and dyspnea. Was found to be in AF with RVR and cardiac tamponade with a large pericardial effusion on arrival. Was intubated for pericardiocentesis with 500cc serosanguinous fluid removed, s/p pigtail placement and removal.    pericardial tamponade (intubated for procedure)    s/p drainage with pericardiocentesis and pigtail.    repeat echo w/ trace effusion    Afib with RVR:      currently NSR     HOLD eliquis, restart eliquis on dc     heparin drip tomorrow (24hs post drain removal)    cardiology following    shortness of breath:  emphysema?  acute diastolic HF? suspect PARI    will treat with IV lasix and nebs, monitor for effect    outpatient sleep study    etoh abuse: denies heavy use;  drinks due to adjustment d/o from losing parents in 2016    monitor on CIWA,  may not require librium taper.    etoh cessation discussed     declines psych eval.    c/w klonopin and zoloft.    CARLI:  JOHN?    trend, renal u/s    ua/ux    PUD: states had EGD with gastric ulcer s/p cautery at [place] 6-8 months ago. no f/u  no ppi     trend h/h    ppi    smoker: nicotine patch

## 2021-06-17 NOTE — PROGRESS NOTE ADULT - SUBJECTIVE AND OBJECTIVE BOX
seen for pericardial tamponade    complaining of shortness of breath. improved chest and abd discomfort.  overall feel better  ros otherwise negative     ICU transfer, seen in ICU    MEDICATIONS  (STANDING):  albuterol/ipratropium for Nebulization 3 milliLiter(s) Nebulizer every 6 hours  atorvastatin 20 milliGRAM(s) Oral at bedtime  buDESOnide    Inhalation Suspension 0.5 milliGRAM(s) Inhalation two times a day  ceFAZolin   IVPB      ceFAZolin   IVPB 1000 milliGRAM(s) IV Intermittent every 8 hours  chlordiazePOXIDE   Oral   chlordiazePOXIDE 50 milliGRAM(s) Oral every 6 hours  chlorhexidine 4% Liquid 1 Application(s) Topical <User Schedule>  clonazePAM  Tablet 0.5 milliGRAM(s) Oral two times a day  folic acid 1 milliGRAM(s) Oral daily  furosemide   Injectable 40 milliGRAM(s) IV Push once  heparin   Injectable 5000 Unit(s) SubCutaneous every 8 hours  multivitamin 1 Tablet(s) Oral daily  nicotine - 21 mG/24Hr(s) Patch 1 patch Transdermal daily  pantoprazole  Injectable 40 milliGRAM(s) IV Push two times a day  sertraline 25 milliGRAM(s) Oral two times a day  thiamine 100 milliGRAM(s) Oral daily    MEDICATIONS  (PRN):  LORazepam   Injectable 2 milliGRAM(s) IV Push every 2 hours PRN Symptom-triggered: 2 point increase in CIWA -Ar score and a total score of 7 or LESS  morphine  - Injectable 2 milliGRAM(s) IV Push every 6 hours PRN Severe Pain (7 - 10)  oxyCODONE    IR 5 milliGRAM(s) Oral every 8 hours PRN Moderate Pain (4 - 6)      Allergies    No Known Allergies    Vital Signs Last 24 Hrs  T(C): 36.9 (17 Jun 2021 15:47), Max: 37.1 (16 Jun 2021 19:14)  T(F): 98.4 (17 Jun 2021 15:47), Max: 98.7 (16 Jun 2021 19:14)  HR: 97 (17 Jun 2021 17:00) (72 - 97)  BP: 135/71 (17 Jun 2021 17:00) (92/58 - 144/88)  BP(mean): 96 (17 Jun 2021 17:00) (67 - 107)  RR: 28 (17 Jun 2021 17:00) (13 - 28)  SpO2: 92% (17 Jun 2021 17:00) (88% - 96%)    PHYSICAL EXAM:    GENERAL: NAD obese  CHEST/LUNG: dec bs at bases no wheeze  HEART: Regular rate and rhythm; S1 S2  ABDOMEN: Soft,  Nondistended; Bowel sounds present, epigastric TTP (dressing c/d/i)  EXTREMITIES:  trace pitting edema   NERVOUS SYSTEM:  Alert & Oriented X3, Motor Strength 5/5 B/L upper and lower extremities  PSYCH: appropriate response. anxious    LABS:                        12.4   12.03 )-----------( 365      ( 17 Jun 2021 05:02 )             39.1     06-17    143  |  106  |  34.8<H>  ----------------------------<  86  3.6   |  26.0  |  1.58<H>    Ca    8.8      17 Jun 2021 05:02  Phos  3.5     06-17  Mg     2.4     06-17    TPro  6.8  /  Alb  3.3  /  TBili  0.2<L>  /  DBili  x   /  AST  69<H>  /  ALT  75<H>  /  AlkPhos  66  06-17    PT/INR - ( 16 Jun 2021 06:43 )   PT: 18.7 sec;   INR: 1.65 ratio         PTT - ( 16 Jun 2021 06:43 )  PTT:33.4 sec      CAPILLARY BLOOD GLUCOSE            RADIOLOGY & ADDITIONAL TESTS:

## 2021-06-17 NOTE — PROGRESS NOTE ADULT - ASSESSMENT
59 yo m pmhx CAD, HFpEF, AFib on Eliquis, pHTN, obesity and depression admitted with     1. TIFFANIE  2. Pericardial Effusion  3. Hypoxic Respiratory Failure  4. Pleural Effusions b/l  5. ETOH withdrawal    NEURO: Anxiety control with klonopin, morphine prn.  ETOH withdrawal started on CIWA with librium.    CV: TIFFANIE --> NSR off rate control meds; Moderate Pericardial effusion s/p pericardiocentesis with drain placement, concerns for possible obstruction.  Tubing flushed, will continue to monitor, serial POCUS exams.  Patient found to have tamponade physiology despite being HD stable on presentation.  Close HD monitoring. Cardiology following.    RESP: Hypoxic respiratory failure on 2L NC, spo2 maintaining >88%. nebs prn  RENAL: Monitor lytes, replace as needed  GI: Dash/TLC diet  ENDO: POCT q6hrs while NPO  ID: Cefazolin for coverage while pericardial drain in place.   HEME: Heparin for vte ppx  DISPO: Full code.

## 2021-06-17 NOTE — PROGRESS NOTE ADULT - SUBJECTIVE AND OBJECTIVE BOX
Patient is a 60y old  Male who presents with a chief complaint of SOB/TIFFANIE (16 Jun 2021 10:02)      BRIEF HOSPITAL COURSE: ***    Events last 24 hours: ***    PAST MEDICAL & SURGICAL HISTORY:  HTN (hypertension)    CHF (congestive heart failure)    Atrial fibrillation    Depression, unspecified depression type    Pulmonary hypertension    Hyperlipidemia    No significant past surgical history      Allergies    No Known Allergies    Intolerances      FAMILY HISTORY:      Social History:   ETOH abuse, 4- 16oz beers daily.     Review of Systems:  +SOB, chest discomfort    Physical Examination:    General: Adult male, lying in bed, anxious, diaphoretic     HEENT: NC/AT, NC in place    PULM: Symmetrical thorax expansion upon respiration.  Clear to auscultation bilaterally, no significant sputum production    CVS: Regular rate and rhythm, no murmurs, rubs, or gallops appreciated pericardial drain in place, several clots appreciated throughout.    ABD: Soft, mildly distended, nontender, normoactive bowel sounds, no masses appreciated     EXT: No edema, nontender    SKIN: Warm and well perfused, no rashes noted.    NEURO: Alert, oriented, interactive, diaphoretic, tremulous      Medications:  ceFAZolin   IVPB      ceFAZolin   IVPB 1000 milliGRAM(s) IV Intermittent every 8 hours  buDESOnide    Inhalation Suspension 0.5 milliGRAM(s) Inhalation two times a day  chlordiazePOXIDE 25 milliGRAM(s) Oral every 6 hours  clonazePAM  Tablet 0.5 milliGRAM(s) Oral two times a day  sertraline 25 milliGRAM(s) Oral two times a day  heparin   Injectable 5000 Unit(s) SubCutaneous every 8 hours  lactulose Syrup 20 Gram(s) Oral once  atorvastatin 20 milliGRAM(s) Oral at bedtime  folic acid 1 milliGRAM(s) Oral daily  thiamine 100 milliGRAM(s) Oral daily  chlorhexidine 4% Liquid 1 Application(s) Topical <User Schedule>  nicotine - 21 mG/24Hr(s) Patch 1 patch Transdermal daily      Mode: AC/ CMV (Assist Control/ Continuous Mandatory Ventilation)  RR (machine): 22  TV (machine): 400  FiO2: 100  PEEP: 5  ITime: 1  MAP: 13  PIP: 30      ICU Vital Signs Last 24 Hrs  T(C): 36.5 (16 Jun 2021 23:20), Max: 38.2 (16 Jun 2021 08:57)  T(F): 97.7 (16 Jun 2021 23:20), Max: 100.8 (16 Jun 2021 08:57)  HR: 80 (17 Jun 2021 01:00) (67 - 157)  BP: 109/74 (17 Jun 2021 01:00) (89/41 - 138/85)  BP(mean): 85 (17 Jun 2021 01:00) (80 - 104)  ABP: --  ABP(mean): --  RR: 14 (17 Jun 2021 01:00) (13 - 47)  SpO2: 92% (17 Jun 2021 01:00) (86% - 100%)    Vital Signs Last 24 Hrs  T(C): 36.5 (16 Jun 2021 23:20), Max: 38.2 (16 Jun 2021 08:57)  T(F): 97.7 (16 Jun 2021 23:20), Max: 100.8 (16 Jun 2021 08:57)  HR: 80 (17 Jun 2021 01:00) (67 - 157)  BP: 109/74 (17 Jun 2021 01:00) (89/41 - 138/85)  BP(mean): 85 (17 Jun 2021 01:00) (80 - 104)  RR: 14 (17 Jun 2021 01:00) (13 - 47)  SpO2: 92% (17 Jun 2021 01:00) (86% - 100%)      I&O's Detail    15 Parmjit 2021 07:01  -  16 Jun 2021 07:00  --------------------------------------------------------  IN:    Sodium Chloride 0.9% Bolus: 1000 mL  Total IN: 1000 mL    OUT:    Voided (mL): 100 mL  Total OUT: 100 mL  Total NET: 900 mL      16 Jun 2021 07:01  -  17 Jun 2021 01:04  --------------------------------------------------------  IN:    Dexmedetomidine: 15 mL    IV PiggyBack: 150 mL    Oral Fluid: 610 mL    Propofol: 105.1 mL  Total IN: 880.1 mL    OUT:    Dexmedetomidine: 0 mL    Drain (mL): 700 mL    Indwelling Catheter - Urethral (mL): 1855 mL    Phenylephrine: 0 mL    Voided (mL): 1325 mL  Total OUT: 3880 mL  Total NET: -2999.9 mL      LABS:                        12.3   15.05 )-----------( 427      ( 16 Jun 2021 00:55 )             36.7     06-16    136  |  101  |  21.1<H>  ----------------------------<  136<H>  4.3   |  19.0<L>  |  1.27    Ca    8.9      16 Jun 2021 00:55    TPro  6.8  /  Alb  3.8  /  TBili  0.3<L>  /  DBili  x   /  AST  29  /  ALT  30  /  AlkPhos  73  06-16      CARDIAC MARKERS ( 16 Jun 2021 00:55 )  x     / <0.01 ng/mL / x     / x     / x          PT/INR - ( 16 Jun 2021 06:43 )   PT: 18.7 sec;   INR: 1.65 ratio    PTT - ( 16 Jun 2021 06:43 )  PTT:33.4 sec      CULTURES:  Pending    RADIOLOGY:   < from: US Duplex Venous Lower Ext Complete, Bilateral (06.16.21 @ 22:34) >   EXAM:  US DPLX LWR EXT VEINS COMPL BI                          PROCEDURE DATE:  06/16/2021    INTERPRETATION:  CLINICAL INFORMATION: Leg swelling. Pericardial effusion.    COMPARISON: Left lower extremity venous duplex from 3/12/2021    TECHNIQUE: Portable duplex sonography of the BILATERAL LOWER extremity veins with color and spectral Doppler, with and without compression.    FINDINGS:    RIGHT:  Normal compressibility of the RIGHT common femoral, femoral and popliteal veins.  Doppler examination shows normal spontaneous and phasic flow.  No RIGHT calf vein thrombosis is detected.    LEFT:  Normal compressibility of the LEFT common femoral, femoral and popliteal veins.  Doppler examination shows normal spontaneous and phasic flow.  No LEFT calf vein thrombosis is detected.    IMPRESSION:  No evidence of deep venous thrombosis in either lower extremity.    EMMA JOHNSON MD; Attending Radiologist  This document has been electronically signed. Jun 17 2021 12:00AM    < end of copied text >      SUPPLEMENTAL O2: NC  LINES: Peripheral   IVF: N  COPE: N  PPx: PPI, Heparin  CONTACT: N Patient is a 60y old  Male who presents with a chief complaint of SOB/TIFFANIE (16 Jun 2021 10:02)      BRIEF HOSPITAL COURSE:   59 yo m pmhx CAD, HFpEF, AFib on Eliquis, pHTN, obesity and depression biba from home with complaints of chest pain found to have TIFFANIE.  Patient endorses that he has been experiencing sob x3-4 days that has progressed with associated SOB which is what prompted his presentation patient found to have moderate pericardial effusion and b/l small pleural effusions.      6/16: TTE with tamponade physiology despite being grossly HD stable, intubated for procedure, patient converted to NSR during intubation; had pericardiocentesis with drain placement 500cc fluid initially drained; additional 200 cc prior to 1900.    Events last 24 hours:   This evening called by RN due to patient complaining of chest pain, sob and generalized discomfort.  Patient diaphoretic, tachypneic.  Patient given low dose morphine, started on CIWA with Librium taper.  POCUS performed, pericardial effusion appreciated, drain with serosanguinous fluid and blood clots throughout.  Several attempts to milk tubing and pass clot with some improvement however minimal draining after manipulation.  Site prepped with chlorhexidine, sterile drapes, and sterile gloves worn, Drain flushed with 8cc of sterile saline with clearance of clots from drain.  Will continue to monitor.      PAST MEDICAL & SURGICAL HISTORY:  HTN (hypertension)  CHF (congestive heart failure)  Atrial fibrillation  Depression, unspecified depression type  Pulmonary hypertension  Hyperlipidemia  No significant past surgical history      Allergies  No Known Allergies      FAMILY HISTORY:  Unknown    Social History:   ETOH abuse, 4- 16oz beers daily. Active smoker    Review of Systems:  +SOB, chest discomfort    Physical Examination:    General: Adult male, lying in bed, anxious, diaphoretic     HEENT: NC/AT, NC in place    PULM: Symmetrical thorax expansion upon respiration.  Clear to auscultation bilaterally, no significant sputum production    CVS: Regular rate and rhythm, no murmurs, rubs, or gallops appreciated pericardial drain in place, several clots appreciated throughout.    ABD: Soft, mildly distended, nontender, normoactive bowel sounds, no masses appreciated     EXT: No edema, nontender    SKIN: Warm and well perfused, no rashes noted.    NEURO: Alert, oriented, interactive, diaphoretic, tremulous      Medications:  ceFAZolin   IVPB      ceFAZolin   IVPB 1000 milliGRAM(s) IV Intermittent every 8 hours  buDESOnide    Inhalation Suspension 0.5 milliGRAM(s) Inhalation two times a day  chlordiazePOXIDE 25 milliGRAM(s) Oral every 6 hours  clonazePAM  Tablet 0.5 milliGRAM(s) Oral two times a day  sertraline 25 milliGRAM(s) Oral two times a day  heparin   Injectable 5000 Unit(s) SubCutaneous every 8 hours  lactulose Syrup 20 Gram(s) Oral once  atorvastatin 20 milliGRAM(s) Oral at bedtime  folic acid 1 milliGRAM(s) Oral daily  thiamine 100 milliGRAM(s) Oral daily  chlorhexidine 4% Liquid 1 Application(s) Topical <User Schedule>  nicotine - 21 mG/24Hr(s) Patch 1 patch Transdermal daily      Mode: AC/ CMV (Assist Control/ Continuous Mandatory Ventilation)  RR (machine): 22  TV (machine): 400  FiO2: 100  PEEP: 5  ITime: 1  MAP: 13  PIP: 30      ICU Vital Signs Last 24 Hrs  T(C): 36.5 (16 Jun 2021 23:20), Max: 38.2 (16 Jun 2021 08:57)  T(F): 97.7 (16 Jun 2021 23:20), Max: 100.8 (16 Jun 2021 08:57)  HR: 80 (17 Jun 2021 01:00) (67 - 157)  BP: 109/74 (17 Jun 2021 01:00) (89/41 - 138/85)  BP(mean): 85 (17 Jun 2021 01:00) (80 - 104)  ABP: --  ABP(mean): --  RR: 14 (17 Jun 2021 01:00) (13 - 47)  SpO2: 92% (17 Jun 2021 01:00) (86% - 100%)    Vital Signs Last 24 Hrs  T(C): 36.5 (16 Jun 2021 23:20), Max: 38.2 (16 Jun 2021 08:57)  T(F): 97.7 (16 Jun 2021 23:20), Max: 100.8 (16 Jun 2021 08:57)  HR: 80 (17 Jun 2021 01:00) (67 - 157)  BP: 109/74 (17 Jun 2021 01:00) (89/41 - 138/85)  BP(mean): 85 (17 Jun 2021 01:00) (80 - 104)  RR: 14 (17 Jun 2021 01:00) (13 - 47)  SpO2: 92% (17 Jun 2021 01:00) (86% - 100%)      I&O's Detail    15 Parmjit 2021 07:01  -  16 Jun 2021 07:00  --------------------------------------------------------  IN:    Sodium Chloride 0.9% Bolus: 1000 mL  Total IN: 1000 mL    OUT:    Voided (mL): 100 mL  Total OUT: 100 mL  Total NET: 900 mL      16 Jun 2021 07:01  -  17 Jun 2021 01:04  --------------------------------------------------------  IN:    Dexmedetomidine: 15 mL    IV PiggyBack: 150 mL    Oral Fluid: 610 mL    Propofol: 105.1 mL  Total IN: 880.1 mL    OUT:    Dexmedetomidine: 0 mL    Drain (mL): 700 mL    Indwelling Catheter - Urethral (mL): 1855 mL    Phenylephrine: 0 mL    Voided (mL): 1325 mL  Total OUT: 3880 mL  Total NET: -2999.9 mL      LABS:                        12.3   15.05 )-----------( 427      ( 16 Jun 2021 00:55 )             36.7     06-16    136  |  101  |  21.1<H>  ----------------------------<  136<H>  4.3   |  19.0<L>  |  1.27    Ca    8.9      16 Jun 2021 00:55    TPro  6.8  /  Alb  3.8  /  TBili  0.3<L>  /  DBili  x   /  AST  29  /  ALT  30  /  AlkPhos  73  06-16      CARDIAC MARKERS ( 16 Jun 2021 00:55 )  x     / <0.01 ng/mL / x     / x     / x          PT/INR - ( 16 Jun 2021 06:43 )   PT: 18.7 sec;   INR: 1.65 ratio    PTT - ( 16 Jun 2021 06:43 )  PTT:33.4 sec      CULTURES:  Pending    RADIOLOGY:   < from: US Duplex Venous Lower Ext Complete, Bilateral (06.16.21 @ 22:34) >   EXAM:  US DPLX LWR EXT VEINS COMPL BI                          PROCEDURE DATE:  06/16/2021    INTERPRETATION:  CLINICAL INFORMATION: Leg swelling. Pericardial effusion.    COMPARISON: Left lower extremity venous duplex from 3/12/2021    TECHNIQUE: Portable duplex sonography of the BILATERAL LOWER extremity veins with color and spectral Doppler, with and without compression.    FINDINGS:    RIGHT:  Normal compressibility of the RIGHT common femoral, femoral and popliteal veins.  Doppler examination shows normal spontaneous and phasic flow.  No RIGHT calf vein thrombosis is detected.    LEFT:  Normal compressibility of the LEFT common femoral, femoral and popliteal veins.  Doppler examination shows normal spontaneous and phasic flow.  No LEFT calf vein thrombosis is detected.    IMPRESSION:  No evidence of deep venous thrombosis in either lower extremity.    EMMA JOHNSON MD; Attending Radiologist  This document has been electronically signed. Jun 17 2021 12:00AM    < end of copied text >      SUPPLEMENTAL O2: NC  LINES: Peripheral   IVF: N  COPE: N  PPx: PPI, Heparin  CONTACT: N

## 2021-06-17 NOTE — PROGRESS NOTE ADULT - SUBJECTIVE AND OBJECTIVE BOX
SUBJECTIVE:  Cardiology NP F/U note:    Pericardial effusion with tamponade physiology:  Sp: periocardiocentesis: initially drained 500cc  seen in ICU sitting up , aggitated and refusing to wear 02 or pulse ox.  complains of pleuritic chest discomfort and SOB, not tachypneic  denies , palpatations , dizziness     	  MEDICATIONS  (STANDING):  atorvastatin 20 milliGRAM(s) Oral at bedtime  buDESOnide    Inhalation Suspension 0.5 milliGRAM(s) Inhalation two times a day  ceFAZolin   IVPB      ceFAZolin   IVPB 1000 milliGRAM(s) IV Intermittent every 8 hours  chlordiazePOXIDE   Oral   chlordiazePOXIDE 50 milliGRAM(s) Oral every 6 hours  chlorhexidine 4% Liquid 1 Application(s) Topical <User Schedule>  clonazePAM  Tablet 0.5 milliGRAM(s) Oral two times a day  folic acid 1 milliGRAM(s) Oral daily  heparin   Injectable 5000 Unit(s) SubCutaneous every 8 hours  multivitamin 1 Tablet(s) Oral daily  nicotine - 21 mG/24Hr(s) Patch 1 patch Transdermal daily  sertraline 25 milliGRAM(s) Oral two times a day  thiamine 100 milliGRAM(s) Oral daily    MEDICATIONS  (PRN):  albuterol/ipratropium for Nebulization 3 milliLiter(s) Nebulizer every 6 hours PRN sob  morphine  - Injectable 2 milliGRAM(s) IV Push every 6 hours PRN Severe Pain (7 - 10)  oxyCODONE    IR 5 milliGRAM(s) Oral every 8 hours PRN Moderate Pain (4 - 6)      PHYSICAL EXAM:    T(C): 36.6 (21 @ 07:29), Max: 37.1 (21 @ 19:14)  HR: 75 (21 @ 11:00) (67 - 84)  BP: 123/73 (21 @ 11:00) (92/58 - 128/94)  RR: 13 (21 @ 11:00) (13 - 27)  SpO2: 95% (21 @ 09:00) (88% - 100%)  Wt(kg): --    I&O's Summary    2021 07:01  -  2021 07:00  --------------------------------------------------------  IN: 1120.1 mL / OUT: 4180 mL / NET: -3059.9 mL    2021 07:01  -  2021 11:53  --------------------------------------------------------  IN: 360 mL / OUT: 620 mL / NET: -260 mL        Daily     Daily Weight in k.4 (2021 05:00)    Appearance: aggitated at times / anxious	  Cardiovascular: Normal S1 S2, dininished heart tones.  RRR 70's No JVD, No murmurs, No edema pericardial drain in place drained 700cc yesterday total/  approx. 60cc today  Respiratory: Lungs clear to auscultation	  Psychiatry: A&O, anxious   Gastrointestinal:  Soft, Non-tender, + BS	  Skin: warm and dry  Neurologic: Non-focal  Extremities: Normal range of motion,:  Vascular: Peripheral pulses palpable 2+ bilaterally    TELEMETRY: 	RSR 70's no events, no further AF RVR  	  RADIOLOGY:   DIAGNOSTIC TESTING:  [ ] Echocardiogram:  < from: TTE Echo Limited or F/U (21 @ 09:57) >  ummary:   1. Large pericardial effusion seen in pre procedure.   2. Trivial pericardial fluid post procedure.    < from: TTE Echo Complete w/o Contrast w/ Doppler (21 @ 21:12) >  . Moderately enlarged left atrium.   2. There is mild concentric left ventricular hypertrophy.   3. Normal wall motion. Left ventricular ejection fraction, by visual estimation, is 55to 60%. Grade II diastolic dysfunction.   4. Mildly enlarged right atrium.   5. Normal right ventricular size and function.   6. No significant valvular abnormality.   7. There is no evidence of pericardial effusion.    < end of copied text >    [ ]  Catheterization:  < from: Cardiac Cath Lab - Adult (21 @ 09:47) >  DIAGNOSTIC IMPRESSIONS: Via subxiphoid approach, a drain advanced into the  pericardium.  [id number] ccs of sero-sanguinous fluid removed. Drain left in place.  DIAGNOSTIC RECOMMENDATIONS: ICU management.  Plan for TTE in am. Remove drain in am if no residual effusion noted.  INTERVENTIONAL RECOMMENDATIONS: ICU management.                                    12.4   12.03 )-----------( 365      ( 2021 05:02 )             39.1     -    143  |  106  |  34.8<H>  ----------------------------<  86  3.6   |  26.0  |  1.58<H>    Ca    8.8      2021 05:02  Phos  3.5       Mg     2.4         TPro  6.8  /  Alb  3.3  /  TBili  0.2<L>  /  DBili  x   /  AST  69<H>  /  ALT  75<H>  /  AlkPhos  66        ASSESSMENT:  59 y/o male with PMH of alcoholism (one pint vodka per day) obesity Depression, active smoker, Htn, HLD, Mild CAD, Cardiomyopathy/CHF. Severe Pul htn Atrial fib   who presents to ER last evening with complaints of chest pain described as palpitations. States he has been compliant with all his meds except only taking 1/2 of Eliquis due to fear of GI bleeding.     Sp: Periocardial effusion with Tamponade:   Sp periocardiocentesis with 700cc drained/ approc 60cc today, tube was clotted last pm / flushed by PA   Paf/ RVR   converted to RSR , rate is controlled , holding Eliquis secondary to pericardial effusion  Acute on chronic diastolic HF  hemodynamically stable-was extubated and now on N02 at 3lmp   ETOH abuse  Plan:  continue current meds and ICU management / CIWA protocol  check echo results , monitor drainage,   will d/c drain pending above    will discuss resuming Eliquis

## 2021-06-18 VITALS
HEART RATE: 90 BPM | RESPIRATION RATE: 17 BRPM | OXYGEN SATURATION: 95 % | DIASTOLIC BLOOD PRESSURE: 73 MMHG | TEMPERATURE: 98 F | SYSTOLIC BLOOD PRESSURE: 117 MMHG

## 2021-06-18 LAB
ALBUMIN SERPL ELPH-MCNC: 3.3 G/DL — SIGNIFICANT CHANGE UP (ref 3.3–5.2)
ALP SERPL-CCNC: 65 U/L — SIGNIFICANT CHANGE UP (ref 40–120)
ALT FLD-CCNC: 91 U/L — HIGH
ANION GAP SERPL CALC-SCNC: 12 MMOL/L — SIGNIFICANT CHANGE UP (ref 5–17)
APTT BLD: 29.3 SEC — SIGNIFICANT CHANGE UP (ref 27.5–35.5)
AST SERPL-CCNC: 74 U/L — HIGH
BASOPHILS # BLD AUTO: 0.04 K/UL — SIGNIFICANT CHANGE UP (ref 0–0.2)
BASOPHILS NFR BLD AUTO: 0.5 % — SIGNIFICANT CHANGE UP (ref 0–2)
BILIRUB SERPL-MCNC: <0.2 MG/DL — LOW (ref 0.4–2)
BUN SERPL-MCNC: 25.9 MG/DL — HIGH (ref 8–20)
CALCIUM SERPL-MCNC: 8.8 MG/DL — SIGNIFICANT CHANGE UP (ref 8.6–10.2)
CHLORIDE SERPL-SCNC: 104 MMOL/L — SIGNIFICANT CHANGE UP (ref 98–107)
CO2 SERPL-SCNC: 25 MMOL/L — SIGNIFICANT CHANGE UP (ref 22–29)
CREAT SERPL-MCNC: 1.06 MG/DL — SIGNIFICANT CHANGE UP (ref 0.5–1.3)
EOSINOPHIL # BLD AUTO: 0.13 K/UL — SIGNIFICANT CHANGE UP (ref 0–0.5)
EOSINOPHIL NFR BLD AUTO: 1.5 % — SIGNIFICANT CHANGE UP (ref 0–6)
GLUCOSE SERPL-MCNC: 102 MG/DL — HIGH (ref 70–99)
HCT VFR BLD CALC: 38.2 % — LOW (ref 39–50)
HGB BLD-MCNC: 12 G/DL — LOW (ref 13–17)
IMM GRANULOCYTES NFR BLD AUTO: 1.2 % — SIGNIFICANT CHANGE UP (ref 0–1.5)
LYMPHOCYTES # BLD AUTO: 1.24 K/UL — SIGNIFICANT CHANGE UP (ref 1–3.3)
LYMPHOCYTES # BLD AUTO: 14 % — SIGNIFICANT CHANGE UP (ref 13–44)
MAGNESIUM SERPL-MCNC: 2.2 MG/DL — SIGNIFICANT CHANGE UP (ref 1.6–2.6)
MCHC RBC-ENTMCNC: 28.5 PG — SIGNIFICANT CHANGE UP (ref 27–34)
MCHC RBC-ENTMCNC: 31.4 GM/DL — LOW (ref 32–36)
MCV RBC AUTO: 90.7 FL — SIGNIFICANT CHANGE UP (ref 80–100)
MONOCYTES # BLD AUTO: 0.77 K/UL — SIGNIFICANT CHANGE UP (ref 0–0.9)
MONOCYTES NFR BLD AUTO: 8.7 % — SIGNIFICANT CHANGE UP (ref 2–14)
NEUTROPHILS # BLD AUTO: 6.58 K/UL — SIGNIFICANT CHANGE UP (ref 1.8–7.4)
NEUTROPHILS NFR BLD AUTO: 74.1 % — SIGNIFICANT CHANGE UP (ref 43–77)
NON-GYNECOLOGICAL CYTOLOGY STUDY: SIGNIFICANT CHANGE UP
PHOSPHATE SERPL-MCNC: 4.1 MG/DL — SIGNIFICANT CHANGE UP (ref 2.4–4.7)
PLATELET # BLD AUTO: 394 K/UL — SIGNIFICANT CHANGE UP (ref 150–400)
POTASSIUM SERPL-MCNC: 3.7 MMOL/L — SIGNIFICANT CHANGE UP (ref 3.5–5.3)
POTASSIUM SERPL-SCNC: 3.7 MMOL/L — SIGNIFICANT CHANGE UP (ref 3.5–5.3)
PROT SERPL-MCNC: 6.8 G/DL — SIGNIFICANT CHANGE UP (ref 6.6–8.7)
RBC # BLD: 4.21 M/UL — SIGNIFICANT CHANGE UP (ref 4.2–5.8)
RBC # FLD: 13.5 % — SIGNIFICANT CHANGE UP (ref 10.3–14.5)
SODIUM SERPL-SCNC: 141 MMOL/L — SIGNIFICANT CHANGE UP (ref 135–145)
WBC # BLD: 8.87 K/UL — SIGNIFICANT CHANGE UP (ref 3.8–10.5)
WBC # FLD AUTO: 8.87 K/UL — SIGNIFICANT CHANGE UP (ref 3.8–10.5)

## 2021-06-18 PROCEDURE — 99233 SBSQ HOSP IP/OBS HIGH 50: CPT

## 2021-06-18 PROCEDURE — 76770 US EXAM ABDO BACK WALL COMP: CPT | Mod: 26

## 2021-06-18 RX ORDER — HEPARIN SODIUM 5000 [USP'U]/ML
4000 INJECTION INTRAVENOUS; SUBCUTANEOUS EVERY 6 HOURS
Refills: 0 | Status: DISCONTINUED | OUTPATIENT
Start: 2021-06-18 | End: 2021-06-18

## 2021-06-18 RX ORDER — HEPARIN SODIUM 5000 [USP'U]/ML
8500 INJECTION INTRAVENOUS; SUBCUTANEOUS ONCE
Refills: 0 | Status: COMPLETED | OUTPATIENT
Start: 2021-06-18 | End: 2021-06-18

## 2021-06-18 RX ORDER — HEPARIN SODIUM 5000 [USP'U]/ML
8500 INJECTION INTRAVENOUS; SUBCUTANEOUS EVERY 6 HOURS
Refills: 0 | Status: DISCONTINUED | OUTPATIENT
Start: 2021-06-18 | End: 2021-06-18

## 2021-06-18 RX ORDER — CHLORHEXIDINE GLUCONATE 213 G/1000ML
15 SOLUTION TOPICAL
Refills: 0 | Status: DISCONTINUED | OUTPATIENT
Start: 2021-06-18 | End: 2021-06-18

## 2021-06-18 RX ORDER — NICOTINE POLACRILEX 2 MG
2 GUM BUCCAL THREE TIMES A DAY
Refills: 0 | Status: DISCONTINUED | OUTPATIENT
Start: 2021-06-18 | End: 2021-06-18

## 2021-06-18 RX ORDER — HEPARIN SODIUM 5000 [USP'U]/ML
INJECTION INTRAVENOUS; SUBCUTANEOUS
Qty: 25000 | Refills: 0 | Status: DISCONTINUED | OUTPATIENT
Start: 2021-06-18 | End: 2021-06-18

## 2021-06-18 RX ORDER — DIPHENHYDRAMINE HYDROCHLORIDE AND LIDOCAINE HYDROCHLORIDE AND ALUMINUM HYDROXIDE AND MAGNESIUM HYDRO
30 KIT THREE TIMES A DAY
Refills: 0 | Status: DISCONTINUED | OUTPATIENT
Start: 2021-06-18 | End: 2021-06-18

## 2021-06-18 RX ADMIN — Medication 40 MILLIGRAM(S): at 05:40

## 2021-06-18 RX ADMIN — Medication 50 MILLIGRAM(S): at 13:59

## 2021-06-18 RX ADMIN — Medication 0.5 MILLIGRAM(S): at 09:02

## 2021-06-18 RX ADMIN — Medication 3 MILLILITER(S): at 09:02

## 2021-06-18 RX ADMIN — Medication 50 MILLIGRAM(S): at 05:40

## 2021-06-18 RX ADMIN — Medication 0.5 MILLIGRAM(S): at 18:37

## 2021-06-18 RX ADMIN — HEPARIN SODIUM 5000 UNIT(S): 5000 INJECTION INTRAVENOUS; SUBCUTANEOUS at 05:40

## 2021-06-18 RX ADMIN — PANTOPRAZOLE SODIUM 40 MILLIGRAM(S): 20 TABLET, DELAYED RELEASE ORAL at 05:41

## 2021-06-18 RX ADMIN — SERTRALINE 25 MILLIGRAM(S): 25 TABLET, FILM COATED ORAL at 05:40

## 2021-06-18 RX ADMIN — Medication 1 PATCH: at 14:32

## 2021-06-18 RX ADMIN — OXYCODONE HYDROCHLORIDE 5 MILLIGRAM(S): 5 TABLET ORAL at 14:29

## 2021-06-18 RX ADMIN — Medication 0.5 MILLIGRAM(S): at 05:40

## 2021-06-18 RX ADMIN — Medication 3 MILLILITER(S): at 14:41

## 2021-06-18 RX ADMIN — OXYCODONE HYDROCHLORIDE 5 MILLIGRAM(S): 5 TABLET ORAL at 06:24

## 2021-06-18 NOTE — DIETITIAN INITIAL EVALUATION ADULT. - OTHER INFO
Pt with h/o non obstructive CAD on [facility] [place] 2011, HFrEF with LVEF recovered to 55-60% in 3/2021, AF on eliquis, severe PH, obesity, alcoholism, depression, PUD presented to ED with c/o palpitations, chest pain, and dyspnea. Was found to be in AF with RVR and cardiac tamponade with a large pericardial effusion on arrival. Was intubated for pericardiocentesis with 500cc serosanguinous fluid removed, s/p pigtail placement and removal.

## 2021-06-18 NOTE — PROVIDER CONTACT NOTE (OTHER) - SITUATION
patient with multiple requests to sign out MD DEDRA made aware, advised me to contact PA to sign paperwork, PA came to sign paperwork and refusing to do so ,stating she will pass it on to next shift

## 2021-06-18 NOTE — PROGRESS NOTE ADULT - SUBJECTIVE AND OBJECTIVE BOX
seen for tamponade    no acute complaints  still with some SOB and abd discomfort. otherwise better  refusing monitor and contemplating to go ama  (advised to wear monitor given afib and recent drain placement,  d/w pros/cons of ama )  ros negative     MEDICATIONS  (STANDING):  albuterol/ipratropium for Nebulization 3 milliLiter(s) Nebulizer every 6 hours  atorvastatin 20 milliGRAM(s) Oral at bedtime  buDESOnide    Inhalation Suspension 0.5 milliGRAM(s) Inhalation two times a day  chlordiazePOXIDE   Oral   chlordiazePOXIDE 50 milliGRAM(s) Oral every 6 hours  clonazePAM  Tablet 0.5 milliGRAM(s) Oral two times a day  folic acid 1 milliGRAM(s) Oral daily  furosemide   Injectable 40 milliGRAM(s) IV Push daily  heparin   Injectable 5000 Unit(s) SubCutaneous every 8 hours  multivitamin 1 Tablet(s) Oral daily  nicotine - 21 mG/24Hr(s) Patch 1 patch Transdermal daily  pantoprazole  Injectable 40 milliGRAM(s) IV Push two times a day  sertraline 25 milliGRAM(s) Oral two times a day  thiamine 100 milliGRAM(s) Oral daily    MEDICATIONS  (PRN):  LORazepam   Injectable 2 milliGRAM(s) IV Push every 2 hours PRN Symptom-triggered: 2 point increase in CIWA -Ar score and a total score of 7 or LESS  morphine  - Injectable 2 milliGRAM(s) IV Push every 6 hours PRN Severe Pain (7 - 10)  oxyCODONE    IR 5 milliGRAM(s) Oral every 8 hours PRN Moderate Pain (4 - 6)      Allergies    No Known Allergies      Vital Signs Last 24 Hrs  T(C): 37.2 (2021 08:27), Max: 37.2 (2021 08:27)  T(F): 99 (2021 08:27), Max: 99 (2021 08:27)  HR: 82 (2021 14:43) (74 - 97)  BP: 123/86 (2021 14:05) (105/74 - 156/70)  BP(mean): 87 (2021 21:00) (84 - 106)  RR: 17 (2021 08:27) (16 - 28)  SpO2: 93% (2021 14:43) (91% - 96%)    PHYSICAL EXAM:    GENERAL: NAD  CHEST/LUNG: dec bs at bases   HEART: Regular rate and rhythm; S1 S2  ABDOMEN: Soft, Nontender, Nondistended; Bowel sounds present  EXTREMITIES:  trace edema   NERVOUS SYSTEM:  Alert & Oriented X3, Motor Strength 5/5 B/L upper and lower extremities  PSYCH: normal mood, appropriate response.    LABS:                        12.0   8.87  )-----------( 394      ( 2021 07:35 )             38.2     06-18    141  |  104  |  25.9<H>  ----------------------------<  102<H>  3.7   |  25.0  |  1.06    Ca    8.8      2021 07:35  Phos  4.1     -18  Mg     2.2     -18    TPro  6.8  /  Alb  3.3  /  TBili  <0.2<L>  /  DBili  x   /  AST  74<H>  /  ALT  91<H>  /  AlkPhos  65  -18      Urinalysis Basic - ( 2021 21:41 )    Color: Yellow / Appearance: Clear / S.005 / pH: x  Gluc: x / Ketone: Negative  / Bili: Negative / Urobili: Negative mg/dL   Blood: x / Protein: Negative mg/dL / Nitrite: Negative   Leuk Esterase: Negative / RBC: 0-2 /HPF / WBC 0-2   Sq Epi: x / Non Sq Epi: Negative / Bacteria: Occasional        CAPILLARY BLOOD GLUCOSE            RADIOLOGY & ADDITIONAL TESTS:

## 2021-06-18 NOTE — PROGRESS NOTE ADULT - ATTENDING COMMENTS
59 y/o M with PMH non obstructive CAD on [facility] 2011, HFrEF with LVEF recovered to 55-60% in 3/2021, AF on eliquis, severe PH, obesity, alcoholism, depression, presented to ED with c/o palpitations, chest pain, and dyspnea. Was found to be in AF with RVR and cardiac tamponade with a large pericardial effusion on arrival. Was intubated and underwent pericardiocentesis with 500cc serosanguinous fluid removed, now with pigtail drain in place.     #Large pericardial effusion with tamponade physiology  S/p drainage with initial 500cc serosanguinous fluid removed  Repeat TTE revealed trivial effusion; drain was subsequently removed  Patient comfortable today  Will order limited follow up echo after drain removal     #AF with RVR  Patient converted to NSR at time of intubation  Heparin gtt ordered; transition to eliquis on d/c  Cardiac monitoring refused by patient    Thank you for allowing me to participate in the care of this patient  Will continue to follow
61 y/o M with PMH non obstructive CAD on [facility] 2011, HFrEF with LVEF recovered to 55-60% in 3/2021, AF on eliquis, severe PH, obesity, alcoholism, depression, presented to ED with c/o palpitations, chest pain, and dyspnea. Was found to be in AF with RVR and cardiac tamponade with a large pericardial effusion on arrival. Was intubated and underwent pericardiocentesis with 500cc serosanguinous fluid removed, now with pigtail drain in place.     #Large pericardial effusion with tamponade physiology  S/p drainage with 500cc serosanguinous fluid removed  Pigtail catheter was clotted and required flushing overnight; 20cc documented so far today   Repeat TTE reveals trivial effusion  Drain to be removed   Exudative based on Light's criteria  AFB negative, gram stain/culture negative, fungal culture in progress     #AF with RVR  Patient converted to NSR at time of intubation  AC on hold; would start heparin gtt 24 hours after drain removal if patient remains stable, and transition to eliquis on d/c  Cardiac monitoring    #Acute hypoxic respiratory failure  Now on NC     Thank you for allowing me to participate in the care of this patient  Will continue to follow     Discussed with Dr. Varela

## 2021-06-18 NOTE — DIETITIAN INITIAL EVALUATION ADULT. - HEIGHT FOR BMI (INCHES)
----- Message from Pawan Armenta sent at 2019  8:49 AM CDT -----  Contact: Mom - Hallie Church  MRN: [id number]  : 2018  PCP: Darcy Buchanan  Home Phone      [phone]  Work Phone      Not on file.  Mobile          [phone]      MESSAGE: Diarrhea - really bad diaper rash - some discoloration in area of penis -- requesting to speak with nurse    Call Hallie @ [phone]    PCP: Chanel   7

## 2021-06-18 NOTE — PROGRESS NOTE ADULT - SUBJECTIVE AND OBJECTIVE BOX
[facility]                                                        Office: [address] [address]                                                       Telephone: [phone]. Fax:[phone]                                                                             PROGRESS NOTE   Reason for follow up:   Pericarditis                            Overnight: No new events.   Update:   Drain removed yesterday.  Site dry and intact no bleeding, no swelling or any discomfort.    Subjective: "I want to go home, I keep seeing people out there smoking"   Complains of:  Nothing  Review of symptoms: Cardiac:  No chest pain. No dyspnea. No palpitations.  Respiratory: no cough. No dyspnea  Gastrointestinal: No diarrhea. No abdominal pain. No bleeding.     Past medical history: No updates.   Chronic conditions:  PAST MEDICAL & SURGICAL HISTORY:  HTN (hypertension)  CHF (congestive heart failure)  Atrial fibrillation  Depression, unspecified depression type  Pulmonary hypertension  Hyperlipidemia    	Vitals:  T(C): 36.8 (06-18-21 @ 15:45), Max: 37.2 (06-18-21 @ 08:27)  HR: 90 (06-18-21 @ 15:45) (80 - 97)  BP: 117/73 (06-18-21 @ 15:45) (105/74 - 156/70)  RR: 17 (06-18-21 @ 15:45) (16 - 28)  SpO2: 95% (06-18-21 @ 15:45) (91% - 96%)  I&O's Summary  17 Jun 2021 07:01  -  18 Jun 2021 07:00  --------------------------------------------------------  IN: 1180 mL / OUT: 1970 mL / NET: -790 mL  Weight (kg): 104.3 (06-15 @ 22:31)    PHYSICAL EXAM:  Appearance: Comfortable. No acute distress, obese.    HEENT:  Head and neck: Atraumatic. Normocephalic.  Normal oral mucosa, PERRL, Neck is supple. No JVD, No carotid bruit.   Neurologic: A & O x 3, no focal deficits. EOMI , Cranial nerves are intact.  Lymphatic: No cervical lymphadenopathy  Cardiovascular: Normal S1 S2, No murmur, rubs/gallops. No JVD, No edema  Respiratory: Lungs clear to auscultation, diminished bilaterally bases.    Gastrointestinal:  Soft, Non-tender, + BS, obese, distended  Lower Extremities: No edema  Psychiatry: Patient is calm. No agitation. Mood & affect appropriate  Skin: No rashes/ ecchymoses/cyanosis/ulcers visualized on the face, hands or feet.    CURRENT MEDICATIONS:  furosemide   Injectable 40 milliGRAM(s) IV Push daily  albuterol/ipratropium for Nebulization  buDESOnide    Inhalation Suspension  chlordiazePOXIDE  clonazePAM  Tablet  sertraline  pantoprazole  Injectable  atorvastatin  folic acid  heparin   Injectable  heparin  Infusion.  multivitamin  thiamine    LABS:	 	  CARDIAC MARKERS ( 16 Jun 2021 00:55 )  x     / <0.01 ng/mL / x     / x     / x      p-BNP 16 Jun 2021 00:55: 540 pg/m                       12.0   8.87  )-----------( 394      ( 18 Jun 2021 07:35 )             38.2   06-18  141  |  104  |  25.9<H>  ----------------------------<  102<H>  3.7   |  25.0  |  1.06  Ca    8.8      18 Jun 2021 07:35  Phos  4.1     06-18  Mg     2.2     06-18  TPro  6.8  /  Alb  3.3  /  TBili  <0.2<L>  /  DBili  x   /  AST  74<H>  /  ALT  91<H>  /  AlkPhos  65  06-18  proBNP: Serum Pro-Brain Natriuretic Peptide: 540 pg/mL (    TELEMETRY: Reviewed

## 2021-06-18 NOTE — PROGRESS NOTE ADULT - ASSESSMENT
61 y/o M with PMH non obstructive CAD on [facility] 2011, HFrEF with LVEF recovered to 55-60% in 3/2021, AF on eliquis, severe PH, obesity, alcoholism, depression, PUD presented to ED with c/o palpitations, chest pain, and dyspnea. Was found to be in AF with RVR and cardiac tamponade with a large pericardial effusion on arrival. Was intubated for pericardiocentesis with 500cc serosanguinous fluid removed, s/p pigtail placement and removal.    pericardial tamponade (intubated for procedure)    s/p drainage with pericardiocentesis and pigtail.    repeat echo w/ trace effusion    Afib with RVR:      currently NSR     HOLD eliquis, restart eliquis on dc     heparin drip started (24hs post drain removal)    cardiology following    shortness of breath:  emphysema?  acute diastolic HF? suspect PARI    will treat with IV lasix and nebs, monitor for effect    outpatient sleep study    etoh abuse: denies heavy use;  drinks due to adjustment d/o from losing parents in 2016    monitor on CIWA,  may not require librium taper.    etoh cessation discussed     declines psych eval.    c/w klonopin and zoloft.    CARLI:  JOHN?--improved    trend, renal u/s no hydro    ua/ux    PUD: states had EGD with gastric ulcer s/p cautery at [place] 6-8 months ago. no f/u  no ppi     trend h/h    ppi    smoker: nicotine patch      dispo: suspect 24-48 hrs once cleared by cardiology

## 2021-06-18 NOTE — DIETITIAN INITIAL EVALUATION ADULT. - ORAL INTAKE PTA/DIET HISTORY
Pt currently sleeping; unable to interview at this time.  Pt with good po intake at breakfast per tray observation at bedside.   Pt may benefit from nutrition education when more awake/as time permits.  RD to remain available.

## 2021-06-18 NOTE — DIETITIAN INITIAL EVALUATION ADULT. - PERTINENT LABORATORY DATA
06-18 Na141 mmol/L Glu 102 mg/dL<H> K+ 3.7 mmol/L Cr  1.06 mg/dL BUN 25.9 mg/dL<H> Phos 4.1 mg/dL Alb 3.3 g/dL PAB n/a

## 2021-06-18 NOTE — PROGRESS NOTE ADULT - ASSESSMENT
61 y/o M with PMH non obstructive CAD on [facility] 2011, HFrEF with LVEF recovered to 55-60% in 3/2021, AF on eliquis, severe PH, obesity, alcoholism, depression, presented to ED with c/o palpitations, chest pain, and dyspnea. Was found to be in AF with RVR and cardiac tamponade with a large pericardial effusion on arrival. Was intubated and underwent pericardiocentesis with 500cc serosanguinous fluid removed, now with pigtail drain in place    6/18/2021:   Drain removed yesterday.  Site dry and intact no bleeding, no swelling or any discomfort.      #Large pericardial effusion with tamponade physiology  S/p drainage with 500cc serosanguinous fluid removed  Pigtail catheter removed yesterday.  Site:  Dry and intack, no bleeding, no pain, no bruising, no hematoma.     Repeat TTE reveals trivial effusion  AFB negative, gram stain/culture negative, fungal culture in progress     #AF with RVR  Patient converted to NSR at time of intubation  AC restarted -  heparin gtt and transition to eliquis on d/c  Cardiac monitoring removed by patient.      #Acute hypoxic respiratory failure  Resolved - on room air - no distress noted.

## 2021-06-21 LAB
CULTURE RESULTS: SIGNIFICANT CHANGE UP
SPECIMEN SOURCE: SIGNIFICANT CHANGE UP

## 2021-06-21 NOTE — CDI QUERY NOTE - NSCDIOTHERTXTBX_GEN_ALL_CORE_HH
Documentation noted history of HFrEF.    Supporting Documentation:    6/18 Hospitalist note:  59 y/o M with PMH non obstructive CAD on [facility] 2011, HFrEF with LVEF recovered to 55-60% in   shortness of breath:  emphysema?  acute diastolic HF? suspect PARI    will treat with IV lasix and nebs, monitor for effect    outpatient sleep study    Ed provider note:  Heart Failure:  Does this patient have a history of or has been diagnosed with heart failure? yes.     Treatment: Lasix 40mg IVP Q daily    BNP:  Serum Pro-Brain Natriuretic Peptide: 1128    Can you please clarify if the above clinical indicators support a further diagnosis  A) Acute on chronic HFrEF  B) Chronic HFrEF only  C) Other, pls specify  D) Not clinically significant

## 2021-06-22 LAB — ALLERGY+IMMUNOLOGY DIAG STUDY NOTE: SIGNIFICANT CHANGE UP

## 2021-06-22 PROCEDURE — 83690 ASSAY OF LIPASE: CPT

## 2021-06-22 PROCEDURE — 80053 COMPREHEN METABOLIC PANEL: CPT

## 2021-06-22 PROCEDURE — 83880 ASSAY OF NATRIURETIC PEPTIDE: CPT

## 2021-06-22 PROCEDURE — 87075 CULTR BACTERIA EXCEPT BLOOD: CPT

## 2021-06-22 PROCEDURE — 88305 TISSUE EXAM BY PATHOLOGIST: CPT

## 2021-06-22 PROCEDURE — 86905 BLOOD TYPING RBC ANTIGENS: CPT

## 2021-06-22 PROCEDURE — 84484 ASSAY OF TROPONIN QUANT: CPT

## 2021-06-22 PROCEDURE — 86860 RBC ANTIBODY ELUTION: CPT

## 2021-06-22 PROCEDURE — 86870 RBC ANTIBODY IDENTIFICATION: CPT

## 2021-06-22 PROCEDURE — 81001 URINALYSIS AUTO W/SCOPE: CPT

## 2021-06-22 PROCEDURE — 83615 LACTATE (LD) (LDH) ENZYME: CPT

## 2021-06-22 PROCEDURE — 86880 COOMBS TEST DIRECT: CPT

## 2021-06-22 PROCEDURE — 74177 CT ABD & PELVIS W/CONTRAST: CPT

## 2021-06-22 PROCEDURE — C1769: CPT

## 2021-06-22 PROCEDURE — 99285 EMERGENCY DEPT VISIT HI MDM: CPT | Mod: 25

## 2021-06-22 PROCEDURE — 99152 MOD SED SAME PHYS/QHP 5/>YRS: CPT

## 2021-06-22 PROCEDURE — 93970 EXTREMITY STUDY: CPT

## 2021-06-22 PROCEDURE — 88112 CYTOPATH CELL ENHANCE TECH: CPT

## 2021-06-22 PROCEDURE — 76770 US EXAM ABDO BACK WALL COMP: CPT

## 2021-06-22 PROCEDURE — 96375 TX/PRO/DX INJ NEW DRUG ADDON: CPT

## 2021-06-22 PROCEDURE — 80307 DRUG TEST PRSMV CHEM ANLYZR: CPT

## 2021-06-22 PROCEDURE — 86901 BLOOD TYPING SEROLOGIC RH(D): CPT

## 2021-06-22 PROCEDURE — 83036 HEMOGLOBIN GLYCOSYLATED A1C: CPT

## 2021-06-22 PROCEDURE — 85025 COMPLETE CBC W/AUTO DIFF WBC: CPT

## 2021-06-22 PROCEDURE — 87116 MYCOBACTERIA CULTURE: CPT

## 2021-06-22 PROCEDURE — 86803 HEPATITIS C AB TEST: CPT

## 2021-06-22 PROCEDURE — 84157 ASSAY OF PROTEIN OTHER: CPT

## 2021-06-22 PROCEDURE — 87206 SMEAR FLUORESCENT/ACID STAI: CPT

## 2021-06-22 PROCEDURE — 71275 CT ANGIOGRAPHY CHEST: CPT

## 2021-06-22 PROCEDURE — 36415 COLL VENOUS BLD VENIPUNCTURE: CPT

## 2021-06-22 PROCEDURE — 94002 VENT MGMT INPAT INIT DAY: CPT

## 2021-06-22 PROCEDURE — C8929: CPT

## 2021-06-22 PROCEDURE — 71045 X-RAY EXAM CHEST 1 VIEW: CPT

## 2021-06-22 PROCEDURE — 82945 GLUCOSE OTHER FLUID: CPT

## 2021-06-22 PROCEDURE — 33016 PERICARDIOCENTESIS W/IMAGING: CPT

## 2021-06-22 PROCEDURE — 86900 BLOOD TYPING SEROLOGIC ABO: CPT

## 2021-06-22 PROCEDURE — 93308 TTE F-UP OR LMTD: CPT

## 2021-06-22 PROCEDURE — 87070 CULTURE OTHR SPECIMN AEROBIC: CPT

## 2021-06-22 PROCEDURE — 87102 FUNGUS ISOLATION CULTURE: CPT

## 2021-06-22 PROCEDURE — 84100 ASSAY OF PHOSPHORUS: CPT

## 2021-06-22 PROCEDURE — 94640 AIRWAY INHALATION TREATMENT: CPT

## 2021-06-22 PROCEDURE — 87635 SARS-COV-2 COVID-19 AMP PRB: CPT

## 2021-06-22 PROCEDURE — 83735 ASSAY OF MAGNESIUM: CPT

## 2021-06-22 PROCEDURE — 85730 THROMBOPLASTIN TIME PARTIAL: CPT

## 2021-06-22 PROCEDURE — 93005 ELECTROCARDIOGRAM TRACING: CPT

## 2021-06-22 PROCEDURE — 82042 OTHER SOURCE ALBUMIN QUAN EA: CPT

## 2021-06-22 PROCEDURE — 94760 N-INVAS EAR/PLS OXIMETRY 1: CPT

## 2021-06-22 PROCEDURE — 87252 VIRUS INOCULATION TISSUE: CPT

## 2021-06-22 PROCEDURE — 96374 THER/PROPH/DIAG INJ IV PUSH: CPT

## 2021-06-22 PROCEDURE — 86850 RBC ANTIBODY SCREEN: CPT

## 2021-06-22 PROCEDURE — 87205 SMEAR GRAM STAIN: CPT

## 2021-06-22 PROCEDURE — 87015 SPECIMEN INFECT AGNT CONCNTJ: CPT

## 2021-06-22 PROCEDURE — 85610 PROTHROMBIN TIME: CPT

## 2021-06-22 PROCEDURE — 89051 BODY FLUID CELL COUNT: CPT

## 2021-06-25 LAB — VIRUS SPEC CULT: SIGNIFICANT CHANGE UP

## 2021-06-25 NOTE — CHART NOTE - NSCHARTNOTEFT_GEN_A_CORE
Overnight events noted  This morning with minimal lower chest and abd pain with some SOB  Pericardial drain +  Librium taper, MVi, FA, Thiamine with CIWA monitoring; HOme dose Klonopin, Zoloft and Morphine and Oxy for pain mx  Supplemental o2 NC, Duoneb and pulmicort  Diet  Awaiting repeat TTE, interval to decide with Cardio for plan for Pericardial drain, Ancef in the mean time
PA NOTE-MEDICINE    Called by RN due to pt Desat to 85-88 % RA while sleeping  when woken up sat returns to Normal >92%   RN attempted to apply NC O2 to Pt    Pt refuses  Pt is A & O x 4  Understands ramifications of refusing O2   Pt O2 Sat currently 91% RA  Instructed RN to wake up Pt if Sat drops and reoffer O2     Continue to monitor Pt   If Sat decreases < 90 wake Pt up  If O2 sat doesn't increase to Nl when awake call PA
Per Dr Sue pericardial drain to be removed  Chart and labs reviewed.  < from: TTE Echo Limited or F/U (06.17.21 @ 08:05) >    EXAM:  ECHO TRANSTHORACIC;F U OR LTD      PROCEDURE DATE:  Jun 17 2021   .      INTERPRETATION:  TRANSTHORACIC ECHOCARDIOGRAM REPORT        Patient Name:   LEONID PRICE Patient Location: [facility]  Medical Rec #:  [id number]        Accession #:  [id number]  Account #:      [id number]        Height:           66.9 in 170.0 cm  YOB: 1960       Weight:           229.3 lb 104.00 kg  Patient Age:    60 years        BSA:              2.14 m²  Patient Gender: M               BP:        117/81 mmHg      Date of Exam:        6/17/2021 8:05:45 AM  Sonographer:         Tawanna Daniels  Referring Physician: Latrice Torrez    Procedure:   Follow up or Limited Echocardiogram.  Indications: Pericardial effusion (noninflammatory) - I31.3  Diagnosis:   Pericardial effusion (noninflammatory) - I31.3        2D AND M-MODE MEASUREMENTS (normal ranges within parentheses):  Left                 Normal   Aorta/Left            Normal  Ventricle:                    Atrium:  IVSd (2D): 1.26  (0.7-1.1) Left Atrium    5.34  (1.9-4.0)                 cm             (2D):           cm  LVPWd (2D):   1.52  (0.7-1.1) LA Volume      36.5                 cm             Index         ml/m²  LVIDd (2D):   4.92  (3.4-5.7) Right Ventricle:               cm             RVd (2D):        3.12 cm  LVIDs (2D):   3.03            TAPSE:           1.29 cm                 cm  LV FS (2D):   38.4   (>25%)                  %  LV EF (2D):   69 %   (>55%)  Relative Wall 0.62   (<0.42)  Thickness    LV SYSTOLIC FUNCTION BY 2D PLANIMETRY (MOD):  EF-A4C View: 74.2 % EF-A2C View: 53.2 % EF-Biplane: 66 %    LV DIASTOLIC FUNCTION:  MV Peak E: 1.07 m/s e', MV Shannan: 10.75 m/s  MV Peak A: 0.55 m/s E/e' Ratio: 0.10  E/A Ratio: 1.96     Decel Time: 207 msec    SPECTRAL DOPPLER ANALYSIS (where applicable):  Mitral Valve:  MV P1/2 Time: 60.03 msec  MV Area, PHT: 3.66 cm²    Tricuspid Valve and PA/RV Systolic Pressure: TR Max Velocity: 2.81 m/s RA Pressure: 15 mmHg RVSP/PASP: 46.6 mmHg      PHYSICIAN INTERPRETATION:  Left Ventricle: The left ventricular internal cavity size is normal. There is mild concentric left ventricular hypertrophy involving the posterior wall. The LVH involves posterior walls.  Global LV systolic function was normal. Left ventricular ejection fraction, by visual estimation, is 60 to 65%. Normal segmental left ventricular systolic function. There is septal "bounce" consistent with constrictive physiology. Spectral Doppler shows normal pattern of LV diastolic filling.  Right Ventricle: The right ventricular size is normal. RV systolic function is mildly reduced. TV S' 0.1 m/s.  Left Atrium: Moderately enlarged left atrium.  Right Atrium: Normal right atrial size.  Pericardium: Trivial pericardial effusion is present. Pericardial drain is in-situ.  Mitral Valve: The mitral valve is normal in structure. Mitral leaflet mobility is normal. No evidence of mitral stenosis.  Tricuspid Valve: The tricuspid valve is normal in structure. Mild-moderate tricuspid regurgitation is visualized. Estimated pulmonary artery systolic pressure is 46.6 mmHg assuming a right atrial pressure of 15 mmHg, which is consistent with mild pulmonary hypertension.  Venous: A systolic blunting flow pattern is recorded from all four pulmonary veins. The inferior vena cava is 2.1 cm. The inferior vena cava was dilated, with respiratory size variation less than 50%, consistent with elevated right atrial pressure.  In comparison to the previous echocardiogram(s): Prior examinations are available and were reviewed for comparison purposes. Compared to the prior TTE studies from 6/16/21, no significant residual pericardial effusion.      Summary:   1. Left ventricular ejection fraction, by visual estimation, is 60 to 65%.   2. Normal global left ventricular systolic function.   3. There is mild concentric left ventricular hypertrophy.   4. Normal RV size with mildly reduced RV systolic function, estimated PASP least 46 mmHg mildly elevated..   5. Moderately enlarged left atrium.   6. Mild-moderate tricuspid regurgitation.   7. Trivial pericardial effusion (less than 0.5 cm) in limited subcostal view.   8. Compared to the prior TTE studies from 6/16/21, no significant residual pericardial effusion.    < end of copied text >    Pt placed in the supine position. Dressings removed and area prepped. After procedure explained to pt, suture removed. Pt with sm amount serosang drainage in bag. Pigtail catheter removed, intact. Pt tolerated well. DSD applied.    Pt remained hemodynamicallym stable.  Daily DSD change or as needed.
Antibody Interpretation: Anti-c (little c); Cold Autoantibody (CAA)    Patient is a 61 yo male PMHx significant for CAD, HFpEF, AFib on Eliquis, pHTN, obesity and depression BIBA from home with complaints of chest pain found to have TIFFANIE.  Patient endorses that he has been experiencing SOB x3-4 days that has progressed with associated SOB which is what prompted his presentation this evening.  Patient given cardizem 10mg IVP x4 and Toprol XL PO with mild improvement. Patient sent for CTA chest to rule out PE; no PE found however moderate to large pericardial effusion and small pleural effusions found.   Blood sample received on 06/16/21 demonstrates that the patient is blood group A Rh(D) positive. The antibody screen is positive and anti-c (little c) is identified in patient's plasma. Anti-c is a clinically significant antibody that can cause acute and delayed hemolytic transfusion reactions. Crossmatch compatible red blood cells through the AHG phase of testing, negative for the c antigen, must be selected for transfusion. Additionally, the direct antiglobulin test is positive with anti-IgG and anti-C3d AHG. 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 Cold autoantibodies are targeted against “self” antigens on the red cell surface, and react best at temperatures well below body temperature (contrast to warm autoantibodies). Fortunately, they are benign in most people. Occasionally, cold autoantibodies can react in warmer temperatures (i.e., they may have a broad “thermal amplitude“) and can destroy red cells. These antibodies can also interfere with blood typing and compatibility testing, causing delays in and/or prevent finding compatible units for transfusion. Please allow sufficient time for pre-transfusion blood bank workup.
Family (Latrice- Sister) called, updated about medical conditions and All questions answered

## 2021-07-17 LAB
CULTURE RESULTS: SIGNIFICANT CHANGE UP
SPECIMEN SOURCE: SIGNIFICANT CHANGE UP

## 2021-08-07 LAB
CULTURE RESULTS: SIGNIFICANT CHANGE UP
SPECIMEN SOURCE: SIGNIFICANT CHANGE UP

## 2021-12-16 ENCOUNTER — EMERGENCY (EMERGENCY)
Facility: HOSPITAL | Age: 61
LOS: 1 days | Discharge: HOME | End: 2021-12-16
Attending: EMERGENCY MEDICINE
Payer: MEDICARE

## 2021-12-16 VITALS
HEIGHT: 67 IN | HEART RATE: 90 BPM | DIASTOLIC BLOOD PRESSURE: 86 MMHG | RESPIRATION RATE: 18 BRPM | SYSTOLIC BLOOD PRESSURE: 148 MMHG | TEMPERATURE: 98 F | OXYGEN SATURATION: 98 %

## 2021-12-16 VITALS
DIASTOLIC BLOOD PRESSURE: 103 MMHG | SYSTOLIC BLOOD PRESSURE: 151 MMHG | HEART RATE: 108 BPM | RESPIRATION RATE: 18 BRPM | TEMPERATURE: 98 F | HEIGHT: 67 IN | OXYGEN SATURATION: 96 % | WEIGHT: 235.01 LBS

## 2021-12-16 PROCEDURE — 99284 EMERGENCY DEPT VISIT MOD MDM: CPT | Mod: 25

## 2021-12-16 PROCEDURE — 93010 ELECTROCARDIOGRAM REPORT: CPT

## 2021-12-16 PROCEDURE — 99283 EMERGENCY DEPT VISIT LOW MDM: CPT

## 2021-12-16 PROCEDURE — 99285 EMERGENCY DEPT VISIT HI MDM: CPT

## 2021-12-16 NOTE — ED ADULT NURSE NOTE - OBJECTIVE STATEMENT
patient A&Ox3 in no acute distress denied any chest pain or difficulty breathing stated "I feel little pumping in my chest earlier and my roommate told me to come here " denied any palpitation or any other c/o at this time , admitted to drink today patient A&Ox3 in no acute distress denied any chest pain or difficulty breathing stated "I feel little pumping in my chest earlier and my roommate told me to come here " denied any palpitation or any other c/o at this time

## 2021-12-16 NOTE — ED ADULT NURSE NOTE - CAS ELECT INFOMATION PROVIDED
D/C by MD, noted A/O x 3 ambulatory with stable gait, medicaid cab arranged will waiting at the waiting room/DC instructions

## 2021-12-16 NOTE — ED ADULT TRIAGE NOTE - CHIEF COMPLAINT QUOTE
patient just discharged from ambulance, complaining of headache and palpitations states he was angry

## 2021-12-16 NOTE — ED PROVIDER NOTE - PATIENT PORTAL LINK FT
You can access the FollowMyHealth Patient Portal offered by [facility] by registering at the following website: [email or web address]. By joining [employer]’s FollowMyHealth portal, you will also be able to view your health information using other applications (apps) compatible with our system.

## 2021-12-16 NOTE — ED PROVIDER NOTE - PATIENT PORTAL LINK FT
Second request    Next 5 appointments (look out 90 days)    Jul 11, 2019  4:30 PM CDT  [facility] [address] [facility] [address]  [phone]        RT Lily (R)     You can access the FollowMyHealth Patient Portal offered by [facility] by registering at the following website: [email or web address]. By joining [employer]’s FollowMyHealth portal, you will also be able to view your health information using other applications (apps) compatible with our system.

## 2021-12-16 NOTE — ED PROVIDER NOTE - OBJECTIVE STATEMENT
60yo M BIBEMS for intox, PD called for argumenet with roommate. patient has no complaints and wants to leave. no SI/HI. states his roomate is a jerk. wants to go smoke 60yo M BIBEMS for intox, PD called for argumenet with roommate. patient just left ED after arrival with EMS. states he is angry and needs a place to rest. had palpitaitons and mild HA. no trauma.

## 2021-12-16 NOTE — ED PROVIDER NOTE - CLINICAL SUMMARY MEDICAL DECISION MAKING FREE TEXT BOX
clinically sober, wants to leave, will dc patient just seen in ED, wanted to leave, now back because hes angry and wants to rest, his roommate wont let him back in. no trauma. tylenol. reasses

## 2021-12-16 NOTE — ED PROVIDER NOTE - PHYSICAL EXAMINATION
Gen: NAD, AOx3  Head: NCAT  HEENT: EOMI, oral mucosa moist, normal conjunctiva, neck supple  Lung: no respiratory distress  CV:  Normal perfusion  MSK: No edema, no visible deformities  Neuro: No focal neurologic deficits  Skin: No rash   Psych: normal affect

## 2021-12-17 VITALS
TEMPERATURE: 98 F | OXYGEN SATURATION: 98 % | SYSTOLIC BLOOD PRESSURE: 142 MMHG | HEART RATE: 97 BPM | RESPIRATION RATE: 19 BRPM | DIASTOLIC BLOOD PRESSURE: 81 MMHG

## 2021-12-17 PROCEDURE — G0378: CPT

## 2021-12-17 PROCEDURE — 99285 EMERGENCY DEPT VISIT HI MDM: CPT

## 2021-12-17 PROCEDURE — 99218: CPT | Mod: 25

## 2021-12-17 PROCEDURE — 93005 ELECTROCARDIOGRAM TRACING: CPT

## 2021-12-17 RX ORDER — NITROGLYCERIN 6.5 MG
0.4 CAPSULE, EXTENDED RELEASE ORAL ONCE
Refills: 0 | Status: DISCONTINUED | OUTPATIENT
Start: 2021-12-17 | End: 2021-12-17

## 2021-12-17 NOTE — ED CDU PROVIDER INITIAL DAY NOTE - OBJECTIVE STATEMENT
62yo M BIBEMS for intox, PD called for argumenet with roommate. patient just left ED after arrival with EMS. states he is angry and needs a place to rest. had palpitaitons and mild HA. no trauma.

## 2021-12-17 NOTE — ED CDU PROVIDER DISPOSITION NOTE - PATIENT PORTAL LINK FT
You can access the FollowMyHealth Patient Portal offered by [facility] by registering at the following website: [email or web address]. By joining [employer]’s FollowMyHealth portal, you will also be able to view your health information using other applications (apps) compatible with our system.

## 2021-12-17 NOTE — ED CDU PROVIDER DISPOSITION NOTE - CLINICAL COURSE
60yo M BIBEMS for intox, PD called for argument with roommate. patient just left ED after arrival with EMS. states he is angry and needs a place to rest. had palpitaitons and mild HA. no trauma. Patient now awake, walking in the ED. patient wants to go home, waiting for medicaid cab. He has keys to his apartment.

## 2022-01-12 ENCOUNTER — INPATIENT (INPATIENT)
Facility: HOSPITAL | Age: 62
LOS: 4 days | Discharge: HOME | DRG: 897 | End: 2022-01-17
Attending: HOSPITALIST | Admitting: FAMILY MEDICINE
Payer: MEDICARE

## 2022-01-12 VITALS
RESPIRATION RATE: 20 BRPM | SYSTOLIC BLOOD PRESSURE: 189 MMHG | DIASTOLIC BLOOD PRESSURE: 103 MMHG | HEIGHT: 67 IN | HEART RATE: 98 BPM | WEIGHT: 229.94 LBS | OXYGEN SATURATION: 97 % | TEMPERATURE: 98 F

## 2022-01-12 DIAGNOSIS — F10.239 ALCOHOL DEPENDENCE WITH WITHDRAWAL, UNSPECIFIED: ICD-10-CM

## 2022-01-12 LAB
ALBUMIN SERPL ELPH-MCNC: 4.1 G/DL — SIGNIFICANT CHANGE UP (ref 3.3–5.2)
ALP SERPL-CCNC: 52 U/L — SIGNIFICANT CHANGE UP (ref 40–120)
ALT FLD-CCNC: 15 U/L — SIGNIFICANT CHANGE UP
ANION GAP SERPL CALC-SCNC: 15 MMOL/L — SIGNIFICANT CHANGE UP (ref 5–17)
APTT BLD: 24.9 SEC — LOW (ref 27.5–35.5)
AST SERPL-CCNC: 28 U/L — SIGNIFICANT CHANGE UP
BASOPHILS # BLD AUTO: 0.08 K/UL — SIGNIFICANT CHANGE UP (ref 0–0.2)
BASOPHILS NFR BLD AUTO: 1.1 % — SIGNIFICANT CHANGE UP (ref 0–2)
BILIRUB SERPL-MCNC: 0.4 MG/DL — SIGNIFICANT CHANGE UP (ref 0.4–2)
BUN SERPL-MCNC: 23.7 MG/DL — HIGH (ref 8–20)
CALCIUM SERPL-MCNC: 8.5 MG/DL — LOW (ref 8.6–10.2)
CHLORIDE SERPL-SCNC: 100 MMOL/L — SIGNIFICANT CHANGE UP (ref 98–107)
CO2 SERPL-SCNC: 21 MMOL/L — LOW (ref 22–29)
CREAT SERPL-MCNC: 0.98 MG/DL — SIGNIFICANT CHANGE UP (ref 0.5–1.3)
EOSINOPHIL # BLD AUTO: 0.1 K/UL — SIGNIFICANT CHANGE UP (ref 0–0.5)
EOSINOPHIL NFR BLD AUTO: 1.4 % — SIGNIFICANT CHANGE UP (ref 0–6)
GLUCOSE SERPL-MCNC: 102 MG/DL — HIGH (ref 70–99)
HCT VFR BLD CALC: 42.1 % — SIGNIFICANT CHANGE UP (ref 39–50)
HGB BLD-MCNC: 14.1 G/DL — SIGNIFICANT CHANGE UP (ref 13–17)
IMM GRANULOCYTES NFR BLD AUTO: 0.6 % — SIGNIFICANT CHANGE UP (ref 0–1.5)
INR BLD: 0.98 RATIO — SIGNIFICANT CHANGE UP (ref 0.88–1.16)
LYMPHOCYTES # BLD AUTO: 1.48 K/UL — SIGNIFICANT CHANGE UP (ref 1–3.3)
LYMPHOCYTES # BLD AUTO: 21.2 % — SIGNIFICANT CHANGE UP (ref 13–44)
MCHC RBC-ENTMCNC: 29.1 PG — SIGNIFICANT CHANGE UP (ref 27–34)
MCHC RBC-ENTMCNC: 33.5 GM/DL — SIGNIFICANT CHANGE UP (ref 32–36)
MCV RBC AUTO: 87 FL — SIGNIFICANT CHANGE UP (ref 80–100)
MONOCYTES # BLD AUTO: 0.67 K/UL — SIGNIFICANT CHANGE UP (ref 0–0.9)
MONOCYTES NFR BLD AUTO: 9.6 % — SIGNIFICANT CHANGE UP (ref 2–14)
NEUTROPHILS # BLD AUTO: 4.61 K/UL — SIGNIFICANT CHANGE UP (ref 1.8–7.4)
NEUTROPHILS NFR BLD AUTO: 66.1 % — SIGNIFICANT CHANGE UP (ref 43–77)
PLATELET # BLD AUTO: 259 K/UL — SIGNIFICANT CHANGE UP (ref 150–400)
POTASSIUM SERPL-MCNC: 5.2 MMOL/L — SIGNIFICANT CHANGE UP (ref 3.5–5.3)
POTASSIUM SERPL-SCNC: 5.2 MMOL/L — SIGNIFICANT CHANGE UP (ref 3.5–5.3)
PROT SERPL-MCNC: 6.8 G/DL — SIGNIFICANT CHANGE UP (ref 6.6–8.7)
PROTHROM AB SERPL-ACNC: 11.4 SEC — SIGNIFICANT CHANGE UP (ref 10.6–13.6)
RBC # BLD: 4.84 M/UL — SIGNIFICANT CHANGE UP (ref 4.2–5.8)
RBC # FLD: 12.1 % — SIGNIFICANT CHANGE UP (ref 10.3–14.5)
SARS-COV-2 RNA SPEC QL NAA+PROBE: SIGNIFICANT CHANGE UP
SODIUM SERPL-SCNC: 136 MMOL/L — SIGNIFICANT CHANGE UP (ref 135–145)
TROPONIN T SERPL-MCNC: <0.01 NG/ML — SIGNIFICANT CHANGE UP (ref 0–0.06)
WBC # BLD: 6.98 K/UL — SIGNIFICANT CHANGE UP (ref 3.8–10.5)
WBC # FLD AUTO: 6.98 K/UL — SIGNIFICANT CHANGE UP (ref 3.8–10.5)

## 2022-01-12 PROCEDURE — 99223 1ST HOSP IP/OBS HIGH 75: CPT

## 2022-01-12 PROCEDURE — 99285 EMERGENCY DEPT VISIT HI MDM: CPT

## 2022-01-12 PROCEDURE — 71045 X-RAY EXAM CHEST 1 VIEW: CPT | Mod: 26

## 2022-01-12 PROCEDURE — 93010 ELECTROCARDIOGRAM REPORT: CPT

## 2022-01-12 RX ORDER — APIXABAN 2.5 MG/1
5 TABLET, FILM COATED ORAL EVERY 12 HOURS
Refills: 0 | Status: DISCONTINUED | OUTPATIENT
Start: 2022-01-12 | End: 2022-01-17

## 2022-01-12 RX ORDER — ATORVASTATIN CALCIUM 80 MG/1
20 TABLET, FILM COATED ORAL AT BEDTIME
Refills: 0 | Status: DISCONTINUED | OUTPATIENT
Start: 2022-01-12 | End: 2022-01-17

## 2022-01-12 RX ORDER — SODIUM CHLORIDE 9 MG/ML
1000 INJECTION INTRAMUSCULAR; INTRAVENOUS; SUBCUTANEOUS ONCE
Refills: 0 | Status: COMPLETED | OUTPATIENT
Start: 2022-01-12 | End: 2022-01-12

## 2022-01-12 RX ORDER — SERTRALINE 25 MG/1
25 TABLET, FILM COATED ORAL
Refills: 0 | Status: DISCONTINUED | OUTPATIENT
Start: 2022-01-12 | End: 2022-01-17

## 2022-01-12 RX ORDER — THIAMINE MONONITRATE (VIT B1) 100 MG
100 TABLET ORAL ONCE
Refills: 0 | Status: COMPLETED | OUTPATIENT
Start: 2022-01-12 | End: 2022-01-12

## 2022-01-12 RX ORDER — METOPROLOL TARTRATE 50 MG
100 TABLET ORAL DAILY
Refills: 0 | Status: DISCONTINUED | OUTPATIENT
Start: 2022-01-12 | End: 2022-01-17

## 2022-01-12 RX ORDER — HYDRALAZINE HCL 50 MG
25 TABLET ORAL ONCE
Refills: 0 | Status: COMPLETED | OUTPATIENT
Start: 2022-01-12 | End: 2022-01-12

## 2022-01-12 RX ORDER — OXYMETAZOLINE HYDROCHLORIDE 0.5 MG/ML
2 SPRAY NASAL
Qty: 0 | Refills: 0 | DISCHARGE

## 2022-01-12 RX ORDER — HYDRALAZINE HCL 50 MG
75 TABLET ORAL THREE TIMES A DAY
Refills: 0 | Status: DISCONTINUED | OUTPATIENT
Start: 2022-01-12 | End: 2022-01-17

## 2022-01-12 RX ORDER — SACUBITRIL AND VALSARTAN 24; 26 MG/1; MG/1
1 TABLET, FILM COATED ORAL
Refills: 0 | Status: DISCONTINUED | OUTPATIENT
Start: 2022-01-12 | End: 2022-01-16

## 2022-01-12 RX ORDER — DIAZEPAM 5 MG
10 TABLET ORAL ONCE
Refills: 0 | Status: DISCONTINUED | OUTPATIENT
Start: 2022-01-12 | End: 2022-01-12

## 2022-01-12 RX ORDER — THIAMINE MONONITRATE (VIT B1) 100 MG
100 TABLET ORAL DAILY
Refills: 0 | Status: COMPLETED | OUTPATIENT
Start: 2022-01-12 | End: 2022-01-15

## 2022-01-12 RX ORDER — NICOTINE POLACRILEX 2 MG
1 GUM BUCCAL DAILY
Refills: 0 | Status: DISCONTINUED | OUTPATIENT
Start: 2022-01-12 | End: 2022-01-17

## 2022-01-12 RX ORDER — DIAZEPAM 5 MG
5 TABLET ORAL ONCE
Refills: 0 | Status: DISCONTINUED | OUTPATIENT
Start: 2022-01-12 | End: 2022-01-12

## 2022-01-12 RX ORDER — FUROSEMIDE 40 MG
40 TABLET ORAL DAILY
Refills: 0 | Status: DISCONTINUED | OUTPATIENT
Start: 2022-01-13 | End: 2022-01-16

## 2022-01-12 RX ORDER — FOLIC ACID 0.8 MG
1 TABLET ORAL DAILY
Refills: 0 | Status: DISCONTINUED | OUTPATIENT
Start: 2022-01-12 | End: 2022-01-17

## 2022-01-12 RX ORDER — AMLODIPINE BESYLATE 2.5 MG/1
10 TABLET ORAL DAILY
Refills: 0 | Status: DISCONTINUED | OUTPATIENT
Start: 2022-01-12 | End: 2022-01-17

## 2022-01-12 RX ADMIN — Medication 2 MILLIGRAM(S): at 20:39

## 2022-01-12 RX ADMIN — SODIUM CHLORIDE 1000 MILLILITER(S): 9 INJECTION INTRAMUSCULAR; INTRAVENOUS; SUBCUTANEOUS at 16:28

## 2022-01-12 RX ADMIN — Medication 5 MILLIGRAM(S): at 16:28

## 2022-01-12 RX ADMIN — Medication 100 MILLIGRAM(S): at 16:28

## 2022-01-12 RX ADMIN — Medication 100 MILLIGRAM(S): at 18:17

## 2022-01-12 RX ADMIN — Medication 2 MILLIGRAM(S): at 21:25

## 2022-01-12 RX ADMIN — Medication 75 MILLIGRAM(S): at 21:25

## 2022-01-12 RX ADMIN — ATORVASTATIN CALCIUM 20 MILLIGRAM(S): 80 TABLET, FILM COATED ORAL at 21:25

## 2022-01-12 RX ADMIN — Medication 25 MILLIGRAM(S): at 18:10

## 2022-01-12 RX ADMIN — Medication 2 MILLIGRAM(S): at 18:02

## 2022-01-12 RX ADMIN — Medication 0.2 MILLIGRAM(S): at 19:58

## 2022-01-12 NOTE — ED PROVIDER NOTE - PHYSICAL EXAMINATION
General: NAD, anxious and agitated.    HEENT: Normocephalic, atraumatic  Neck: No apparent stiffness or JVD  Pulm: Chest wall symmetric and nontender, lungs clear to ascultation   Cardiac: Regular rate and regular rhythm  Abdomen: Nontender and nondistended  Skin: Skin is warm, diaphoretic and intact without rashes or lesions.  Neuro: No motor or sensory deficits   MSK: No deformity or tenderness

## 2022-01-12 NOTE — H&P ADULT - NSICDXPASTMEDICALHX_GEN_ALL_CORE_FT
PAST MEDICAL HISTORY:  Atrial fibrillation     CHF (congestive heart failure)     Depression, unspecified depression type     HTN (hypertension)     Hyperlipidemia     Pulmonary hypertension      PAST MEDICAL HISTORY:  Atrial fibrillation     CHF (congestive heart failure)     Depression, unspecified depression type     HTN (hypertension)     Hyperlipidemia     Pericardial effusion     Pulmonary hypertension

## 2022-01-12 NOTE — H&P ADULT - NSHPLABSRESULTS_GEN_ALL_CORE
cxr reviewed by me, possible cardiomegaly, no obvious infiltrate ,small left. pl. effusion ?  radiologist read pending.

## 2022-01-12 NOTE — ED ADULT NURSE NOTE - OBJECTIVE STATEMENT
Assumed care for patient. Patient AXOX4. Vss. Patient was at [facility] a couple of weeks ago for Afib also seeking help for ETOH. Patient drinks a quart of vodka a day. Presented today to ER for paplitations and HTN. Patient took 4 baby aspirin before coming to ED. Patient was on Hypertensive medication and eliquis and ran out of them 8 days ago. Patient is unvaccinated with mild tremors, denies cp, no headache, No N/V. Lost vision in right eye for a month now. No distress noted at this time, Awaiting results of ct scan.

## 2022-01-12 NOTE — H&P ADULT - NSHPSOCIALHISTORY_GEN_ALL_CORE
smokes 2 ppd for past 40 years. etoh daily for past 2 weeks , 1 quart daily, but always drinks etoh sometimes less sometimes more. denies any IVDA.

## 2022-01-12 NOTE — ED PROVIDER NOTE - CLINICAL SUMMARY MEDICAL DECISION MAKING FREE TEXT BOX
60 y/o M with PMH non obstructive CAD on [facility] 2011, HFrEF with LVEF recovered to 55-60% in 3/2021, AF on Eliquis, severe PH, obesity, alcoholism, depression, presented to ED with c/o palpitations starting today.  Patient likely to withdrawl with CIWA 11. Patient given IV fluids and medication.  EKG labs and imaging performed to evaluate the patient. 62 y/o M with PMH non obstructive CAD on [facility] 2011, HFrEF with LVEF recovered to 55-60% in 3/2021, AF on Eliquis, severe PH, obesity, alcoholism, depression, presented to ED with c/o palpitations starting today.  Patient likely to withdrawal with CIWA 11. Patient given IV fluids and medication.  EKG labs and imaging performed to evaluate the patient.  Admit to medicine for acute ETOH withdrawal

## 2022-01-12 NOTE — ED PROVIDER NOTE - OBJECTIVE STATEMENT
62 y/o M with PMH non obstructive CAD on [facility] 2011, HFrEF with LVEF recovered to 55-60% in 3/2021, AF on Eliquis, severe PH, obesity, alcoholism, depression, presented to ED with c/o palpitations starting today.  Patient stes he has ran out of all meds and has not been taking for weeks.  Patient states his alcohol abuse has worsened and he is at a quart of vodka per day with last drink this AM.  States he wants to go to rehab.  States he is having problems with his roomate and wants to talk to social work.  Otherwise denies pain, bleeding, SOB, chest pain, abdominal pain or fevers.  Denies other pertinent medical problems.  Denies any other substance use. [facility]

## 2022-01-12 NOTE — ED PROVIDER NOTE - ATTENDING CONTRIBUTION TO CARE
Epidural Og: I performed a face to face evaluation of this patient and performed a full history and physical examination on the patient.  I agree with the resident's history, physical examination, and plan of the patient unless otherwise noted. My brief assessment is as follows: pmh as documented, etoh abuse/withdrawal, poor compliance with cardiac meds, wanting detox/rehab, and presenting with palpitations. problems with roommates. tremulous, flushed, tachy ~105, ctab, abd benign, neuro grossly intact. labs, symptom control, reassess

## 2022-01-12 NOTE — H&P ADULT - ASSESSMENT
62 y/o male was BIBA. As per triage note pt. came in as he is homeless, has been drinking etoh daily , last use this am, has been using a quart of vodka daily for past 2 weeks, not using his meds. pt. has h/o afib, chf, htn, depression. pt. stated that his roommate threw his meds away and could not get more and has not used meds in about 3-4 week. no cp. no sob, some palpitations. no abd. pain. no n/v/d. no si/hi. pt. stated that he wants to use meds but due to his home situation could not use them. pt. wants to go to a rehab.     - etoh abuse, pt. will be on ciwa protocol with ativan. pt. will benefit from going to a drug rehab.    - accelerated htn, will continue his home meds, pt. has not been using his meds for past 3-4 weeks.     - h/o afib, will continue pt's eliquis. trop negative x 1. no cp.     - tobacco abuse, smoking cessation discussed. will keep on nicotine patch.    - h/o chf, clinically not in chf exacerbation, will keep on his home meds.     - Homeless,  consult requested.

## 2022-01-12 NOTE — ED ADULT TRIAGE NOTE - CHIEF COMPLAINT QUOTE
biba  due to homelessness/ETOH/not taking medications   hx of AFIB   walked into an imaging center for help.  denies any pain/discomfort

## 2022-01-12 NOTE — H&P ADULT - HISTORY OF PRESENT ILLNESS
60 y/o male was BIBA. As per triage note pt. came in as he is homeless, has been drinking etoh daily , last use this am, has been using a quart of vodka daily for past 2 weeks, not using his meds. pt. has h/o afib, chf, htn, depression. pt. stated that his roommate threw his meds away and could not get more and has not used meds in about 3-4 months. no cp. no sob, some palpitations. no abd. pain. no n/v/d. no si/hi. pt. stated that he wants to use meds but due to his home situation could not use them. pt. wants to go to a rehab.  62 y/o male was BIBA. As per triage note pt. came in as he is homeless, has been drinking etoh daily , last use this am, has been using a quart of vodka daily for past 2 weeks, not using his meds. pt. has h/o afib, chf, htn, depression. pt. stated that his roommate threw his meds away and could not get more and has not used meds in about 3-4 weeks . no cp. no sob, some palpitations. no abd. pain. no n/v/d. no si/hi. pt. stated that he wants to use meds but due to his home situation could not use them. pt. got ativan and valium in the ER for his etoh withdrawal.  pt. wants to go to a rehab.

## 2022-01-12 NOTE — H&P ADULT - NSHPPHYSICALEXAM_GEN_ALL_CORE
Vital Signs Last 24 Hrs  T(C): 37.2 (12 Jan 2022 19:20), Max: 37.2 (12 Jan 2022 19:20)  T(F): 98.9 (12 Jan 2022 19:20), Max: 98.9 (12 Jan 2022 19:20)  HR: 88 (12 Jan 2022 19:20) (88 - 98)  BP: 187/108 (12 Jan 2022 19:20) (187/108 - 195/102)  BP(mean): --  RR: 18 (12 Jan 2022 19:20) (18 - 20)  SpO2: 97% (12 Jan 2022 19:20) (97% - 97%)    General: pt. in bed  mildly anxious but not in distress.  HEENT: AT, NC. PERRL. intact EOM. no throat erythema or exudate.   Neck: supple. no JVD.  Chest: CTA bilaterally, no w /r/r.   Heart: S1,S2. RRR. no heart murmur. no edema.   Abdomen: soft. non-tender. non-distended. + BS.   Ext: no calf tenderness. mild tremors in out stretched hands ( got valium and ativan in the ER ). distal pulses intact.  Neuro: AAO x3. no focal weakness. no speech disorder, cns ii to xii intact. m /r/s intact.  Skin: mild facial flushing noted, warm and dry skin.  lymphatic sytem : no lymphadenopathy.   psych : mildly anxious but no agitation, no si/hi.

## 2022-01-13 LAB
ANION GAP SERPL CALC-SCNC: 15 MMOL/L — SIGNIFICANT CHANGE UP (ref 5–17)
BUN SERPL-MCNC: 23.4 MG/DL — HIGH (ref 8–20)
CALCIUM SERPL-MCNC: 8 MG/DL — LOW (ref 8.6–10.2)
CHLORIDE SERPL-SCNC: 104 MMOL/L — SIGNIFICANT CHANGE UP (ref 98–107)
CO2 SERPL-SCNC: 22 MMOL/L — SIGNIFICANT CHANGE UP (ref 22–29)
CREAT SERPL-MCNC: 1.13 MG/DL — SIGNIFICANT CHANGE UP (ref 0.5–1.3)
GLUCOSE SERPL-MCNC: 129 MG/DL — HIGH (ref 70–99)
POTASSIUM SERPL-MCNC: 3.5 MMOL/L — SIGNIFICANT CHANGE UP (ref 3.5–5.3)
POTASSIUM SERPL-SCNC: 3.5 MMOL/L — SIGNIFICANT CHANGE UP (ref 3.5–5.3)
SODIUM SERPL-SCNC: 141 MMOL/L — SIGNIFICANT CHANGE UP (ref 135–145)

## 2022-01-13 PROCEDURE — 99233 SBSQ HOSP IP/OBS HIGH 50: CPT

## 2022-01-13 RX ORDER — HYDRALAZINE HCL 50 MG
50 TABLET ORAL ONCE
Refills: 0 | Status: COMPLETED | OUTPATIENT
Start: 2022-01-13 | End: 2022-01-13

## 2022-01-13 RX ADMIN — Medication 100 MILLIGRAM(S): at 13:17

## 2022-01-13 RX ADMIN — Medication 2 MILLIGRAM(S): at 06:31

## 2022-01-13 RX ADMIN — Medication 1 PATCH: at 13:15

## 2022-01-13 RX ADMIN — Medication 40 MILLIGRAM(S): at 06:31

## 2022-01-13 RX ADMIN — Medication 75 MILLIGRAM(S): at 13:16

## 2022-01-13 RX ADMIN — APIXABAN 5 MILLIGRAM(S): 2.5 TABLET, FILM COATED ORAL at 18:34

## 2022-01-13 RX ADMIN — Medication 75 MILLIGRAM(S): at 22:26

## 2022-01-13 RX ADMIN — SACUBITRIL AND VALSARTAN 1 TABLET(S): 24; 26 TABLET, FILM COATED ORAL at 18:35

## 2022-01-13 RX ADMIN — Medication 75 MILLIGRAM(S): at 06:33

## 2022-01-13 RX ADMIN — Medication 1 TABLET(S): at 13:16

## 2022-01-13 RX ADMIN — SERTRALINE 25 MILLIGRAM(S): 25 TABLET, FILM COATED ORAL at 06:33

## 2022-01-13 RX ADMIN — Medication 1 MILLIGRAM(S): at 13:16

## 2022-01-13 RX ADMIN — Medication 2 MILLIGRAM(S): at 15:48

## 2022-01-13 RX ADMIN — Medication 50 MILLIGRAM(S): at 02:10

## 2022-01-13 RX ADMIN — APIXABAN 5 MILLIGRAM(S): 2.5 TABLET, FILM COATED ORAL at 06:33

## 2022-01-13 RX ADMIN — ATORVASTATIN CALCIUM 20 MILLIGRAM(S): 80 TABLET, FILM COATED ORAL at 22:27

## 2022-01-13 RX ADMIN — AMLODIPINE BESYLATE 10 MILLIGRAM(S): 2.5 TABLET ORAL at 06:32

## 2022-01-13 RX ADMIN — SERTRALINE 25 MILLIGRAM(S): 25 TABLET, FILM COATED ORAL at 18:35

## 2022-01-13 RX ADMIN — Medication 1 PATCH: at 18:44

## 2022-01-13 RX ADMIN — Medication 1.5 MILLIGRAM(S): at 22:25

## 2022-01-13 RX ADMIN — Medication 2 MILLIGRAM(S): at 10:47

## 2022-01-13 RX ADMIN — Medication 1.5 MILLIGRAM(S): at 18:41

## 2022-01-13 RX ADMIN — Medication 2 MILLIGRAM(S): at 02:11

## 2022-01-13 NOTE — SBIRT NOTE ADULT - NSSBIRTALCPASSREFTXDET_GEN_A_CORE
Provided SBIRT services: Full screen positive. Referral to Treatment Performed. Screening results were reviewed with the patient and patient was provided information about healthy guidelines and potential negative consequences associated with level of risk. Motivation and readiness to reduce or stop use was discussed and goals and activities to make changes were suggested/offered.  Referral for complete assessment and level of care determination at a certified treatment facility was completed by contacting the treatment facility via phone.  Pending medical clearance; patient under review with [facility] and [facility] alcohol treatment centers; [facility] faxing referral packets w/patients consent.

## 2022-01-13 NOTE — PROGRESS NOTE ADULT - ASSESSMENT
60 y/o male was BIBA. As per triage note pt. came in as he is homeless, has been drinking etoh daily , last use this am, has been using a quart of vodka daily for past 2 weeks, not using his meds. pt. has h/o afib, chf, htn, depression. pt. stated that his roommate threw his meds away and could not get more and has not used meds in about 3-4 week. no cp. no sob, some palpitations. no abd. pain. no n/v/d. no si/hi. pt. stated that he wants to use meds but due to his home situation could not use them. pt. wants to go to a rehab.     EtOH abuse  - CIWA protocol  - Ativen per protocol  - Folic Acid  - MVI  - Thiamine    HTN  - Patient has not been taking his home meds for the last 3-4 weeks  - Toprol XL 100mg daily  - Hydralazine 75mg TID  - Norvasc 10mg daily    CHF  - Entresto  - Toprol XL 100mg daily  - Lasix 40mg daily  - Lipitor 20mg nightly     Afib  - Toprol XL 100mg daily  - Eliquis 5mg q12h    DVT ppx  - Eliquis 5mg q12h

## 2022-01-13 NOTE — SBIRT NOTE ADULT - NSSBIRTSAVECONSULT_GEN_A_CORE
Patient completed full phone screening with [facility]; half capacity reported- bed availability limited, projected bed date 1/25./Complete Patient completed full phone screening with [facility]y L[facility] ; under review for admission on Friday, 1/14, pending confirmation from Rudy Kilpatrick admission's supervisor; Emilie will coordinate via [phone]; [facility] & MD aware./Complete

## 2022-01-13 NOTE — PATIENT PROFILE ADULT - FALL HARM RISK - UNIVERSAL INTERVENTIONS
Bed in lowest position, wheels locked, appropriate side rails in place/Call bell, personal items and telephone in reach/Instruct patient to call for assistance before getting out of bed or chair/Non-slip footwear when patient is out of bed/[place] to call system/Physically safe environment - no spills, clutter or unnecessary equipment/Purposeful Proactive Rounding/Room/bathroom lighting operational, light cord in reach

## 2022-01-14 LAB
ANION GAP SERPL CALC-SCNC: 14 MMOL/L — SIGNIFICANT CHANGE UP (ref 5–17)
BUN SERPL-MCNC: 19.2 MG/DL — SIGNIFICANT CHANGE UP (ref 8–20)
CALCIUM SERPL-MCNC: 9.1 MG/DL — SIGNIFICANT CHANGE UP (ref 8.6–10.2)
CHLORIDE SERPL-SCNC: 102 MMOL/L — SIGNIFICANT CHANGE UP (ref 98–107)
CO2 SERPL-SCNC: 26 MMOL/L — SIGNIFICANT CHANGE UP (ref 22–29)
CREAT SERPL-MCNC: 1.07 MG/DL — SIGNIFICANT CHANGE UP (ref 0.5–1.3)
GLUCOSE SERPL-MCNC: 116 MG/DL — HIGH (ref 70–99)
HCT VFR BLD CALC: 47.7 % — SIGNIFICANT CHANGE UP (ref 39–50)
HGB BLD-MCNC: 16.2 G/DL — SIGNIFICANT CHANGE UP (ref 13–17)
MCHC RBC-ENTMCNC: 29.8 PG — SIGNIFICANT CHANGE UP (ref 27–34)
MCHC RBC-ENTMCNC: 34 GM/DL — SIGNIFICANT CHANGE UP (ref 32–36)
MCV RBC AUTO: 87.7 FL — SIGNIFICANT CHANGE UP (ref 80–100)
PLATELET # BLD AUTO: 262 K/UL — SIGNIFICANT CHANGE UP (ref 150–400)
POTASSIUM SERPL-MCNC: 3.5 MMOL/L — SIGNIFICANT CHANGE UP (ref 3.5–5.3)
POTASSIUM SERPL-SCNC: 3.5 MMOL/L — SIGNIFICANT CHANGE UP (ref 3.5–5.3)
RBC # BLD: 5.44 M/UL — SIGNIFICANT CHANGE UP (ref 4.2–5.8)
RBC # FLD: 11.9 % — SIGNIFICANT CHANGE UP (ref 10.3–14.5)
SODIUM SERPL-SCNC: 142 MMOL/L — SIGNIFICANT CHANGE UP (ref 135–145)
WBC # BLD: 7.29 K/UL — SIGNIFICANT CHANGE UP (ref 3.8–10.5)
WBC # FLD AUTO: 7.29 K/UL — SIGNIFICANT CHANGE UP (ref 3.8–10.5)

## 2022-01-14 PROCEDURE — 99233 SBSQ HOSP IP/OBS HIGH 50: CPT

## 2022-01-14 RX ORDER — NICOTINE POLACRILEX 2 MG
2 GUM BUCCAL ONCE
Refills: 0 | Status: COMPLETED | OUTPATIENT
Start: 2022-01-14 | End: 2022-01-14

## 2022-01-14 RX ADMIN — ATORVASTATIN CALCIUM 20 MILLIGRAM(S): 80 TABLET, FILM COATED ORAL at 21:48

## 2022-01-14 RX ADMIN — SERTRALINE 25 MILLIGRAM(S): 25 TABLET, FILM COATED ORAL at 05:37

## 2022-01-14 RX ADMIN — Medication 100 MILLIGRAM(S): at 11:01

## 2022-01-14 RX ADMIN — Medication 1 PATCH: at 11:02

## 2022-01-14 RX ADMIN — Medication 1.5 MILLIGRAM(S): at 10:57

## 2022-01-14 RX ADMIN — Medication 1 TABLET(S): at 11:01

## 2022-01-14 RX ADMIN — Medication 1 MILLIGRAM(S): at 21:48

## 2022-01-14 RX ADMIN — Medication 75 MILLIGRAM(S): at 05:38

## 2022-01-14 RX ADMIN — Medication 1.5 MILLIGRAM(S): at 05:35

## 2022-01-14 RX ADMIN — APIXABAN 5 MILLIGRAM(S): 2.5 TABLET, FILM COATED ORAL at 05:35

## 2022-01-14 RX ADMIN — APIXABAN 5 MILLIGRAM(S): 2.5 TABLET, FILM COATED ORAL at 17:41

## 2022-01-14 RX ADMIN — Medication 1 MILLIGRAM(S): at 17:41

## 2022-01-14 RX ADMIN — Medication 1 MILLIGRAM(S): at 11:01

## 2022-01-14 RX ADMIN — Medication 75 MILLIGRAM(S): at 14:50

## 2022-01-14 RX ADMIN — SERTRALINE 25 MILLIGRAM(S): 25 TABLET, FILM COATED ORAL at 17:41

## 2022-01-14 RX ADMIN — Medication 100 MILLIGRAM(S): at 05:37

## 2022-01-14 RX ADMIN — Medication 75 MILLIGRAM(S): at 21:49

## 2022-01-14 RX ADMIN — Medication 1 MILLIGRAM(S): at 00:04

## 2022-01-14 RX ADMIN — SACUBITRIL AND VALSARTAN 1 TABLET(S): 24; 26 TABLET, FILM COATED ORAL at 05:36

## 2022-01-14 RX ADMIN — Medication 1.5 MILLIGRAM(S): at 14:51

## 2022-01-14 RX ADMIN — AMLODIPINE BESYLATE 10 MILLIGRAM(S): 2.5 TABLET ORAL at 05:36

## 2022-01-14 RX ADMIN — Medication 40 MILLIGRAM(S): at 05:35

## 2022-01-14 RX ADMIN — SACUBITRIL AND VALSARTAN 1 TABLET(S): 24; 26 TABLET, FILM COATED ORAL at 17:41

## 2022-01-14 RX ADMIN — Medication 1 PATCH: at 14:36

## 2022-01-14 NOTE — PROGRESS NOTE ADULT - ASSESSMENT
62 y/o M with h/o EtOH abuse admitted for EtOH intoxication impending withdrawal.     EtOH abuse impending withdrawal  - CIWA protocol  - Ativen per protocol  - Folic Acid daily  - MVI daily  - Thiamine daily    HTN  - Patient has not been taking his home meds for the last 3-4 weeks  - Toprol XL 100mg daily  - Hydralazine 75mg TID  - Norvasc 10mg daily    CHF  - Entresto  - Toprol XL 100mg daily  - Lasix 40mg daily  - Lipitor 20mg nightly     Afib  - Toprol XL 100mg daily  - Eliquis 5mg q12h    DVT ppx  - Eliquis 5mg q12h    Dispo: 1-2 days

## 2022-01-15 LAB
ANION GAP SERPL CALC-SCNC: 11 MMOL/L — SIGNIFICANT CHANGE UP (ref 5–17)
ANION GAP SERPL CALC-SCNC: 14 MMOL/L — SIGNIFICANT CHANGE UP (ref 5–17)
BUN SERPL-MCNC: 32.1 MG/DL — HIGH (ref 8–20)
BUN SERPL-MCNC: 32.8 MG/DL — HIGH (ref 8–20)
CALCIUM SERPL-MCNC: 10.2 MG/DL — SIGNIFICANT CHANGE UP (ref 8.6–10.2)
CALCIUM SERPL-MCNC: 8.8 MG/DL — SIGNIFICANT CHANGE UP (ref 8.6–10.2)
CHLORIDE SERPL-SCNC: 100 MMOL/L — SIGNIFICANT CHANGE UP (ref 98–107)
CHLORIDE SERPL-SCNC: 98 MMOL/L — SIGNIFICANT CHANGE UP (ref 98–107)
CO2 SERPL-SCNC: 26 MMOL/L — SIGNIFICANT CHANGE UP (ref 22–29)
CO2 SERPL-SCNC: 28 MMOL/L — SIGNIFICANT CHANGE UP (ref 22–29)
CREAT SERPL-MCNC: 1.09 MG/DL — SIGNIFICANT CHANGE UP (ref 0.5–1.3)
CREAT SERPL-MCNC: 1.49 MG/DL — HIGH (ref 0.5–1.3)
GLUCOSE SERPL-MCNC: 106 MG/DL — HIGH (ref 70–99)
GLUCOSE SERPL-MCNC: 133 MG/DL — HIGH (ref 70–99)
HCT VFR BLD CALC: 49.1 % — SIGNIFICANT CHANGE UP (ref 39–50)
HGB BLD-MCNC: 16.1 G/DL — SIGNIFICANT CHANGE UP (ref 13–17)
MAGNESIUM SERPL-MCNC: 1.9 MG/DL — SIGNIFICANT CHANGE UP (ref 1.6–2.6)
MCHC RBC-ENTMCNC: 29.2 PG — SIGNIFICANT CHANGE UP (ref 27–34)
MCHC RBC-ENTMCNC: 32.8 GM/DL — SIGNIFICANT CHANGE UP (ref 32–36)
MCV RBC AUTO: 88.9 FL — SIGNIFICANT CHANGE UP (ref 80–100)
PHOSPHATE SERPL-MCNC: 3.5 MG/DL — SIGNIFICANT CHANGE UP (ref 2.4–4.7)
PLATELET # BLD AUTO: 262 K/UL — SIGNIFICANT CHANGE UP (ref 150–400)
POTASSIUM SERPL-MCNC: 3.7 MMOL/L — SIGNIFICANT CHANGE UP (ref 3.5–5.3)
POTASSIUM SERPL-MCNC: 5 MMOL/L — SIGNIFICANT CHANGE UP (ref 3.5–5.3)
POTASSIUM SERPL-SCNC: 3.7 MMOL/L — SIGNIFICANT CHANGE UP (ref 3.5–5.3)
POTASSIUM SERPL-SCNC: 5 MMOL/L — SIGNIFICANT CHANGE UP (ref 3.5–5.3)
RBC # BLD: 5.52 M/UL — SIGNIFICANT CHANGE UP (ref 4.2–5.8)
RBC # FLD: 11.8 % — SIGNIFICANT CHANGE UP (ref 10.3–14.5)
SODIUM SERPL-SCNC: 137 MMOL/L — SIGNIFICANT CHANGE UP (ref 135–145)
SODIUM SERPL-SCNC: 140 MMOL/L — SIGNIFICANT CHANGE UP (ref 135–145)
TROPONIN T SERPL-MCNC: <0.01 NG/ML — SIGNIFICANT CHANGE UP (ref 0–0.06)
WBC # BLD: 8.79 K/UL — SIGNIFICANT CHANGE UP (ref 3.8–10.5)
WBC # FLD AUTO: 8.79 K/UL — SIGNIFICANT CHANGE UP (ref 3.8–10.5)

## 2022-01-15 PROCEDURE — 93010 ELECTROCARDIOGRAM REPORT: CPT

## 2022-01-15 PROCEDURE — 99233 SBSQ HOSP IP/OBS HIGH 50: CPT

## 2022-01-15 RX ORDER — NICOTINE POLACRILEX 2 MG
2 GUM BUCCAL EVERY 4 HOURS
Refills: 0 | Status: DISCONTINUED | OUTPATIENT
Start: 2022-01-15 | End: 2022-01-17

## 2022-01-15 RX ORDER — HYDRALAZINE HCL 50 MG
10 TABLET ORAL ONCE
Refills: 0 | Status: COMPLETED | OUTPATIENT
Start: 2022-01-15 | End: 2022-01-15

## 2022-01-15 RX ORDER — CLONAZEPAM 1 MG
1 TABLET ORAL ONCE
Refills: 0 | Status: DISCONTINUED | OUTPATIENT
Start: 2022-01-15 | End: 2022-01-15

## 2022-01-15 RX ORDER — CLONAZEPAM 1 MG
0.5 TABLET ORAL EVERY 12 HOURS
Refills: 0 | Status: DISCONTINUED | OUTPATIENT
Start: 2022-01-15 | End: 2022-01-17

## 2022-01-15 RX ADMIN — Medication 1 MILLIGRAM(S): at 01:48

## 2022-01-15 RX ADMIN — Medication 2 MILLIGRAM(S): at 16:23

## 2022-01-15 RX ADMIN — Medication 100 MILLIGRAM(S): at 11:20

## 2022-01-15 RX ADMIN — Medication 1 MILLIGRAM(S): at 14:48

## 2022-01-15 RX ADMIN — SACUBITRIL AND VALSARTAN 1 TABLET(S): 24; 26 TABLET, FILM COATED ORAL at 05:35

## 2022-01-15 RX ADMIN — APIXABAN 5 MILLIGRAM(S): 2.5 TABLET, FILM COATED ORAL at 05:35

## 2022-01-15 RX ADMIN — SERTRALINE 25 MILLIGRAM(S): 25 TABLET, FILM COATED ORAL at 17:49

## 2022-01-15 RX ADMIN — Medication 1 MILLIGRAM(S): at 00:53

## 2022-01-15 RX ADMIN — SERTRALINE 25 MILLIGRAM(S): 25 TABLET, FILM COATED ORAL at 05:36

## 2022-01-15 RX ADMIN — Medication 1 MILLIGRAM(S): at 16:25

## 2022-01-15 RX ADMIN — Medication 100 MILLIGRAM(S): at 05:36

## 2022-01-15 RX ADMIN — Medication 1 MILLIGRAM(S): at 11:20

## 2022-01-15 RX ADMIN — Medication 75 MILLIGRAM(S): at 14:48

## 2022-01-15 RX ADMIN — Medication 75 MILLIGRAM(S): at 05:35

## 2022-01-15 RX ADMIN — Medication 2 MILLIGRAM(S): at 09:07

## 2022-01-15 RX ADMIN — Medication 2 MILLIGRAM(S): at 22:20

## 2022-01-15 RX ADMIN — APIXABAN 5 MILLIGRAM(S): 2.5 TABLET, FILM COATED ORAL at 17:49

## 2022-01-15 RX ADMIN — Medication 40 MILLIGRAM(S): at 05:35

## 2022-01-15 RX ADMIN — ATORVASTATIN CALCIUM 20 MILLIGRAM(S): 80 TABLET, FILM COATED ORAL at 22:20

## 2022-01-15 RX ADMIN — Medication 1 MILLIGRAM(S): at 05:39

## 2022-01-15 RX ADMIN — Medication 2 MILLIGRAM(S): at 17:49

## 2022-01-15 RX ADMIN — Medication 2 MILLIGRAM(S): at 00:55

## 2022-01-15 RX ADMIN — Medication 2 MILLIGRAM(S): at 11:22

## 2022-01-15 RX ADMIN — Medication 0.5 MILLIGRAM(S): at 22:19

## 2022-01-15 RX ADMIN — Medication 0.5 MILLIGRAM(S): at 17:49

## 2022-01-15 RX ADMIN — Medication 1 TABLET(S): at 11:20

## 2022-01-15 RX ADMIN — Medication 10 MILLIGRAM(S): at 01:15

## 2022-01-15 RX ADMIN — Medication 0.5 MILLIGRAM(S): at 17:48

## 2022-01-15 RX ADMIN — Medication 1 MILLIGRAM(S): at 11:22

## 2022-01-15 RX ADMIN — Medication 75 MILLIGRAM(S): at 22:20

## 2022-01-15 RX ADMIN — AMLODIPINE BESYLATE 10 MILLIGRAM(S): 2.5 TABLET ORAL at 05:35

## 2022-01-15 NOTE — PROGRESS NOTE ADULT - ASSESSMENT
60 y/o M with h/o EtOH abuse admitted for EtOH intoxication impending withdrawal.     #EtOH abuse   - on [facility] protocol  - w/ probable thiamine defc- thiamine   - folic acid, multivitamin    #HTN- Essential   - metoprolol, hydralyzine, norvasc  - monitor blood pressure     #Chronic Diastolic Heart Failure   - echo 6/2021 lvef 60%  - entresto, lasix, metoprolo    #HLD  - staitn     #Afib  - metoprolol and eliquis     #DVT Prophylaxis  - eliquis

## 2022-01-15 NOTE — CHART NOTE - NSCHARTNOTEFT_GEN_A_CORE
Pt with T wave changes on monitor per monitor tech; appear more elevated  Pt seen and examined at bedside  Resting comfortably in NAD   Denies chest pain, SOB, palpitations, n/v/d/c, abdominal pain, HA, dizziness, blurry vision  All vital signs stable  +S1, S2, RRR, lungs CTABL  EKG without any ST-T wave changes, NSR   Stat BMP, Mag, Phos, Trop  RN to notify of any acute change in pt status

## 2022-01-16 ENCOUNTER — TRANSCRIPTION ENCOUNTER (OUTPATIENT)
Age: 62
End: 2022-01-16

## 2022-01-16 LAB
ANION GAP SERPL CALC-SCNC: 13 MMOL/L — SIGNIFICANT CHANGE UP (ref 5–17)
BUN SERPL-MCNC: 25.4 MG/DL — HIGH (ref 8–20)
CALCIUM SERPL-MCNC: 8.9 MG/DL — SIGNIFICANT CHANGE UP (ref 8.6–10.2)
CHLORIDE SERPL-SCNC: 102 MMOL/L — SIGNIFICANT CHANGE UP (ref 98–107)
CO2 SERPL-SCNC: 24 MMOL/L — SIGNIFICANT CHANGE UP (ref 22–29)
CREAT SERPL-MCNC: 1.15 MG/DL — SIGNIFICANT CHANGE UP (ref 0.5–1.3)
GLUCOSE SERPL-MCNC: 164 MG/DL — HIGH (ref 70–99)
POTASSIUM SERPL-MCNC: 4 MMOL/L — SIGNIFICANT CHANGE UP (ref 3.5–5.3)
POTASSIUM SERPL-SCNC: 4 MMOL/L — SIGNIFICANT CHANGE UP (ref 3.5–5.3)
SODIUM SERPL-SCNC: 139 MMOL/L — SIGNIFICANT CHANGE UP (ref 135–145)

## 2022-01-16 PROCEDURE — 99232 SBSQ HOSP IP/OBS MODERATE 35: CPT

## 2022-01-16 RX ORDER — NICOTINE POLACRILEX 2 MG
1 GUM BUCCAL
Qty: 30 | Refills: 0
Start: 2022-01-16 | End: 2022-02-14

## 2022-01-16 RX ORDER — SACUBITRIL AND VALSARTAN 24; 26 MG/1; MG/1
1 TABLET, FILM COATED ORAL
Refills: 0 | Status: DISCONTINUED | OUTPATIENT
Start: 2022-01-16 | End: 2022-01-17

## 2022-01-16 RX ORDER — THIAMINE MONONITRATE (VIT B1) 100 MG
1 TABLET ORAL
Qty: 30 | Refills: 0
Start: 2022-01-16 | End: 2022-02-14

## 2022-01-16 RX ORDER — HYDROXYZINE HCL 10 MG
1 TABLET ORAL
Qty: 0 | Refills: 0 | DISCHARGE

## 2022-01-16 RX ORDER — THIAMINE MONONITRATE (VIT B1) 100 MG
100 TABLET ORAL DAILY
Refills: 0 | Status: DISCONTINUED | OUTPATIENT
Start: 2022-01-16 | End: 2022-01-17

## 2022-01-16 RX ORDER — FUROSEMIDE 40 MG
40 TABLET ORAL DAILY
Refills: 0 | Status: DISCONTINUED | OUTPATIENT
Start: 2022-01-16 | End: 2022-01-17

## 2022-01-16 RX ORDER — FOLIC ACID 0.8 MG
1 TABLET ORAL
Qty: 30 | Refills: 0
Start: 2022-01-16 | End: 2022-02-14

## 2022-01-16 RX ORDER — SODIUM CHLORIDE 9 MG/ML
1000 INJECTION INTRAMUSCULAR; INTRAVENOUS; SUBCUTANEOUS
Refills: 0 | Status: DISCONTINUED | OUTPATIENT
Start: 2022-01-16 | End: 2022-01-16

## 2022-01-16 RX ADMIN — Medication 1 PATCH: at 11:34

## 2022-01-16 RX ADMIN — Medication 100 MILLIGRAM(S): at 05:20

## 2022-01-16 RX ADMIN — Medication 0.5 MILLIGRAM(S): at 05:20

## 2022-01-16 RX ADMIN — SACUBITRIL AND VALSARTAN 1 TABLET(S): 24; 26 TABLET, FILM COATED ORAL at 06:30

## 2022-01-16 RX ADMIN — Medication 40 MILLIGRAM(S): at 06:29

## 2022-01-16 RX ADMIN — APIXABAN 5 MILLIGRAM(S): 2.5 TABLET, FILM COATED ORAL at 05:24

## 2022-01-16 RX ADMIN — Medication 2 MILLIGRAM(S): at 12:44

## 2022-01-16 RX ADMIN — Medication 75 MILLIGRAM(S): at 13:53

## 2022-01-16 RX ADMIN — Medication 0.5 MILLIGRAM(S): at 02:43

## 2022-01-16 RX ADMIN — Medication 1 PATCH: at 19:30

## 2022-01-16 RX ADMIN — Medication 2 MILLIGRAM(S): at 22:01

## 2022-01-16 RX ADMIN — Medication 75 MILLIGRAM(S): at 05:24

## 2022-01-16 RX ADMIN — SODIUM CHLORIDE 75 MILLILITER(S): 9 INJECTION INTRAMUSCULAR; INTRAVENOUS; SUBCUTANEOUS at 02:44

## 2022-01-16 RX ADMIN — Medication 1 TABLET(S): at 11:35

## 2022-01-16 RX ADMIN — SERTRALINE 25 MILLIGRAM(S): 25 TABLET, FILM COATED ORAL at 05:20

## 2022-01-16 RX ADMIN — Medication 2 MILLIGRAM(S): at 02:44

## 2022-01-16 RX ADMIN — APIXABAN 5 MILLIGRAM(S): 2.5 TABLET, FILM COATED ORAL at 18:03

## 2022-01-16 RX ADMIN — Medication 0.5 MILLIGRAM(S): at 18:02

## 2022-01-16 RX ADMIN — SERTRALINE 25 MILLIGRAM(S): 25 TABLET, FILM COATED ORAL at 18:02

## 2022-01-16 RX ADMIN — Medication 2 MILLIGRAM(S): at 21:56

## 2022-01-16 RX ADMIN — Medication 2 MILLIGRAM(S): at 05:21

## 2022-01-16 RX ADMIN — AMLODIPINE BESYLATE 10 MILLIGRAM(S): 2.5 TABLET ORAL at 05:20

## 2022-01-16 RX ADMIN — Medication 0.5 MILLIGRAM(S): at 05:21

## 2022-01-16 RX ADMIN — SACUBITRIL AND VALSARTAN 1 TABLET(S): 24; 26 TABLET, FILM COATED ORAL at 18:03

## 2022-01-16 RX ADMIN — Medication 1 MILLIGRAM(S): at 11:33

## 2022-01-16 RX ADMIN — Medication 0.5 MILLIGRAM(S): at 09:20

## 2022-01-16 RX ADMIN — Medication 2 MILLIGRAM(S): at 18:01

## 2022-01-16 RX ADMIN — Medication 100 MILLIGRAM(S): at 12:37

## 2022-01-16 RX ADMIN — ATORVASTATIN CALCIUM 20 MILLIGRAM(S): 80 TABLET, FILM COATED ORAL at 21:57

## 2022-01-16 RX ADMIN — Medication 75 MILLIGRAM(S): at 21:57

## 2022-01-16 RX ADMIN — Medication 2 MILLIGRAM(S): at 13:54

## 2022-01-16 NOTE — PROGRESS NOTE ADULT - ASSESSMENT
62 y/o M with h/o EtOH abuse admitted for EtOH intoxication impending withdrawal.     #EtOH abuse   - on [facility] protocol  - w/ probable thiamine defc- thiamine   - folic acid, multivitamin    #HTN- Essential   - metoprolol, hydralyzine, norvasc  - monitor blood pressure     #Chronic Diastolic Heart Failure   - echo 6/2021 lvef 60%  - entresto, lasix, metoprolo    #HLD  - staitn     #Afib  - metoprolol and eliquis     #DVT Prophylaxis  - eliquis     ISTOP reviewed with patient bedside advised on 0.5mg klonopin bid not 6mg he confirmed prescribed is correct from [facility].  patient stable for d/c medically. 60 y/o M with h/o EtOH abuse admitted for EtOH intoxication impending withdrawal.     #EtOH abuse   - on [facility] protocol  - w/ probable thiamine defc- thiamine   - folic acid, multivitamin    #HTN- Essential   - metoprolol, hydralyzine, norvasc  - monitor blood pressure     #Chronic Diastolic Heart Failure   - echo 6/2021 lvef 60%  - entresto, lasix, metoprolo    #HLD  - staitn     #Afib  - metoprolol and eliquis     #DVT Prophylaxis  - eliquis     [id number] reviewed with patient bedside advised on 0.5mg klonopin bid not 6mg he confirmed prescribed is correct from [facility].  patient stable for d/c medically.

## 2022-01-16 NOTE — DISCHARGE NOTE PROVIDER - HOSPITAL COURSE
61 year old male coming to hospital after being brought in by hospital for alcohol abuse. patient states has been drinking quart of vodka. s/p ciwa now being discharged in stable condition.    Time spent on patients discharge 32 minutes

## 2022-01-16 NOTE — DISCHARGE NOTE PROVIDER - NSDCMRMEDTOKEN_GEN_ALL_CORE_FT
amLODIPine 10 mg oral tablet: 1 tab(s) orally once a day  atorvastatin 20 mg oral tablet: 1 tab(s) orally once a day  clonazePAM 0.5 mg oral tablet: 1 tab(s) orally 2 times a day  Eliquis 5 mg oral tablet: 1 tab(s) orally 2 times a day   Entresto 49 mg-51 mg oral tablet: 1 tab(s) orally 2 times a day  folic acid 1 mg oral tablet: 1 tab(s) orally once a day  furosemide 40 mg oral tablet: 1 tab(s) orally once a day  hydrALAZINE 25 mg oral tablet: 3 tab (75 mG) orally 3 times a day  metoprolol succinate 100 mg oral tablet, extended release: 2 tab(s) orally once a day  Multiple Vitamins oral tablet: 1 tab(s) orally once a day  nicotine 21 mg/24 hr transdermal film, extended release: 1 patch transdermal once a day   sertraline 25 mg oral tablet: 1 tab(s) orally 2 times a day  thiamine 100 mg oral tablet: 1 tab(s) orally once a day

## 2022-01-16 NOTE — DISCHARGE NOTE PROVIDER - PROVIDER TOKENS
PROVIDER:[TOKEN:[[id number]]],FREE:[LAST:[Primary Care Doctor],PHONE:[(   )    -],FAX:[(   )    -]]

## 2022-01-16 NOTE — DISCHARGE NOTE PROVIDER - CARE PROVIDER_API CALL
Anushka Medeiros)  Cardiac Electrophysiology; Cardiovascular Disease; Internal Medicine  [address] [place] [address]  Phone: [phone]  Fax: [phone]  Follow Up Time:     Primary Care Doctor,   Phone: (   )    -  Fax: (   )    -  Follow Up Time:

## 2022-01-16 NOTE — DISCHARGE NOTE PROVIDER - NSDCCPCAREPLAN_GEN_ALL_CORE_FT
PRINCIPAL DISCHARGE DIAGNOSIS  Diagnosis: Alcohol dependence with withdrawal  Assessment and Plan of Treatment: - take medications as rx  - stop alcohol   - follow up with pcp and cardiology

## 2022-01-17 ENCOUNTER — TRANSCRIPTION ENCOUNTER (OUTPATIENT)
Age: 62
End: 2022-01-17

## 2022-01-17 VITALS
SYSTOLIC BLOOD PRESSURE: 147 MMHG | DIASTOLIC BLOOD PRESSURE: 66 MMHG | OXYGEN SATURATION: 95 % | RESPIRATION RATE: 16 BRPM | TEMPERATURE: 98 F | HEART RATE: 78 BPM

## 2022-01-17 LAB — SARS-COV-2 RNA SPEC QL NAA+PROBE: SIGNIFICANT CHANGE UP

## 2022-01-17 PROCEDURE — 85610 PROTHROMBIN TIME: CPT

## 2022-01-17 PROCEDURE — U0005: CPT

## 2022-01-17 PROCEDURE — 96374 THER/PROPH/DIAG INJ IV PUSH: CPT

## 2022-01-17 PROCEDURE — 99285 EMERGENCY DEPT VISIT HI MDM: CPT

## 2022-01-17 PROCEDURE — 99239 HOSP IP/OBS DSCHRG MGMT >30: CPT

## 2022-01-17 PROCEDURE — 85027 COMPLETE CBC AUTOMATED: CPT

## 2022-01-17 PROCEDURE — 84484 ASSAY OF TROPONIN QUANT: CPT

## 2022-01-17 PROCEDURE — 85025 COMPLETE CBC W/AUTO DIFF WBC: CPT

## 2022-01-17 PROCEDURE — 80053 COMPREHEN METABOLIC PANEL: CPT

## 2022-01-17 PROCEDURE — 84100 ASSAY OF PHOSPHORUS: CPT

## 2022-01-17 PROCEDURE — 80048 BASIC METABOLIC PNL TOTAL CA: CPT

## 2022-01-17 PROCEDURE — 83735 ASSAY OF MAGNESIUM: CPT

## 2022-01-17 PROCEDURE — 36415 COLL VENOUS BLD VENIPUNCTURE: CPT

## 2022-01-17 PROCEDURE — U0003: CPT

## 2022-01-17 PROCEDURE — 93005 ELECTROCARDIOGRAM TRACING: CPT

## 2022-01-17 PROCEDURE — 85730 THROMBOPLASTIN TIME PARTIAL: CPT

## 2022-01-17 PROCEDURE — 71045 X-RAY EXAM CHEST 1 VIEW: CPT

## 2022-01-17 RX ADMIN — Medication 2 MILLIGRAM(S): at 10:45

## 2022-01-17 RX ADMIN — Medication 2 MILLIGRAM(S): at 05:33

## 2022-01-17 RX ADMIN — Medication 1 MILLIGRAM(S): at 10:45

## 2022-01-17 RX ADMIN — SERTRALINE 25 MILLIGRAM(S): 25 TABLET, FILM COATED ORAL at 05:34

## 2022-01-17 RX ADMIN — Medication 1 PATCH: at 10:45

## 2022-01-17 RX ADMIN — Medication 100 MILLIGRAM(S): at 05:35

## 2022-01-17 RX ADMIN — Medication 75 MILLIGRAM(S): at 05:34

## 2022-01-17 RX ADMIN — Medication 1 PATCH: at 09:35

## 2022-01-17 RX ADMIN — Medication 75 MILLIGRAM(S): at 13:15

## 2022-01-17 RX ADMIN — SACUBITRIL AND VALSARTAN 1 TABLET(S): 24; 26 TABLET, FILM COATED ORAL at 05:34

## 2022-01-17 RX ADMIN — Medication 1 TABLET(S): at 10:50

## 2022-01-17 RX ADMIN — Medication 2 MILLIGRAM(S): at 13:15

## 2022-01-17 RX ADMIN — Medication 2 MILLIGRAM(S): at 13:16

## 2022-01-17 RX ADMIN — Medication 0.5 MILLIGRAM(S): at 05:34

## 2022-01-17 RX ADMIN — APIXABAN 5 MILLIGRAM(S): 2.5 TABLET, FILM COATED ORAL at 05:34

## 2022-01-17 RX ADMIN — Medication 100 MILLIGRAM(S): at 10:44

## 2022-01-17 RX ADMIN — Medication 40 MILLIGRAM(S): at 05:35

## 2022-01-17 NOTE — DISCHARGE NOTE NURSING/CASE MANAGEMENT/SOCIAL WORK - NSDCPEFALRISK_GEN_ALL_CORE
For information on Fall & Injury Prevention, visit: [email or web address] OR  [email or web address] OR  https://www.cdc.gov/steadi/patient.html

## 2022-01-17 NOTE — CHART NOTE - NSCHARTNOTEFT_GEN_A_CORE
patient is medically optimized at this time from as having chronic chf (not on inexacerbation) and chronic afib. both conditions controlled on medications

## 2022-01-17 NOTE — DISCHARGE NOTE NURSING/CASE MANAGEMENT/SOCIAL WORK - NSDCPEEMAIL_GEN_ALL_CORE
[facility] for Tobacco Control email [email or web address]

## 2022-01-17 NOTE — DISCHARGE NOTE NURSING/CASE MANAGEMENT/SOCIAL WORK - PATIENT PORTAL LINK FT
You can access the FollowMyHealth Patient Portal offered by [facility] by registering at the following website: [email or web address]. By joining "[employer]’s FollowMyHealth portal, you will also be able to view your health information using other applications (apps) compatible with our system.

## 2022-01-17 NOTE — PROGRESS NOTE ADULT - SUBJECTIVE AND OBJECTIVE BOX
Patient is a 61y old  Male who presents with a chief complaint of etoh abuse/ accelerated htn (15 Jose Carlos 2022 09:01)    Patient seen and examined at bedside.     ALLERGIES:  No Known Allergies    MEDICATIONS  (STANDING):  amLODIPine   Tablet 10 milliGRAM(s) Oral daily  apixaban 5 milliGRAM(s) Oral every 12 hours  atorvastatin 20 milliGRAM(s) Oral at bedtime  clonazePAM  Tablet 0.5 milliGRAM(s) Oral every 12 hours  folic acid 1 milliGRAM(s) Oral daily  furosemide    Tablet 40 milliGRAM(s) Oral daily  hydrALAZINE 75 milliGRAM(s) Oral three times a day  metoprolol succinate  milliGRAM(s) Oral daily  multivitamin 1 Tablet(s) Oral daily  nicotine  Polacrilex Gum 2 milliGRAM(s) Oral every 4 hours  nicotine - 21 mG/24Hr(s) Patch 1 patch Transdermal daily  sacubitril 49 mG/valsartan 51 mG 1 Tablet(s) Oral two times a day  sertraline 25 milliGRAM(s) Oral two times a day  thiamine 100 milliGRAM(s) Oral daily    MEDICATIONS  (PRN):  LORazepam     Tablet 2 milliGRAM(s) Oral every 2 hours PRN Symptom-triggered 2 point increase in CIWA-Ar    Vital Signs Last 24 Hrs  T(F): 98.3 (16 Jan 2022 07:59), Max: 98.3 (16 Jan 2022 07:59)  HR: 72 (16 Jan 2022 07:59) (63 - 86)  BP: 131/86 (16 Jan 2022 07:59) (131/86 - 145/95)  RR: 20 (16 Jan 2022 07:59) (18 - 20)  SpO2: 98% (16 Jan 2022 07:59) (93% - 98%)  I&O's Summary    PHYSICAL EXAM:  General: NAD, A/O x 3  ENT: MMM, no thrush  Neck: Supple, No JVD  Lungs: Clear to auscultation bilaterally, good air entry, non-labored breathing  Cardio: +s1/s2  Abdomen: Soft, Nontender, Nondistended; Bowel sounds present  Extremities: No calf tenderness, No pitting edema      LABS:                        16.1   8.79  )-----------( 262      ( 15 Jose Carlos 2022 03:05 )             49.1     01-16    139  |  102  |  25.4  ----------------------------<  164  4.0   |  24.0  |  1.15    Ca    8.9      16 Jan 2022 08:52  Phos  3.5     01-15  Mg     1.9     01-15    eGFR if Non African American: 68 mL/min/1.73M2 (01-16-22 @ 08:52)  eGFR if : 79 mL/min/1.73M2 (01-16-22 @ 08:52)    COVID-19 PCR: NotDetec (01-12-22 @ 16:34)    RADIOLOGY & ADDITIONAL TESTS:  - no new tests
Patient is a 61y old  Male who presents with a chief complaint of etoh abuse/ accelerated htn (16 Jan 2022 11:42)    Patient seen and examined at bedside.     ALLERGIES:  No Known Allergies    MEDICATIONS  (STANDING):  amLODIPine   Tablet 10 milliGRAM(s) Oral daily  apixaban 5 milliGRAM(s) Oral every 12 hours  atorvastatin 20 milliGRAM(s) Oral at bedtime  clonazePAM  Tablet 0.5 milliGRAM(s) Oral every 12 hours  folic acid 1 milliGRAM(s) Oral daily  furosemide    Tablet 40 milliGRAM(s) Oral daily  hydrALAZINE 75 milliGRAM(s) Oral three times a day  metoprolol succinate  milliGRAM(s) Oral daily  multivitamin 1 Tablet(s) Oral daily  nicotine  Polacrilex Gum 2 milliGRAM(s) Oral every 4 hours  nicotine - 21 mG/24Hr(s) Patch 1 patch Transdermal daily  sacubitril 49 mG/valsartan 51 mG 1 Tablet(s) Oral two times a day  sertraline 25 milliGRAM(s) Oral two times a day  thiamine 100 milliGRAM(s) Oral daily    MEDICATIONS  (PRN):  LORazepam     Tablet 2 milliGRAM(s) Oral every 2 hours PRN Symptom-triggered 2 point increase in CIWA-Ar    Vital Signs Last 24 Hrs  T(F): 97.6 (17 Jan 2022 09:33), Max: 98.5 (17 Jan 2022 05:21)  HR: 77 (17 Jan 2022 09:33) (66 - 77)  BP: 121/68 (17 Jan 2022 09:33) (121/68 - 150/88)  RR: 18 (17 Jan 2022 09:33) (18 - 20)  SpO2: 97% (17 Jan 2022 09:33) (97% - 98%)  I&O's Summary    PHYSICAL EXAM:  General: NAD, A/O x 3  ENT: MMM, no thrush  Neck: Supple, No JVD  Lungs: Clear to auscultation bilaterally, good air entry, non-labored breathing  Cardio: +s1/s2  Abdomen: Soft, Nontender, Nondistended; Bowel sounds present  Extremities: No calf tenderness, No pitting edema    LABS:                        16.1   8.79  )-----------( 262      ( 15 Jose Carlos 2022 03:05 )             49.1     01-16    139  |  102  |  25.4  ----------------------------<  164  4.0   |  24.0  |  1.15    Ca    8.9      16 Jan 2022 08:52  Phos  3.5     01-15  Mg     1.9     01-15    eGFR if Non African American: 68 mL/min/1.73M2 (01-16-22 @ 08:52)  eGFR if : 79 mL/min/1.73M2 (01-16-22 @ 08:52)    COVID-19 PCR: NotDetec (01-12-22 @ 16:34)    RADIOLOGY & ADDITIONAL TESTS:  - no new tests  
Chief complaint: EtOH abuse    Patient seen and examined at bedside. No acute overnight events reported. Patient doing well, denies headache, fever, chills, cough, chest pain, shortness of breath, nausea or vomiting.     Vital Signs Last 24 Hrs  T(F): 97.9 (14 Jan 2022 09:01), Max: 98.4 (13 Jan 2022 19:57)  HR: 65 (14 Jan 2022 09:01) (62 - 82)  BP: 149/88 (14 Jan 2022 09:01) (149/88 - 176/93)  RR: 18 (14 Jan 2022 09:01) (18 - 20)  SpO2: 96% (14 Jan 2022 09:01) (96% - 99%)    Physical Exam:  Constitutional: alert and oriented, in no acute distress   Neck: Soft and supple  Respiratory: Clear to auscultation bilaterally, no wheezes or crackles  Cardiovascular: Regular rate and rhyhtm, no murmurs, gallops, rubs  Gastrointestinal: Soft, non-tender to palpation, +bs  Vascular: 2+ peripheral pulses  Neurological: A/O x 3, no focal neurological deficits  Musculoskeletal: 5/5 strength b/l upper and lower extremities, no lower extremity edema bilaterally    Labs:                        16.2   7.29  )-----------( 262      ( 14 Jan 2022 07:12 )             47.7   01-14    142  |  102  |  19.2  ----------------------------<  116<H>  3.5   |  26.0  |  1.07    Ca    9.1      14 Jan 2022 07:12  Phos  2.6     01-12  Mg     2.0     01-12    TPro  6.8  /  Alb  4.1  /  TBili  0.4  /  DBili  x   /  AST  28  /  ALT  15  /  AlkPhos  52  01-12  
Patient is a 61y old  Male who presents with a chief complaint of etoh abuse/ accelerated htn (14 Jan 2022 09:31)    Patient seen and examined at bedside.     ALLERGIES:  No Known Allergies    MEDICATIONS  (STANDING):  amLODIPine   Tablet 10 milliGRAM(s) Oral daily  apixaban 5 milliGRAM(s) Oral every 12 hours  atorvastatin 20 milliGRAM(s) Oral at bedtime  folic acid 1 milliGRAM(s) Oral daily  furosemide    Tablet 40 milliGRAM(s) Oral daily  hydrALAZINE 75 milliGRAM(s) Oral three times a day  LORazepam   Injectable   IV Push   LORazepam   Injectable 1 milliGRAM(s) IV Push every 4 hours  LORazepam   Injectable 0.5 milliGRAM(s) IV Push every 4 hours  metoprolol succinate  milliGRAM(s) Oral daily  multivitamin 1 Tablet(s) Oral daily  nicotine  Polacrilex Gum 2 milliGRAM(s) Oral every 4 hours  nicotine - 21 mG/24Hr(s) Patch 1 patch Transdermal daily  sacubitril 49 mG/valsartan 51 mG 1 Tablet(s) Oral two times a day  sertraline 25 milliGRAM(s) Oral two times a day  thiamine 100 milliGRAM(s) Oral daily    MEDICATIONS  (PRN):  LORazepam   Injectable 1 milliGRAM(s) IV Push every 1 hour PRN CIWA-Ar score 8 or greater    Vital Signs Last 24 Hrs  T(F): 97.9 (15 Jose Carlos 2022 05:00), Max: 98.2 (14 Jan 2022 14:48)  HR: 78 (15 Jose Carlos 2022 05:00) (65 - 80)  BP: 154/85 (15 Jose Carlos 2022 05:00) (153/95 - 187/113)  RR: 18 (15 Jose Carlos 2022 05:00) (18 - 18)  SpO2: 98% (15 Jose Carlos 2022 05:00) (96% - 98%)  I&O's Summary    PHYSICAL EXAM:  General: NAD, A/O x 3  ENT: MMM, no thrush  Neck: Supple, No JVD  Lungs: Clear to auscultation bilaterally, good air entry, non-labored breathing  Cardio: +s1/s2  Abdomen: Soft, Nontender, Nondistended; Bowel sounds present  Extremities: No calf tenderness, No pitting edema    LABS:                        16.1   8.79  )-----------( 262      ( 15 Jose Carlos 2022 03:05 )             49.1     01-15    140  |  98  |  32.1  ----------------------------<  106  5.0   |  28.0  |  1.09    Ca    10.2      15 Jose Carlos 2022 03:05  Phos  2.6     01-12  Mg     2.0     01-12    TPro  6.8  /  Alb  4.1  /  TBili  0.4  /  DBili  x   /  AST  28  /  ALT  15  /  AlkPhos  52  01-12    eGFR if Non African American: 72 mL/min/1.73M2 (01-15-22 @ 03:05)  eGFR if African American: 84 mL/min/1.73M2 (01-15-22 @ 03:05)    PT/INR - ( 12 Jan 2022 17:21 )   PT: 11.4 sec;   INR: 0.98 ratio    PTT - ( 12 Jan 2022 17:21 )  PTT:24.9 sec    COVID-19 PCR: NotDetec (01-12-22 @ 16:34)    RADIOLOGY & ADDITIONAL TESTS:  < from: Xray Chest 1 View- PORTABLE-Urgent (01.12.22 @ 17:28) >  IMPRESSION: No acute cardiopulmonary disease process. Cardiomegaly.  < end of copied text >  
Chief complaint: EtoH abuse    Patient seen and examined at bedside. No acute overnight events reported. No headache, fever, chills, cough, chest pain, shortness of breath, abdominal pain, nausea or vomiting.     Vital Signs Last 24 Hrs  T(F): 97.7 (13 Jan 2022 08:11), Max: 98.9 (12 Jan 2022 19:20)  HR: 73 (13 Jan 2022 08:11) (65 - 98)  BP: 179/106 (13 Jan 2022 08:11) (141/85 - 195/102)  RR: 20 (13 Jan 2022 08:11) (18 - 20)  SpO2: 99% (13 Jan 2022 08:11) (97% - 99%)    Physical Exam:  Constitutional: alert and oriented, in no acute distress   Neck: Soft and supple  Respiratory: Clear to auscultation bilaterally, no wheezes or crackles  Cardiovascular: Regular rate and rhyhtm, no murmurs, gallops, rubs  Gastrointestinal: Soft, non-tender to palpation, +bs  Vascular: 2+ peripheral pulses  Neurological: A/O x 3, no focal neurological deficits  Musculoskeletal: 5/5 strength b/l upper and lower extremities, no lower extremity edema bilaterally    Labs:                        14.1   6.98  )-----------( 259      ( 12 Jan 2022 16:34 )             42.1   01-13    141  |  104  |  23.4<H>  ----------------------------<  129<H>  3.5   |  22.0  |  1.13    Ca    8.0<L>      13 Jan 2022 03:26  Phos  2.6     01-12  Mg     2.0     01-12    TPro  6.8  /  Alb  4.1  /  TBili  0.4  /  DBili  x   /  AST  28  /  ALT  15  /  AlkPhos  52  01-12

## 2022-01-17 NOTE — PROGRESS NOTE ADULT - ASSESSMENT
62 y/o M with h/o EtOH abuse admitted for EtOH intoxication impending withdrawal.     #EtOH abuse   - on [facility] protocol  - w/ probable thiamine defc- thiamine   - folic acid, multivitamin    #HTN- Essential   - metoprolol, hydralyzine, norvasc  - monitor blood pressure     #Chronic Diastolic Heart Failure   - echo 6/2021 lvef 60%  - entresto, lasix, metoprolol    #HLD  - staitn     #Afib  - metoprolol and eliquis     #DVT Prophylaxis  - eliquis     pending d/c via sw

## 2022-01-17 NOTE — PROGRESS NOTE ADULT - REASON FOR ADMISSION
etoh abuse/ accelerated htn

## 2022-01-17 NOTE — DISCHARGE NOTE NURSING/CASE MANAGEMENT/SOCIAL WORK - NSDCPEWEB_GEN_ALL_CORE
[facility] for Tobacco Control website --- [email or web address] website --- [email or web address]

## 2022-02-08 ENCOUNTER — EMERGENCY (EMERGENCY)
Facility: HOSPITAL | Age: 62
LOS: 1 days | Discharge: HOME | End: 2022-02-08
Attending: EMERGENCY MEDICINE
Payer: MEDICARE

## 2022-02-08 VITALS — HEIGHT: 67 IN

## 2022-02-08 LAB
FLUAV AG NPH QL: SIGNIFICANT CHANGE UP
FLUBV AG NPH QL: SIGNIFICANT CHANGE UP
RSV RNA NPH QL NAA+NON-PROBE: SIGNIFICANT CHANGE UP
SARS-COV-2 RNA SPEC QL NAA+PROBE: DETECTED

## 2022-02-08 PROCEDURE — 99284 EMERGENCY DEPT VISIT MOD MDM: CPT | Mod: CS,25

## 2022-02-08 PROCEDURE — 93010 ELECTROCARDIOGRAM REPORT: CPT

## 2022-02-08 NOTE — ED PROVIDER NOTE - OBJECTIVE STATEMENT
60 y/o male with PMHx of a-fib, CHF, alcohol abuse states he was about to go to the shelter and then he had the syncopal episode. Pt states he is "very depressed" as his "life has gone to shit". Pt states his roommate got an order of protection, is now homeless, states he can no longer pursue his passion. Pt states he was drinking 2 quarts of vodka a day, has not drank in 2 days. Pt endorses compliance with a-fib medication. Pt denies suicidality.     denies fever. denies HA or neck pain. no chest pain or sob. no abd pain. no n/v/d. no urinary f/u/d. no back pain. no motor or sensory deficits. denies illicit drug use. no recent travel. no rash. no other acute issues symptoms or concerns

## 2022-02-08 NOTE — ED PROVIDER NOTE - NEUROLOGICAL, MLM
neuro: CN II - XII intact, EOMI, PERRL, no papilledema, 5/5 muscle strength x 4 extremities, no sensory deficits, 2+ dtr globally, negative babinski, no ataxic gait, normal SARI and FNT, normal romberg

## 2022-02-08 NOTE — ED PROVIDER NOTE - PATIENT PORTAL LINK FT
You can access the FollowMyHealth Patient Portal offered by [facility] by registering at the following website: [email or web address]. By joining [employer]’[facility] FollowMyHealth portal, you will also be able to view your health information using other applications (apps) compatible with our system.

## 2022-02-08 NOTE — ED PROVIDER NOTE - PSYCHIATRIC, MLM
Alert and oriented to person, place, time/situation. endorses depression, denies suicidality no apparent risk to self or others.

## 2022-02-08 NOTE — ED PROVIDER NOTE - NSICDXPASTMEDICALHX_GEN_ALL_CORE_FT
PAST MEDICAL HISTORY:  Atrial fibrillation     CHF (congestive heart failure)     Depression, unspecified depression type     HTN (hypertension)     Hyperlipidemia     Pericardial effusion     Pulmonary hypertension

## 2022-02-08 NOTE — ED ADULT NURSE REASSESSMENT NOTE - NS ED NURSE REASSESS COMMENT FT1
pt a+ox3, sitting comfortably in bed, offers no complaints at this time. provided sandwich and PO fluids, tolerating without any difficulty. will continue to monitor.

## 2022-02-08 NOTE — ED PROVIDER NOTE - PROGRESS NOTE DETAILS
Nikita Harrison for ED attending, Dr. Marie: 1:1 cancelled, pt is just depressed after being suddenly homeless, no suicidal thoughts or intent asking for a shelter, will obtain covid test. stable overnight covid + , AM SW for housing

## 2022-02-09 VITALS — HEART RATE: 70 BPM | DIASTOLIC BLOOD PRESSURE: 85 MMHG | TEMPERATURE: 98 F | SYSTOLIC BLOOD PRESSURE: 150 MMHG

## 2022-02-09 PROBLEM — I31.3 PERICARDIAL EFFUSION (NONINFLAMMATORY): Chronic | Status: ACTIVE | Noted: 2022-01-12

## 2022-02-09 PROCEDURE — G0378: CPT

## 2022-02-09 PROCEDURE — 93005 ELECTROCARDIOGRAM TRACING: CPT

## 2022-02-09 PROCEDURE — 99285 EMERGENCY DEPT VISIT HI MDM: CPT

## 2022-02-09 PROCEDURE — 87637 SARSCOV2&INF A&B&RSV AMP PRB: CPT

## 2022-02-09 PROCEDURE — 99218: CPT | Mod: CS,25

## 2022-02-09 RX ORDER — ACETAMINOPHEN 500 MG
650 TABLET ORAL ONCE
Refills: 0 | Status: COMPLETED | OUTPATIENT
Start: 2022-02-09 | End: 2022-02-09

## 2022-02-09 NOTE — ED CDU PROVIDER DISPOSITION NOTE - NSFOLLOWUPINSTRUCTIONS_ED_ALL_ED_FT
1. Return to ED for worsening, progressive or any other concerning symptoms   2. Follow up with your primary care doctor in 2-3days   3. [facility]  [address] [place] [address]  [phone]

## 2022-02-09 NOTE — ED ADULT NURSE REASSESSMENT NOTE - NS ED NURSE REASSESS COMMENT FT1
Patient alert and oriented, resting comfortably and in no distress.  Awaiting SW for housing/shelter placement.

## 2022-02-09 NOTE — CHART NOTE - NSCHARTNOTEFT_GEN_A_CORE
NKECHI made aware patient is medically stable for discharge and will need assistance with housing. NKECHI placed call to [facility] housing and spoke with employee, Essence. NKECHI provided Essence with patient name and she is currently working on placement. Essence is aware patient has tested positive for covid 19 and will need appropriate placement where the patient can isolate. NKECHI continues to follow for safe discharge planning.

## 2022-02-09 NOTE — ED CDU PROVIDER DISPOSITION NOTE - PATIENT PORTAL LINK FT
You can access the FollowMyHealth Patient Portal offered by [facility] by registering at the following website: [email or web address]. By joining [email or web address]’s FollowMyHealth portal, you will also be able to view your health information using other applications (apps) compatible with our system. therapist

## 2022-02-09 NOTE — CHART NOTE - NSCHARTNOTEFT_GEN_A_CORE
As per MD, patient is medically stable for discharge. Patient is requesting assistance with [facility] placement. SW placed call to [facility] Emergency housing services ([phone]) on behalf of patient. [facility] employee, Brittany, followed up with SW to provide placement. Brittany reported patient can attend [address] [address]. SW requested for patient's ambulette to take the patient to covid positive placement- patient requires ambulette for isolation precautions. Patient in agreement with discharge plan. RN aware of discharge plan. SW remains available as needed.

## 2022-02-09 NOTE — ED CDU PROVIDER DISPOSITION NOTE - CLINICAL COURSE
patient brought in for syncope, ekg normal, intox/SI that resolved, denies SI, psychiatricially cleared by Dr. Marie, patient to go to [facility] due to COVID status

## 2022-03-07 ENCOUNTER — INPATIENT (INPATIENT)
Facility: HOSPITAL | Age: 62
LOS: 2 days | Discharge: HOME | DRG: 291 | End: 2022-03-10
Attending: INTERNAL MEDICINE | Admitting: HOSPITALIST
Payer: MEDICARE

## 2022-03-07 VITALS
TEMPERATURE: 99 F | RESPIRATION RATE: 20 BRPM | OXYGEN SATURATION: 96 % | SYSTOLIC BLOOD PRESSURE: 188 MMHG | HEART RATE: 94 BPM | WEIGHT: 210.1 LBS | DIASTOLIC BLOOD PRESSURE: 105 MMHG | HEIGHT: 67 IN

## 2022-03-07 DIAGNOSIS — I50.9 HEART FAILURE, UNSPECIFIED: ICD-10-CM

## 2022-03-07 DIAGNOSIS — I48.0 PAROXYSMAL ATRIAL FIBRILLATION: ICD-10-CM

## 2022-03-07 DIAGNOSIS — E78.5 HYPERLIPIDEMIA, UNSPECIFIED: ICD-10-CM

## 2022-03-07 DIAGNOSIS — I10 ESSENTIAL (PRIMARY) HYPERTENSION: ICD-10-CM

## 2022-03-07 LAB
ALBUMIN SERPL ELPH-MCNC: 4 G/DL — SIGNIFICANT CHANGE UP (ref 3.3–5.2)
ALBUMIN SERPL ELPH-MCNC: 4.1 G/DL — SIGNIFICANT CHANGE UP (ref 3.3–5.2)
ALP SERPL-CCNC: 49 U/L — SIGNIFICANT CHANGE UP (ref 40–120)
ALP SERPL-CCNC: 53 U/L — SIGNIFICANT CHANGE UP (ref 40–120)
ALT FLD-CCNC: 16 U/L — SIGNIFICANT CHANGE UP
ALT FLD-CCNC: 18 U/L — SIGNIFICANT CHANGE UP
AMPHET UR-MCNC: NEGATIVE — SIGNIFICANT CHANGE UP
ANION GAP SERPL CALC-SCNC: 13 MMOL/L — SIGNIFICANT CHANGE UP (ref 5–17)
APAP SERPL-MCNC: 12.6 UG/ML — SIGNIFICANT CHANGE UP (ref 10–26)
APPEARANCE UR: CLEAR — SIGNIFICANT CHANGE UP
APTT BLD: 28.2 SEC — SIGNIFICANT CHANGE UP (ref 27.5–35.5)
AST SERPL-CCNC: 19 U/L — SIGNIFICANT CHANGE UP
AST SERPL-CCNC: 20 U/L — SIGNIFICANT CHANGE UP
BACTERIA # UR AUTO: ABNORMAL
BARBITURATES UR SCN-MCNC: NEGATIVE — SIGNIFICANT CHANGE UP
BASOPHILS # BLD AUTO: 0.06 K/UL — SIGNIFICANT CHANGE UP (ref 0–0.2)
BASOPHILS NFR BLD AUTO: 0.7 % — SIGNIFICANT CHANGE UP (ref 0–2)
BENZODIAZ UR-MCNC: NEGATIVE — SIGNIFICANT CHANGE UP
BILIRUB DIRECT SERPL-MCNC: 0.1 MG/DL — SIGNIFICANT CHANGE UP (ref 0–0.3)
BILIRUB INDIRECT FLD-MCNC: SIGNIFICANT CHANGE UP MG/DL (ref 0.2–1)
BILIRUB SERPL-MCNC: 0.2 MG/DL — LOW (ref 0.4–2)
BILIRUB SERPL-MCNC: <0.2 MG/DL — LOW (ref 0.4–2)
BILIRUB UR-MCNC: NEGATIVE — SIGNIFICANT CHANGE UP
BUN SERPL-MCNC: 21.6 MG/DL — HIGH (ref 8–20)
CALCIUM SERPL-MCNC: 8.6 MG/DL — SIGNIFICANT CHANGE UP (ref 8.6–10.2)
CHLORIDE SERPL-SCNC: 102 MMOL/L — SIGNIFICANT CHANGE UP (ref 98–107)
CO2 SERPL-SCNC: 24 MMOL/L — SIGNIFICANT CHANGE UP (ref 22–29)
COCAINE METAB.OTHER UR-MCNC: NEGATIVE — SIGNIFICANT CHANGE UP
COLOR SPEC: YELLOW — SIGNIFICANT CHANGE UP
CREAT SERPL-MCNC: 1.17 MG/DL — SIGNIFICANT CHANGE UP (ref 0.5–1.3)
DIFF PNL FLD: NEGATIVE — SIGNIFICANT CHANGE UP
EGFR: 70 ML/MIN/1.73M2 — SIGNIFICANT CHANGE UP
EOSINOPHIL # BLD AUTO: 0.1 K/UL — SIGNIFICANT CHANGE UP (ref 0–0.5)
EOSINOPHIL NFR BLD AUTO: 1.1 % — SIGNIFICANT CHANGE UP (ref 0–6)
EPI CELLS # UR: SIGNIFICANT CHANGE UP
ETHANOL SERPL-MCNC: <10 MG/DL — SIGNIFICANT CHANGE UP (ref 0–9)
GLUCOSE SERPL-MCNC: 112 MG/DL — HIGH (ref 70–99)
GLUCOSE UR QL: NEGATIVE MG/DL — SIGNIFICANT CHANGE UP
HCT VFR BLD CALC: 40.2 % — SIGNIFICANT CHANGE UP (ref 39–50)
HGB BLD-MCNC: 13.5 G/DL — SIGNIFICANT CHANGE UP (ref 13–17)
IMM GRANULOCYTES NFR BLD AUTO: 0.7 % — SIGNIFICANT CHANGE UP (ref 0–1.5)
INR BLD: 1.05 RATIO — SIGNIFICANT CHANGE UP (ref 0.88–1.16)
KETONES UR-MCNC: NEGATIVE — SIGNIFICANT CHANGE UP
LEUKOCYTE ESTERASE UR-ACNC: NEGATIVE — SIGNIFICANT CHANGE UP
LYMPHOCYTES # BLD AUTO: 1 K/UL — SIGNIFICANT CHANGE UP (ref 1–3.3)
LYMPHOCYTES # BLD AUTO: 11.2 % — LOW (ref 13–44)
MAGNESIUM SERPL-MCNC: 1.8 MG/DL — SIGNIFICANT CHANGE UP (ref 1.6–2.6)
MAGNESIUM SERPL-MCNC: 1.8 MG/DL — SIGNIFICANT CHANGE UP (ref 1.8–2.6)
MCHC RBC-ENTMCNC: 29.2 PG — SIGNIFICANT CHANGE UP (ref 27–34)
MCHC RBC-ENTMCNC: 33.6 GM/DL — SIGNIFICANT CHANGE UP (ref 32–36)
MCV RBC AUTO: 86.8 FL — SIGNIFICANT CHANGE UP (ref 80–100)
METHADONE UR-MCNC: NEGATIVE — SIGNIFICANT CHANGE UP
MONOCYTES # BLD AUTO: 0.61 K/UL — SIGNIFICANT CHANGE UP (ref 0–0.9)
MONOCYTES NFR BLD AUTO: 6.8 % — SIGNIFICANT CHANGE UP (ref 2–14)
NEUTROPHILS # BLD AUTO: 7.12 K/UL — SIGNIFICANT CHANGE UP (ref 1.8–7.4)
NEUTROPHILS NFR BLD AUTO: 79.5 % — HIGH (ref 43–77)
NITRITE UR-MCNC: NEGATIVE — SIGNIFICANT CHANGE UP
NT-PROBNP SERPL-SCNC: 1192 PG/ML — HIGH (ref 0–300)
OPIATES UR-MCNC: NEGATIVE — SIGNIFICANT CHANGE UP
PCP SPEC-MCNC: SIGNIFICANT CHANGE UP
PCP UR-MCNC: NEGATIVE — SIGNIFICANT CHANGE UP
PH UR: 6 — SIGNIFICANT CHANGE UP (ref 5–8)
PHOSPHATE SERPL-MCNC: 3.5 MG/DL — SIGNIFICANT CHANGE UP (ref 2.4–4.7)
PLATELET # BLD AUTO: 228 K/UL — SIGNIFICANT CHANGE UP (ref 150–400)
POTASSIUM SERPL-MCNC: 3.9 MMOL/L — SIGNIFICANT CHANGE UP (ref 3.5–5.3)
POTASSIUM SERPL-SCNC: 3.9 MMOL/L — SIGNIFICANT CHANGE UP (ref 3.5–5.3)
PROT SERPL-MCNC: 6.6 G/DL — SIGNIFICANT CHANGE UP (ref 6.6–8.7)
PROT SERPL-MCNC: 6.6 G/DL — SIGNIFICANT CHANGE UP (ref 6.6–8.7)
PROT UR-MCNC: 15
PROTHROM AB SERPL-ACNC: 12.2 SEC — SIGNIFICANT CHANGE UP (ref 10.5–13.4)
RAPID RVP RESULT: SIGNIFICANT CHANGE UP
RBC # BLD: 4.63 M/UL — SIGNIFICANT CHANGE UP (ref 4.2–5.8)
RBC # FLD: 13.5 % — SIGNIFICANT CHANGE UP (ref 10.3–14.5)
RBC CASTS # UR COMP ASSIST: SIGNIFICANT CHANGE UP /HPF (ref 0–4)
SALICYLATES SERPL-MCNC: <0.6 MG/DL — LOW (ref 10–20)
SARS-COV-2 RNA SPEC QL NAA+PROBE: SIGNIFICANT CHANGE UP
SODIUM SERPL-SCNC: 138 MMOL/L — SIGNIFICANT CHANGE UP (ref 135–145)
SP GR SPEC: 1.02 — SIGNIFICANT CHANGE UP (ref 1.01–1.02)
T4 AB SER-ACNC: 4.2 UG/DL — LOW (ref 4.5–12)
THC UR QL: NEGATIVE — SIGNIFICANT CHANGE UP
TROPONIN T SERPL-MCNC: <0.01 NG/ML — SIGNIFICANT CHANGE UP (ref 0–0.06)
TSH SERPL-MCNC: 1.86 UIU/ML — SIGNIFICANT CHANGE UP (ref 0.27–4.2)
UROBILINOGEN FLD QL: NEGATIVE MG/DL — SIGNIFICANT CHANGE UP
WBC # BLD: 8.95 K/UL — SIGNIFICANT CHANGE UP (ref 3.8–10.5)
WBC # FLD AUTO: 8.95 K/UL — SIGNIFICANT CHANGE UP (ref 3.8–10.5)
WBC UR QL: SIGNIFICANT CHANGE UP /HPF (ref 0–5)

## 2022-03-07 PROCEDURE — 71045 X-RAY EXAM CHEST 1 VIEW: CPT | Mod: 26

## 2022-03-07 PROCEDURE — 74176 CT ABD & PELVIS W/O CONTRAST: CPT | Mod: 26

## 2022-03-07 PROCEDURE — 93010 ELECTROCARDIOGRAM REPORT: CPT

## 2022-03-07 PROCEDURE — 99285 EMERGENCY DEPT VISIT HI MDM: CPT

## 2022-03-07 PROCEDURE — 99223 1ST HOSP IP/OBS HIGH 75: CPT

## 2022-03-07 RX ORDER — LANOLIN ALCOHOL/MO/W.PET/CERES
3 CREAM (GRAM) TOPICAL AT BEDTIME
Refills: 0 | Status: DISCONTINUED | OUTPATIENT
Start: 2022-03-07 | End: 2022-03-10

## 2022-03-07 RX ORDER — ACETAMINOPHEN 500 MG
1000 TABLET ORAL ONCE
Refills: 0 | Status: COMPLETED | OUTPATIENT
Start: 2022-03-07 | End: 2022-03-07

## 2022-03-07 RX ORDER — METOPROLOL TARTRATE 50 MG
100 TABLET ORAL ONCE
Refills: 0 | Status: COMPLETED | OUTPATIENT
Start: 2022-03-07 | End: 2022-03-07

## 2022-03-07 RX ORDER — FUROSEMIDE 40 MG
40 TABLET ORAL DAILY
Refills: 0 | Status: DISCONTINUED | OUTPATIENT
Start: 2022-03-08 | End: 2022-03-08

## 2022-03-07 RX ORDER — CLONAZEPAM 1 MG
0.5 TABLET ORAL
Refills: 0 | Status: DISCONTINUED | OUTPATIENT
Start: 2022-03-07 | End: 2022-03-09

## 2022-03-07 RX ORDER — APIXABAN 2.5 MG/1
5 TABLET, FILM COATED ORAL EVERY 12 HOURS
Refills: 0 | Status: DISCONTINUED | OUTPATIENT
Start: 2022-03-07 | End: 2022-03-10

## 2022-03-07 RX ORDER — THIAMINE MONONITRATE (VIT B1) 100 MG
100 TABLET ORAL DAILY
Refills: 0 | Status: DISCONTINUED | OUTPATIENT
Start: 2022-03-07 | End: 2022-03-10

## 2022-03-07 RX ORDER — METOPROLOL TARTRATE 50 MG
200 TABLET ORAL DAILY
Refills: 0 | Status: DISCONTINUED | OUTPATIENT
Start: 2022-03-07 | End: 2022-03-09

## 2022-03-07 RX ORDER — ATORVASTATIN CALCIUM 80 MG/1
20 TABLET, FILM COATED ORAL AT BEDTIME
Refills: 0 | Status: DISCONTINUED | OUTPATIENT
Start: 2022-03-07 | End: 2022-03-10

## 2022-03-07 RX ORDER — FUROSEMIDE 40 MG
40 TABLET ORAL ONCE
Refills: 0 | Status: COMPLETED | OUTPATIENT
Start: 2022-03-07 | End: 2022-03-07

## 2022-03-07 RX ORDER — HYDRALAZINE HCL 50 MG
75 TABLET ORAL THREE TIMES A DAY
Refills: 0 | Status: DISCONTINUED | OUTPATIENT
Start: 2022-03-07 | End: 2022-03-08

## 2022-03-07 RX ORDER — SERTRALINE 25 MG/1
25 TABLET, FILM COATED ORAL
Refills: 0 | Status: DISCONTINUED | OUTPATIENT
Start: 2022-03-07 | End: 2022-03-10

## 2022-03-07 RX ORDER — NICOTINE POLACRILEX 2 MG
1 GUM BUCCAL DAILY
Refills: 0 | Status: DISCONTINUED | OUTPATIENT
Start: 2022-03-07 | End: 2022-03-10

## 2022-03-07 RX ORDER — ACETAMINOPHEN 500 MG
650 TABLET ORAL EVERY 6 HOURS
Refills: 0 | Status: DISCONTINUED | OUTPATIENT
Start: 2022-03-07 | End: 2022-03-10

## 2022-03-07 RX ORDER — ONDANSETRON 8 MG/1
4 TABLET, FILM COATED ORAL EVERY 8 HOURS
Refills: 0 | Status: DISCONTINUED | OUTPATIENT
Start: 2022-03-07 | End: 2022-03-10

## 2022-03-07 RX ORDER — AMLODIPINE BESYLATE 2.5 MG/1
10 TABLET ORAL DAILY
Refills: 0 | Status: DISCONTINUED | OUTPATIENT
Start: 2022-03-07 | End: 2022-03-10

## 2022-03-07 RX ORDER — ONDANSETRON 8 MG/1
4 TABLET, FILM COATED ORAL ONCE
Refills: 0 | Status: COMPLETED | OUTPATIENT
Start: 2022-03-07 | End: 2022-03-07

## 2022-03-07 RX ORDER — FOLIC ACID 0.8 MG
1 TABLET ORAL DAILY
Refills: 0 | Status: DISCONTINUED | OUTPATIENT
Start: 2022-03-07 | End: 2022-03-10

## 2022-03-07 RX ORDER — SACUBITRIL AND VALSARTAN 24; 26 MG/1; MG/1
1 TABLET, FILM COATED ORAL
Refills: 0 | Status: DISCONTINUED | OUTPATIENT
Start: 2022-03-07 | End: 2022-03-10

## 2022-03-07 RX ADMIN — Medication 0.5 MILLIGRAM(S): at 22:39

## 2022-03-07 RX ADMIN — ONDANSETRON 4 MILLIGRAM(S): 8 TABLET, FILM COATED ORAL at 16:28

## 2022-03-07 RX ADMIN — Medication 1000 MILLIGRAM(S): at 18:13

## 2022-03-07 RX ADMIN — Medication 400 MILLIGRAM(S): at 16:26

## 2022-03-07 RX ADMIN — Medication 75 MILLIGRAM(S): at 22:02

## 2022-03-07 RX ADMIN — Medication 100 MILLIGRAM(S): at 16:27

## 2022-03-07 RX ADMIN — ATORVASTATIN CALCIUM 20 MILLIGRAM(S): 80 TABLET, FILM COATED ORAL at 22:01

## 2022-03-07 RX ADMIN — Medication 50 MILLIGRAM(S): at 23:30

## 2022-03-07 RX ADMIN — Medication 40 MILLIGRAM(S): at 16:27

## 2022-03-07 NOTE — CONSULT NOTE ADULT - PROBLEM SELECTOR RECOMMENDATION 2
Low Na diet, optimize for BP goals based on JNC8  - resume Metoprolol, Lasix, Norvasc, Entresto and Hydralazine  - monitor urine output

## 2022-03-07 NOTE — CONSULT NOTE ADULT - PROBLEM SELECTOR RECOMMENDATION 3
Low cholesterol diet, check FLP in AM  - resume Statin therapy and monitor daily LFTs  - daily exercise an optimal weight management

## 2022-03-07 NOTE — ED PROVIDER NOTE - CLINICAL SUMMARY MEDICAL DECISION MAKING FREE TEXT BOX
Patient is a 60 yo male with PMhx HTN, HLD, a fib on eliquis and metoprolol, CHF on Lasix, alcohol abuse presenting with palpitations and exertional dyspnea. Patient states his last drink was 3 hours ago; he was at a detox center 1 month ago but was released after he got COVID. Patient states approximately 3 hours ago he developed palpitations and fluttering, sweating, as well as nausea. He has had 2 days of cough with yellow sputum as well as congestion. He has had acute on chronic exertional dyspnea for several weeks. Bilateral crackles, 1+ pitting edema, increasing abdominal distension. Lasix and metoprolol given for SOB and palpitations c/f HF; CXR, BNP to r/o HF. ECG WNL, troponin to r/o ACS. UDS, BAL, acetaminophen and blood salicylate levels to r/o alternate drug use. cbc, cmp coags, TSH to r/o electrolyte abnormalities. CT a/p to assess ascites for liver disease. Will continue to monitor.

## 2022-03-07 NOTE — ED PROVIDER NOTE - PHYSICAL EXAMINATION
Constitutional: Well appearing, awake, alert, oriented to person, place, and time/situation and in no apparent distress  ENMT: Airway patent nasal mucosa clear. Mouth with normal mucosa. Throat has no vesicles no oropharyngeal exudates and uvula is midline. No blood in the oropharynx  EYES: clear bilaterally, pupils equal, round and reactive to light  Cardiac: Regular rate, regular rhythm. Heart sounds S1, S2. No murmurs, rubs or gallops. Good capillary refill, 2+ pulses, 1+ pitting edema  Respiratory: bilateral crackles, no use of accessory muscles, no crackles, satting 96% on RA in no distress  Gastrointestinal: Abdomen distended with ascites, non-tender, no rebound guarding or peritoneal signs  Genitourinary: No CVA tenderness, pelvis nontender, bladder nondistended  Musculoskeletal: Spine appears normal, range of motion is not limited, no muscle or joint tenderness  Neurological: Alert and oriented, no focal deficits, no motor or sensory deficits. CN 2-12 intact, PERRLA, EOMI, No FND, moving all extremities equally, sensation intact, No dysmetria, no ataxia, negative heel-shin, 5/5 strength   Skin: Skin normal color for race, warm, dry and intact, No evidence of rash

## 2022-03-07 NOTE — H&P ADULT - ASSESSMENT
62 y/o male with PMH of HTN, HLD, afib on Eliquis, CHFpEF, EtOH abuse came to the ED complaining of palpitation and difficulty breathing. Patient noted associated dizziness, "funny feeling in my chest"; said the symptoms were the same as when he got rapid afib. Patient noted orthopnea and nausea. He has not been taken his medication for few weeks because he ran out. In the ED, elevated BP, Lasix given; cardiology consulted     Acute on chronic CHFpEF due to medication non-compliance   Admit to telemetry   Lasix 40mg given in the ED, will continue   Continue Entresto 49-51mg bid   Daily weight   Medication compliance re-enforced   Cardiology consulted     Hypertensive urgency  Amlodipine 10mg   Metoprolol ER 200mg with holding parameters   Hydralazine 75mg tid     EtOH abuse   Patient with high risk of alcohol withdrawal   Will start on CIWA protocol   MVT   Folic acid  Thiamine for possible thiamine deficiency due to EtOH use   Aspiration/withdrawal precaution   Cessation advised     Afib   Eliquis 5mg bid   Metoprolol ER 200mg     HLD  Atorvastatin 20mg     Tobacco abuse   Nicotine patch ordered   Cessation advised     Supportive   DVT prophylaxis: Eliquis   Diet: DASH     Plan of care discussed with patient

## 2022-03-07 NOTE — ED PROVIDER NOTE - OBJECTIVE STATEMENT
Patient is a 62 yo male with PMhx HTN, HLD, a fib on eliquis and metoprolol, CHF on Lasix, alcohol abuse presenting with palpitations and exertional dyspnea. Patient states his last drink was 3 hours ago; he was at a detox center 1 month ago but was released after he got COVID. Patient states approximately 3 hours ago he developed palpitations and fluttering, sweating, as well as nausea. Denies SI/HI/hallucinations. He has had 2 days of cough with yellow sputum as well as congestion. He has had acute on chronic exertional dyspnea for several weeks. Patient has not been taking his eliquis, metoprolol, or lasix. Denies trauma, falls, fevers, chills, dizziness, lightheadedness, dysphagia, dysarthria, diplopia, photophobia, syncope, CP, abdominal pain, neck pain, back pain, diarrhea, dysuria, hematuria, hematochezia, hematemesis, vomiting, recent travel, sick contacts, leg swelling.

## 2022-03-07 NOTE — CONSULT NOTE ADULT - SUBJECTIVE AND OBJECTIVE BOX
[facility]                                              CARDIOLOGY AT [place] [address]                                             Telephone: [phone]. Fax:[phone]                                                       CARDIOLOGY CONSULTATION NOTE                                                                                             History obtained by: Patient and medical record  Community Cardiologist: Dr. Gatica   obtained: Yes [  ] No [X]  Reason for Consultation:   Available out pt records reviewed: Yes [  ] No [  ]    Chief complaint:  Palpitations and dyspnea     HPI:      CARDIAC TESTING   ECHO:    STRESS:    CATH:     ELECTROPHYSIOLOGY:     PAST MEDICAL HISTORY  HTN (hypertension)  CHF (congestive heart failure)  Atrial fibrillation  Depression, unspecified depression type  Pulmonary hypertension  Hyperlipidemia  Pericardial effusion    PAST SURGICAL HISTORY  No significant past surgical history    SOCIAL HISTORY:  Denies smoking/alcohol/drugs    FAMILY HISTORY:    Family History of Cardiovascular Disease:  Yes [  ] No [  ]  Coronary Artery Disease in first degree relative: Yes [  ] No [  ]  Sudden Cardiac Death in First degree relative: Yes [  ] No [  ]    HOME MEDICATIONS:  amLODIPine 10 mg oral tablet: 1 tab(s) orally once a day (2021 11:43)  atorvastatin 20 mg oral tablet: 1 tab(s) orally once a day (2021 11:43)  clonazePAM 0.5 mg oral tablet: 1 tab(s) orally 2 times a day (2021 11:43)  Entresto 49 mg-51 mg oral tablet: 1 tab(s) orally 2 times a day (2021 11:43)  furosemide 40 mg oral tablet: 1 tab(s) orally once a day (2021 11:43)  hydrALAZINE 25 mg oral tablet: 3 tab (75 mG) orally 3 times a day (2021 11:43)  metoprolol succinate 100 mg oral tablet, extended release: 2 tab(s) orally once a day (2021 11:43)  sertraline 25 mg oral tablet: 1 tab(s) orally 2 times a day (2021 11:43)    CURRENT CARDIAC MEDICATIONS:  amLODIPine   Tablet 10 milliGRAM(s) Oral daily  hydrALAZINE 75 milliGRAM(s) Oral three times a day  metoprolol succinate  milliGRAM(s) Oral daily  sacubitril 49 mG/valsartan 51 mG 1 Tablet(s) Oral two times a day    CURRENT OTHER MEDICATIONS:  acetaminophen     Tablet .. 650 milliGRAM(s) Oral every 6 hours PRN Temp greater or equal to 38C (100.4F), Mild Pain (1 - 3)  chlordiazePOXIDE 50 milliGRAM(s) Oral every 6 hours, Stop order after: 4 Doses  chlordiazePOXIDE   Oral   clonazePAM  Tablet 0.5 milliGRAM(s) Oral two times a day  LORazepam   Injectable 2 milliGRAM(s) IV Push every 1 hour PRN Symptom-triggered: each CIWA -Ar score 8 or GREATER  melatonin 3 milliGRAM(s) Oral at bedtime PRN Insomnia  ondansetron Injectable 4 milliGRAM(s) IV Push every 8 hours PRN Nausea and/or Vomiting  sertraline 25 milliGRAM(s) Oral two times a day  aluminum hydroxide/magnesium hydroxide/simethicone Suspension 30 milliLiter(s) Oral every 4 hours PRN Dyspepsia  apixaban 5 milliGRAM(s) Oral every 12 hours  atorvastatin 20 milliGRAM(s) Oral at bedtime  folic acid 1 milliGRAM(s) Oral daily  multivitamin 1 Tablet(s) Oral daily  nicotine - 21 mG/24Hr(s) Patch 1 patch Transdermal daily  thiamine 100 milliGRAM(s) Oral daily    ALLERGIES: No Known Allergies    REVIEW OF SYMPTOMS:   CONSTITUTIONAL: No fever, no chills, no weight loss, no weight gain, no fatigue   ENMT:  No vertigo; No sinus or throat pain  NECK: No pain or stiffness  CARDIOVASCULAR: No chest pain, no dyspnea, no syncope/presyncope, no palpitations, no dizziness, no Orthopnea, no Paroxsymal nocturnal dyspnea  RESPIRATORY: no Shortness of breath, no cough, no wheezing  : No dysuria, no hematuria   GI: No dark color stool, no nausea, no diarrhea, no constipation, no abdominal pain   NEURO: No headache, no slurred speech   MUSCULOSKELETAL: No joint pain or swelling; No muscle, back, or extremity pain  PSYCH: No agitation, no anxiety.    ALL OTHER REVIEW OF SYSTEMS ARE NEGATIVE.    VITAL SIGNS:  T(C): 37.1 (22 @ 15:26), Max: 37.1 (22 @ 15:26)  T(F): 98.7 (22 @ 15:26), Max: 98.7 (03-07-22 @ 15:26)  HR: 94 (22 @ 15:26) (94 - 94)  BP: 188/105 (22 @ 15:26) (188/105 - 188/105)  RR: 20 (22 @ 15:26) (20 - 20)  SpO2: 96% (22 @ 15:26) (96% - 96%)    INTAKE AND OUTPUT:     PHYSICAL EXAM:  Constitutional: Comfortable . No acute distress.   HEENT: Atraumatic and normocephalic , neck is supple . no JVD. No carotid bruit.  CNS: A&Ox3. No focal deficits.   Respiratory: CTAB, unlabored   Cardiovascular: RRR normal s1 s2. No murmur. No rubs or gallop.  Gastrointestinal: Soft, non-tender. +Bowel sounds.   Extremities: 2+ Peripheral Pulses, No clubbing, cyanosis, or edema  Psychiatric: Calm . no agitation.   Skin: Warm and dry, no ulcers on extremities     LABS:  ( 07 Mar 2022 15:54 )  Troponin T  <0.01,  CPK  X    , CKMB  X    , BNP 1192<H>                         13.5   8.95  )-----------( 228      ( 07 Mar 2022 15:54 )             40.2     03-07    138  |  102  |  21.6<H>  ----------------------------<  112<H>  3.9   |  24.0  |  1.17    Ca    8.6      07 Mar 2022 15:54  Phos  3.5     03-07  Mg     1.8     03-07    TPro  6.6  /  Alb  4.1  /  TBili  <0.2<L>  /  DBili  0.1  /  AST  19  /  ALT  16  /  AlkPhos  53  03-07    PT: 12.2 sec;   INR: 1.05 ratio   PTT:28.2 sec    Urinalysis Basic - ( 07 Mar 2022 17:27 )  Color: Yellow / Appearance: Clear / S.020 / pH: x  Gluc: x / Ketone: Negative  / Bili: Negative / Urobili: Negative mg/dL   Blood: x / Protein: 15 / Nitrite: Negative   Leuk Esterase: Negative / RBC: 0-2 /HPF / WBC 0-2 /HPF   Sq Epi: x / Non Sq Epi: Occasional / Bacteria: Occasional    Thyroid Stimulating Hormone, Serum: 1.86 uIU/mL (22 @ 15:54)    INTERPRETATION OF TELEMETRY:   ECG:   Prior ECG: Yes [  ] No [  ]    RADIOLOGY & ADDITIONAL STUDIES:   X-ray:    CT scan:                                                   [facility]                                              CARDIOLOGY AT [place] [address]                                             Telephone: [phone]. Fax:[phone]                                                       CARDIOLOGY CONSULTATION NOTE                                                                                             History obtained by: Patient and medical record  Community Cardiologist: Dr. Gatica   obtained: Yes [  ] No [X]  Reason for Consultation:   Available out pt records reviewed: Yes [  ] No [  ]    Chief complaint:  Palpitations and dyspnea     HPI:      CARDIAC TESTING   ECHO: Summary:   1. Left ventricular ejection fraction, by visual estimation, is 60 to 65%.   2. Normal global left ventricular systolic function.   3. There is mild concentric left ventricular hypertrophy.   4. Normal RV size with mildly reduced RV systolic function, estimated PASP least 46 mmHg mildly elevated..   5. Moderately enlarged left atrium.   6. Mild-moderate tricuspid regurgitation.   7. Trivial pericardial effusion (less than 0.5 cm) in limited subcostal view.   8. Compared to the prior TTE studies from 21, no significant residual pericardial effusion.    Austin Oh DO Electronically signed on 2021    STRESS:    CATH:     ELECTROPHYSIOLOGY:     PAST MEDICAL HISTORY  HTN (hypertension)  CHF (congestive heart failure)  Atrial fibrillation  Depression, unspecified depression type  Pulmonary hypertension  Hyperlipidemia  Pericardial effusion    PAST SURGICAL HISTORY  No significant past surgical history    SOCIAL HISTORY:  Denies smoking/alcohol/drugs    FAMILY HISTORY:    Family History of Cardiovascular Disease:  Yes [  ] No [  ]  Coronary Artery Disease in first degree relative: Yes [  ] No [  ]  Sudden Cardiac Death in First degree relative: Yes [  ] No [  ]    HOME MEDICATIONS:  amLODIPine 10 mg oral tablet: 1 tab(s) orally once a day (2021 11:43)  atorvastatin 20 mg oral tablet: 1 tab(s) orally once a day (2021 11:43)  clonazePAM 0.5 mg oral tablet: 1 tab(s) orally 2 times a day (2021 11:43)  Entresto 49 mg-51 mg oral tablet: 1 tab(s) orally 2 times a day (2021 11:43)  furosemide 40 mg oral tablet: 1 tab(s) orally once a day (2021 11:43)  hydrALAZINE 25 mg oral tablet: 3 tab (75 mG) orally 3 times a day (2021 11:43)  metoprolol succinate 100 mg oral tablet, extended release: 2 tab(s) orally once a day (2021 11:43)  sertraline 25 mg oral tablet: 1 tab(s) orally 2 times a day (2021 11:43)    CURRENT CARDIAC MEDICATIONS:  amLODIPine   Tablet 10 milliGRAM(s) Oral daily  hydrALAZINE 75 milliGRAM(s) Oral three times a day  metoprolol succinate  milliGRAM(s) Oral daily  sacubitril 49 mG/valsartan 51 mG 1 Tablet(s) Oral two times a day    CURRENT OTHER MEDICATIONS:  acetaminophen     Tablet .. 650 milliGRAM(s) Oral every 6 hours PRN Temp greater or equal to 38C (100.4F), Mild Pain (1 - 3)  chlordiazePOXIDE 50 milliGRAM(s) Oral every 6 hours, Stop order after: 4 Doses  chlordiazePOXIDE   Oral   clonazePAM  Tablet 0.5 milliGRAM(s) Oral two times a day  LORazepam   Injectable 2 milliGRAM(s) IV Push every 1 hour PRN Symptom-triggered: each CIWA -Ar score 8 or GREATER  melatonin 3 milliGRAM(s) Oral at bedtime PRN Insomnia  ondansetron Injectable 4 milliGRAM(s) IV Push every 8 hours PRN Nausea and/or Vomiting  sertraline 25 milliGRAM(s) Oral two times a day  aluminum hydroxide/magnesium hydroxide/simethicone Suspension 30 milliLiter(s) Oral every 4 hours PRN Dyspepsia  apixaban 5 milliGRAM(s) Oral every 12 hours  atorvastatin 20 milliGRAM(s) Oral at bedtime  folic acid 1 milliGRAM(s) Oral daily  multivitamin 1 Tablet(s) Oral daily  nicotine - 21 mG/24Hr(s) Patch 1 patch Transdermal daily  thiamine 100 milliGRAM(s) Oral daily    ALLERGIES: No Known Allergies    REVIEW OF SYMPTOMS:   CONSTITUTIONAL: No fever, no chills, no weight loss, no weight gain, no fatigue   ENMT:  No vertigo; No sinus or throat pain  NECK: No pain or stiffness  CARDIOVASCULAR: No chest pain, no dyspnea, no syncope/presyncope, no palpitations, no dizziness, no Orthopnea, no Paroxsymal nocturnal dyspnea  RESPIRATORY: no Shortness of breath, no cough, no wheezing  : No dysuria, no hematuria   GI: No dark color stool, no nausea, no diarrhea, no constipation, no abdominal pain   NEURO: No headache, no slurred speech   MUSCULOSKELETAL: No joint pain or swelling; No muscle, back, or extremity pain  PSYCH: No agitation, no anxiety.    ALL OTHER REVIEW OF SYSTEMS ARE NEGATIVE.    VITAL SIGNS:  T(C): 37.1 (22 @ 15:26), Max: 37.1 (22 @ 15:26)  T(F): 98.7 (22 @ 15:26), Max: 98.7 (22 @ 15:26)  HR: 94 (22 @ 15:26) (94 - 94)  BP: 188/105 (22 @ 15:26) (188/105 - 188/105)  RR: 20 (22 @ 15:26) (20 - 20)  SpO2: 96% (22 @ 15:26) (96% - 96%)    INTAKE AND OUTPUT:     PHYSICAL EXAM:  Constitutional: Comfortable . No acute distress.   HEENT: Atraumatic and normocephalic , neck is supple . no JVD. No carotid bruit.  CNS: A&Ox3. No focal deficits.   Respiratory: CTAB, unlabored   Cardiovascular: RRR normal s1 s2. No murmur. No rubs or gallop.  Gastrointestinal: Soft, non-tender. +Bowel sounds.   Extremities: 2+ Peripheral Pulses, No clubbing, cyanosis, or edema  Psychiatric: Calm . no agitation.   Skin: Warm and dry, no ulcers on extremities     LABS:  ( 07 Mar 2022 15:54 )  Troponin T  <0.01,  CPK  X    , CKMB  X    , BNP 1192<H>                         13.5   8.95  )-----------( 228      ( 07 Mar 2022 15:54 )             40.2     03-07    138  |  102  |  21.6<H>  ----------------------------<  112<H>  3.9   |  24.0  |  1.17    Ca    8.6      07 Mar 2022 15:54  Phos  3.5     03-07  Mg     1.8     03-07    TPro  6.6  /  Alb  4.1  /  TBili  <0.2<L>  /  DBili  0.1  /  AST  19  /  ALT  16  /  AlkPhos  53      PT: 12.2 sec;   INR: 1.05 ratio   PTT:28.2 sec    Urinalysis Basic - ( 07 Mar 2022 17:27 )  Color: Yellow / Appearance: Clear / S.020 / pH: x  Gluc: x / Ketone: Negative  / Bili: Negative / Urobili: Negative mg/dL   Blood: x / Protein: 15 / Nitrite: Negative   Leuk Esterase: Negative / RBC: 0-2 /HPF / WBC 0-2 /HPF   Sq Epi: x / Non Sq Epi: Occasional / Bacteria: Occasional    Thyroid Stimulating Hormone, Serum: 1.86 uIU/mL (22 @ 15:54)    INTERPRETATION OF TELEMETRY:   ECG:   Prior ECG: Yes [  ] No [  ]    RADIOLOGY & ADDITIONAL STUDIES:   X-ray:  INTERPRETATION:  AP chest on 2022 at 4:26 PM. Patient has palpitations.  Heart enlargement again noted. Lungs remain clear. Chest is similar to  of this year.    IMPRESSION: Heart enlargement again noted.    JACKSON SANTIAGO MD; Attending Radiologist  This document has been electronically signed. Mar  7 2022      CT scan: PROCEDURE: CT of the Abdomen and Pelvis was performed. Sagittal and coronal reformats were performed.    FINDINGS: LOWER CHEST: Clear.  LIVER: Normal.  BILE DUCTS: Nondilated.  GALLBLADDER: Normal.  SPLEEN: Normal.  PANCREAS: Normal.  ADRENALS: Normal.  KIDNEYS/URETERS: No hydronephrosis or urinary tract calculi.  BLADDER: Diffuse wall thickening.  REPRODUCTIVE ORGANS: Enlarged prostate.  BOWEL: No bowel-related abnormality. Specifically, no evidence of acute   diverticulitis. Normal appendix and ileocecal region. No bowel   obstruction or bowel inflammation.  PERITONEUM: No free air or ascites.  VESSELS: Aortoiliac atherosclerosis without aneurysm.  RETROPERITONEUM/LYMPH NODES: No adenopathy.  ABDOMINAL WALL: Normal.  BONES: No acute bony abnormality.    IMPRESSION: No acute pathology.  No ascites.    SONG VO MD; Attending Radiologist  This document has been electronically signed. Mar  7 2022                                                  [facility]                                              CARDIOLOGY AT [address], [place] [address]                                             Telephone: [phone]. Fax:[phone]                                                       CARDIOLOGY CONSULTATION NOTE                                                                                             History obtained by: Patient and medical record  Community Cardiologist: Dr. Gatica   obtained: Yes [  ] No [X]  Reason for Consultation:   Available out pt records reviewed: Yes [  ] No [  ]    Chief complaint:  Palpitations and dyspnea     HPI:  Patient is a 62yo M well known to our service, w/ PMHx HTN, HLD, paroxysmal A- fib on Eliquis, diastolic HF, ETOH abuse admitted after patient developed  palpitations and exertional dyspnea over the weekend.  Patient reports not taking his daily medications "because I ran out of them and could not renew them on time."  Also states he's been drinking over the weekend as well and was just at a local Detox center a month ago.  He was released because his COVID test was positive at the time.  Since then living in a group home and has "just many issues going on."  Patient says he developed noticeable palpitations and "my heart was fluttering" and had sweating yesterday.  He also had some orthopnea and couldn't sleep by lying down.  And felt like congested.  His symptoms got worse and so he came in for evaluation.  His exertional dyspnea was present for a few weeks now.  Off his usual heart medication (Eliquis, Metoprolol and Lasix.)  Currently sitting up in bed and denies chest pain, syncope, fevers, chills, dizziness, lightheadedness, abdominal pain, neck pain, leg pain, dysuria, hematuria or vomiting.  His roommate just passed away overnight.  No sick contacts reported.  Had Covid vaccine last year.     CARDIAC TESTING   ECHO: Summary:   1. Left ventricular ejection fraction, by visual estimation, is 60 to 65%.   2. Normal global left ventricular systolic function.   3. There is mild concentric left ventricular hypertrophy.   4. Normal RV size with mildly reduced RV systolic function, estimated PASP least 46 mmHg mildly elevated..   5. Moderately enlarged left atrium.   6. Mild-moderate tricuspid regurgitation.   7. Trivial pericardial effusion (less than 0.5 cm) in limited subcostal view.   8. Compared to the prior TTE studies from 21, no significant residual pericardial effusion.    Austin Oh DO Electronically signed on 2021    PAST MEDICAL HISTORY  HTN (hypertension)  CHF (congestive heart failure)  Atrial fibrillation  Depression, unspecified depression type  Pulmonary hypertension  Hyperlipidemia  Pericardial effusion    PAST SURGICAL HISTORY  No significant past surgical history    SOCIAL HISTORY:  + smoking/ + alcohol/ denies drugs    FAMILY HISTORY: None reported    Family History of Cardiovascular Disease:  Yes [X] No [  ]  Coronary Artery Disease in first degree relative: Yes [X] No [  ]  Sudden Cardiac Death in First degree relative: Yes [  ] No [X]    HOME MEDICATIONS:  amLODIPine 10 mg oral tablet: 1 tab(s) orally once a day (2021 11:43)  atorvastatin 20 mg oral tablet: 1 tab(s) orally once a day (2021 11:43)  clonazePAM 0.5 mg oral tablet: 1 tab(s) orally 2 times a day (2021 11:43)  Entresto 49 mg-51 mg oral tablet: 1 tab(s) orally 2 times a day (2021 11:43)  furosemide 40 mg oral tablet: 1 tab(s) orally once a day (2021 11:43)  hydrALAZINE 25 mg oral tablet: 3 tab (75 mG) orally 3 times a day (2021 11:43)  metoprolol succinate 100 mg oral tablet, extended release: 2 tab(s) orally once a day (2021 11:43)  sertraline 25 mg oral tablet: 1 tab(s) orally 2 times a day (2021 11:43)    CURRENT CARDIAC MEDICATIONS:  amLODIPine   Tablet 10 milliGRAM(s) Oral daily  hydrALAZINE 75 milliGRAM(s) Oral three times a day  metoprolol succinate  milliGRAM(s) Oral daily  sacubitril 49 mG/valsartan 51 mG 1 Tablet(s) Oral two times a day    CURRENT OTHER MEDICATIONS:  acetaminophen     Tablet .. 650 milliGRAM(s) Oral every 6 hours PRN Temp greater or equal to 38C (100.4F), Mild Pain (1 - 3)  chlordiazePOXIDE 50 milliGRAM(s) Oral every 6 hours, Stop order after: 4 Doses  chlordiazePOXIDE   Oral   clonazePAM  Tablet 0.5 milliGRAM(s) Oral two times a day  LORazepam   Injectable 2 milliGRAM(s) IV Push every 1 hour PRN Symptom-triggered: each CIWA -Ar score 8 or GREATER  melatonin 3 milliGRAM(s) Oral at bedtime PRN Insomnia  ondansetron Injectable 4 milliGRAM(s) IV Push every 8 hours PRN Nausea and/or Vomiting  sertraline 25 milliGRAM(s) Oral two times a day  aluminum hydroxide/magnesium hydroxide/simethicone Suspension 30 milliLiter(s) Oral every 4 hours PRN Dyspepsia  apixaban 5 milliGRAM(s) Oral every 12 hours  atorvastatin 20 milliGRAM(s) Oral at bedtime  folic acid 1 milliGRAM(s) Oral daily  multivitamin 1 Tablet(s) Oral daily  nicotine - 21 mG/24Hr(s) Patch 1 patch Transdermal daily  thiamine 100 milliGRAM(s) Oral daily    ALLERGIES: No Known Allergies    REVIEW OF SYMPTOMS:   CONSTITUTIONAL: No fever, no chills, no weight loss, + weight gain, + fatigue   ENMT: No vertigo  NECK: No stiffness  CARDIOVASCULAR: No chest pain, + dyspnea, no syncope/presyncope, + palpitations, no dizziness, + Orthopnea, no Paroxsymal nocturnal dyspnea  RESPIRATORY: + Shortness of breath, no cough, no wheezing  GI: No dark color stool, no nausea, no diarrhea, no constipation, no abdominal pain   NEURO: No headache, no slurred speech   MUSCULOSKELETAL: No joint pain or swelling; No muscle, back, or extremity pain  PSYCH: No agitation, + anxiety    ALL OTHER REVIEW OF SYSTEMS ARE NEGATIVE.    VITAL SIGNS:  T(C): 37.1 (22 @ 15:26), Max: 37.1 (22 @ 15:26)  T(F): 98.7 (22 @ 15:26), Max: 98.7 (22 @ 15:26)  HR: 94 (22 @ 15:26) (94 - 94)  BP: 188/105 (22 @ 15:26) (188/105 - 188/105)  RR: 20 (22 @ 15:26) (20 - 20)  SpO2: 96% (22 @ 15:26) (96% - 96%)    INTAKE AND OUTPUT:     PHYSICAL EXAM:  Constitutional: Comfortable, no acute distress   HEENT: Atraumatic, neck is supple, no JVD  CNS: A&Ox3. No focal motor or sensory deficits   Respiratory: + fine crackles at the bases B/L 1/3 up, no wheezing   Cardiovascular: RRR, normal S1S2, no murmur  Gastrointestinal: Soft, non-tender, +Bowel sounds   Extremities: 2+ Peripheral Pulses, no cyanosis, + 1+ B/L LE pitting edema  Psychiatric: Calm   Skin: Warm and dry, pale     LABS:  ( 07 Mar 2022 15:54 )  Troponin T  <0.01,  CPK  X    , CKMB  X    , BNP 1192<H>                         13.5   8.95  )-----------( 228      ( 07 Mar 2022 15:54 )             40.2     03-07    138  |  102  |  21.6<H>  ----------------------------<  112<H>  3.9   |  24.0  |  1.17    Ca    8.6      07 Mar 2022 15:54  Phos  3.5     03-07  Mg     1.8     03-07    TPro  6.6  /  Alb  4.1  /  TBili  <0.2<L>  /  DBili  0.1  /  AST  19  /  ALT  16  /  AlkPhos  53  03-07    PT: 12.2 sec;   INR: 1.05 ratio   PTT:28.2 sec    Urinalysis Basic - ( 07 Mar 2022 17:27 )  Color: Yellow / Appearance: Clear / S.020 / pH: x  Gluc: x / Ketone: Negative  / Bili: Negative / Urobili: Negative mg/dL   Blood: x / Protein: 15 / Nitrite: Negative   Leuk Esterase: Negative / RBC: 0-2 /HPF / WBC 0-2 /HPF   Sq Epi: x / Non Sq Epi: Occasional / Bacteria: Occasional    Thyroid Stimulating Hormone, Serum: 1.86 uIU/mL (22 @ 15:54)    INTERPRETATION OF TELEMETRY:   ECG:   Prior ECG: Yes [  ] No [  ]    RADIOLOGY & ADDITIONAL STUDIES:   X-ray:  INTERPRETATION:  AP chest on 2022 at 4:26 PM. Patient has palpitations.  Heart enlargement again noted. Lungs remain clear. Chest is similar to  of this year.    IMPRESSION: Heart enlargement again noted.    JACKSON SANTIAGO MD; Attending Radiologist  This document has been electronically signed. Mar  7 2022      CT scan: PROCEDURE: CT of the Abdomen and Pelvis was performed. Sagittal and coronal reformats were performed.    FINDINGS: LOWER CHEST: Clear.  LIVER: Normal.  BILE DUCTS: Nondilated.  GALLBLADDER: Normal.  SPLEEN: Normal.  PANCREAS: Normal.  ADRENALS: Normal.  KIDNEYS/URETERS: No hydronephrosis or urinary tract calculi.  BLADDER: Diffuse wall thickening.  REPRODUCTIVE ORGANS: Enlarged prostate.  BOWEL: No bowel-related abnormality. Specifically, no evidence of acute   diverticulitis. Normal appendix and ileocecal region. No bowel   obstruction or bowel inflammation.  PERITONEUM: No free air or ascites.  VESSELS: Aortoiliac atherosclerosis without aneurysm.  RETROPERITONEUM/LYMPH NODES: No adenopathy.  ABDOMINAL WALL: Normal.  BONES: No acute bony abnormality.    IMPRESSION: No acute pathology.  No ascites.    SONG VO MD; Attending Radiologist  This document has been electronically signed. Mar  7 2022                                                  [facility]                                              CARDIOLOGY AT [address], [place] [address]                                             Telephone: [phone]. Fax:[phone]                                                       CARDIOLOGY CONSULTATION NOTE                                                                                             History obtained by: Patient and medical record  Community Cardiologist: Dr. Gatica   obtained: Yes [  ] No [X]  Reason for Consultation:   Available out pt records reviewed: Yes [  ] No [  ]    Chief complaint:  Palpitations and dyspnea     HPI:  Patient is a 62yo M well known to our service, w/ PMHx HTN, HLD, paroxysmal A- fib on Eliquis, diastolic HF, ETOH abuse admitted after patient developed  palpitations and exertional dyspnea over the weekend.  Patient reports not taking his daily medications "because I ran out of them and could not renew them on time."  Also states he's been drinking over the weekend as well and was just at a local Detox center a month ago.  He was released because his COVID test was positive at the time.  Since then living in a group home and has "just many issues going on."  Patient says he developed noticeable palpitations and "my heart was fluttering" and had sweating yesterday.  He also had some orthopnea and couldn't sleep by lying down.  And felt like congested.  His symptoms got worse and so he came in for evaluation.  His exertional dyspnea was present for a few weeks now.  Off his usual heart medication (Eliquis, Metoprolol and Lasix.)  Currently sitting up in bed and denies chest pain, syncope, fevers, chills, dizziness, lightheadedness, abdominal pain, neck pain, leg pain, dysuria, hematuria or vomiting.  His roommate just passed away overnight.  No sick contacts reported.  Had Covid vaccine last year.     CARDIAC TESTING   ECHO: Summary:   1. Left ventricular ejection fraction, by visual estimation, is 60 to 65%.   2. Normal global left ventricular systolic function.   3. There is mild concentric left ventricular hypertrophy.   4. Normal RV size with mildly reduced RV systolic function, estimated PASP least 46 mmHg mildly elevated..   5. Moderately enlarged left atrium.   6. Mild-moderate tricuspid regurgitation.   7. Trivial pericardial effusion (less than 0.5 cm) in limited subcostal view.   8. Compared to the prior TTE studies from 21, no significant residual pericardial effusion.    Austin Oh DO Electronically signed on 2021    PAST MEDICAL HISTORY  HTN (hypertension)  CHF (congestive heart failure)  Atrial fibrillation  Depression, unspecified depression type  Pulmonary hypertension  Hyperlipidemia  Pericardial effusion    PAST SURGICAL HISTORY  No significant past surgical history    SOCIAL HISTORY:  + smoking/ + alcohol/ denies drugs    FAMILY HISTORY: None reported    Family History of Cardiovascular Disease:  Yes [X] No [  ]  Coronary Artery Disease in first degree relative: Yes [X] No [  ]  Sudden Cardiac Death in First degree relative: Yes [  ] No [X]    HOME MEDICATIONS:  amLODIPine 10 mg oral tablet: 1 tab(s) orally once a day (2021 11:43)  atorvastatin 20 mg oral tablet: 1 tab(s) orally once a day (2021 11:43)  clonazePAM 0.5 mg oral tablet: 1 tab(s) orally 2 times a day (2021 11:43)  Entresto 49 mg-51 mg oral tablet: 1 tab(s) orally 2 times a day (2021 11:43)  furosemide 40 mg oral tablet: 1 tab(s) orally once a day (2021 11:43)  hydrALAZINE 25 mg oral tablet: 3 tab (75 mG) orally 3 times a day (2021 11:43)  metoprolol succinate 100 mg oral tablet, extended release: 2 tab(s) orally once a day (2021 11:43)  sertraline 25 mg oral tablet: 1 tab(s) orally 2 times a day (2021 11:43)    CURRENT CARDIAC MEDICATIONS:  amLODIPine   Tablet 10 milliGRAM(s) Oral daily  hydrALAZINE 75 milliGRAM(s) Oral three times a day  metoprolol succinate  milliGRAM(s) Oral daily  sacubitril 49 mG/valsartan 51 mG 1 Tablet(s) Oral two times a day    CURRENT OTHER MEDICATIONS:  acetaminophen     Tablet .. 650 milliGRAM(s) Oral every 6 hours PRN Temp greater or equal to 38C (100.4F), Mild Pain (1 - 3)  chlordiazePOXIDE 50 milliGRAM(s) Oral every 6 hours, Stop order after: 4 Doses  chlordiazePOXIDE   Oral   clonazePAM  Tablet 0.5 milliGRAM(s) Oral two times a day  LORazepam   Injectable 2 milliGRAM(s) IV Push every 1 hour PRN Symptom-triggered: each CIWA -Ar score 8 or GREATER  melatonin 3 milliGRAM(s) Oral at bedtime PRN Insomnia  ondansetron Injectable 4 milliGRAM(s) IV Push every 8 hours PRN Nausea and/or Vomiting  sertraline 25 milliGRAM(s) Oral two times a day  aluminum hydroxide/magnesium hydroxide/simethicone Suspension 30 milliLiter(s) Oral every 4 hours PRN Dyspepsia  apixaban 5 milliGRAM(s) Oral every 12 hours  atorvastatin 20 milliGRAM(s) Oral at bedtime  folic acid 1 milliGRAM(s) Oral daily  multivitamin 1 Tablet(s) Oral daily  nicotine - 21 mG/24Hr(s) Patch 1 patch Transdermal daily  thiamine 100 milliGRAM(s) Oral daily    ALLERGIES: No Known Allergies    REVIEW OF SYMPTOMS:   CONSTITUTIONAL: No fever, no chills, no weight loss, + weight gain, + fatigue   ENMT: No vertigo  NECK: No stiffness  CARDIOVASCULAR: No chest pain, + dyspnea, no syncope/presyncope, + palpitations, no dizziness, + Orthopnea, no Paroxsymal nocturnal dyspnea  RESPIRATORY: + Shortness of breath, no cough, no wheezing  GI: No dark color stool, no nausea, no diarrhea, no constipation, no abdominal pain   NEURO: No headache, no slurred speech   MUSCULOSKELETAL: No joint pain or swelling; No muscle, back, or extremity pain  PSYCH: No agitation, + anxiety    ALL OTHER REVIEW OF SYSTEMS ARE NEGATIVE.    VITAL SIGNS:  T(C): 37.1 (22 @ 15:26), Max: 37.1 (22 @ 15:26)  T(F): 98.7 (22 @ 15:26), Max: 98.7 (22 @ 15:26)  HR: 94 (22 @ 15:26) (94 - 94)  BP: 188/105 (22 @ 15:26) (188/105 - 188/105)  RR: 20 (22 @ 15:26) (20 - 20)  SpO2: 96% (22 @ 15:26) (96% - 96%)    INTAKE AND OUTPUT:     PHYSICAL EXAM:  Constitutional: Comfortable, no acute distress   HEENT: Atraumatic, neck is supple, no JVD  CNS: A&Ox3. No focal motor or sensory deficits   Respiratory: + fine crackles at the bases B/L 1/3 up, no wheezing   Cardiovascular: RRR, normal S1S2, no murmur  Gastrointestinal: Soft, non-tender, +Bowel sounds   Extremities: 2+ Peripheral Pulses, no cyanosis, + 1+ B/L LE pitting edema  Psychiatric: Calm   Skin: Warm and dry, pale     LABS:  ( 07 Mar 2022 15:54 )  Troponin T  <0.01,  CPK  X    , CKMB  X    , BNP 1192<H>                         13.5   8.95  )-----------( 228      ( 07 Mar 2022 15:54 )             40.2     03-07    138  |  102  |  21.6<H>  ----------------------------<  112<H>  3.9   |  24.0  |  1.17    Ca    8.6      07 Mar 2022 15:54  Phos  3.5     03-07  Mg     1.8     03-07    TPro  6.6  /  Alb  4.1  /  TBili  <0.2<L>  /  DBili  0.1  /  AST  19  /  ALT  16  /  AlkPhos  53  03-07    PT: 12.2 sec;   INR: 1.05 ratio   PTT:28.2 sec    Urinalysis Basic - ( 07 Mar 2022 17:27 )  Color: Yellow / Appearance: Clear / S.020 / pH: x  Gluc: x / Ketone: Negative  / Bili: Negative / Urobili: Negative mg/dL   Blood: x / Protein: 15 / Nitrite: Negative   Leuk Esterase: Negative / RBC: 0-2 /HPF / WBC 0-2 /HPF   Sq Epi: x / Non Sq Epi: Occasional / Bacteria: Occasional    Thyroid Stimulating Hormone, Serum: 1.86 uIU/mL (22 @ 15:54)    INTERPRETATION OF TELEMETRY: Sinus no ectopy  ECG: NSR@ 93bpm, LAD, no acute T or ST changes  Prior ECG: Yes [X] No [  ]    RADIOLOGY & ADDITIONAL STUDIES:   X-ray:  INTERPRETATION:  AP chest on 2022 at 4:26 PM. Patient has palpitations.  Heart enlargement again noted. Lungs remain clear. Chest is similar to January 12 of this year.    IMPRESSION: Heart enlargement again noted.    JACKSON SANTIAGO MD; Attending Radiologist  This document has been electronically signed. Mar  7 2022      CT scan: PROCEDURE: CT of the Abdomen and Pelvis was performed. Sagittal and coronal reformats were performed.    FINDINGS: LOWER CHEST: Clear.  LIVER: Normal.  BILE DUCTS: Nondilated.  GALLBLADDER: Normal.  SPLEEN: Normal.  PANCREAS: Normal.  ADRENALS: Normal.  KIDNEYS/URETERS: No hydronephrosis or urinary tract calculi.  BLADDER: Diffuse wall thickening.  REPRODUCTIVE ORGANS: Enlarged prostate.  BOWEL: No bowel-related abnormality. Specifically, no evidence of acute   diverticulitis. Normal appendix and ileocecal region. No bowel   obstruction or bowel inflammation.  PERITONEUM: No free air or ascites.  VESSELS: Aortoiliac atherosclerosis without aneurysm.  RETROPERITONEUM/LYMPH NODES: No adenopathy.  ABDOMINAL WALL: Normal.  BONES: No acute bony abnormality.    IMPRESSION: No acute pathology.  No ascites.    SONG VO MD; Attending Radiologist  This document has been electronically signed. Mar  7 2022

## 2022-03-07 NOTE — H&P ADULT - NSHPPHYSICALEXAM_GEN_ALL_CORE
Vital Signs Last 24 Hrs  T(C): 37.1 (07 Mar 2022 15:26), Max: 37.1 (07 Mar 2022 15:26)  T(F): 98.7 (07 Mar 2022 15:26), Max: 98.7 (07 Mar 2022 15:26)  HR: 94 (07 Mar 2022 15:26) (94 - 94)  BP: 188/105 (07 Mar 2022 15:26) (188/105 - 188/105)  BP(mean): --  RR: 20 (07 Mar 2022 15:26) (20 - 20)  SpO2: 96% (07 Mar 2022 15:26) (96% - 96%)

## 2022-03-07 NOTE — ED PROVIDER NOTE - ATTENDING CONTRIBUTION TO CARE
Yan BENDER- 61 Y/OM with h/o htn, hld, afib on eliquis, chf , alcohol abuse p/w feeling sob and weak and palpitations and had last drink 3 hrs ago. Pt was in detox and got covid and was sent home and then he started drinking. No fever, chills, syncope but had increased abdominal girth.     Pt is alert, well appearing male, s1 s2 normal reg, b/l clear breath sounds, abd soft, nt, distended with  ascites , normal bwoel sounds, neuro exam aox3 cn 2-12 intact,  skin warm, dry, good turgor, pedal edema1+    plan to diuresis, start metoprolol, check labs, r/o acs, chf, pna, will do ct abd to assess for liver disease and ascites, check urine

## 2022-03-07 NOTE — CONSULT NOTE ADULT - ATTENDING COMMENTS
60yo M w/ PMHx HTN, HLD, paroxysmal A- fib on Eliquis, diastolic HF, ETOH abuse admitted after he developed  palpitations, exertional dyspnea, sweating and orthopnea likely due to acute decompensated dHF.  Patient was not taking his prescribed cardiac meds for a few weeks.  ECG no acute changes and CE x1 negative.  Pro-BNP =1100.    Patient seen in CDU, he is feeling fine, Monitor NSR. No PAF    Patients Primary Cardiologist: Katie Perez, [place]    Exam: Chest- Bilateral clear BS  Cardiac- Normal S1 and S2    I concur with the above assessment and plan.    Will follow.

## 2022-03-07 NOTE — CONSULT NOTE ADULT - ASSESSMENT
62yo M w/ PMHx HTN, HLD, paroxysmal A- fib on Eliquis, diastolic HF, ETOH abuse admitted after he developed  palpitations, exertional dyspnea, sweating and orthopnea likely due to acute decompensated dHF.  Patient was not taking his prescribed cardiac meds for a few weeks.  ECG no acute changes and CE x1 negative.  Pro-BNP =1100.

## 2022-03-07 NOTE — H&P ADULT - HISTORY OF PRESENT ILLNESS
60 y/o male with PMH of HTN, HLD, afib on Eliquis, CHFpEF, EtOH abuse came to the ED complaining of palpitation and difficulty breathing. Patient noted associated dizziness, "funny feeling in my chest"; said the symptoms were the same as when he got rapid afib. Patient noted orthopnea and nausea. He has not been taken his medication for few weeks because he ran out. He has no fever, chills, vomiting, abdominal pain, change in bowel/urinary habit, recent travel, calf pain. Of note, patient still drinking daily 2 quarts of vodka (last used today before coming to the ED) and still smokes 2-3 packs of cigarette daily.

## 2022-03-07 NOTE — CONSULT NOTE ADULT - PROBLEM SELECTOR RECOMMENDATION 4
Monitor on tele, currently in NSR on monitor no chest pain, [id number]= 2 (HF, HTN)  - resume Metoprolol 200mg oral daily for rate and rhythm control  - continue Eliquis 5mg oral 2x daily, monitor daily CBC, Plt count and for acute bleeds  - scheduled for TTE in AM       case d/w Dr. Warner

## 2022-03-07 NOTE — ED PROVIDER NOTE - PROGRESS NOTE DETAILS
pt has acute chf symptoms, will amdit for acute chf, risk of withdrawl due to alcohol and will admit to medicine

## 2022-03-07 NOTE — CONSULT NOTE ADULT - PROBLEM SELECTOR RECOMMENDATION 9
Patient Seen in: [facility] AND [facility] Emergency Department      History   Patient presents with:  Nausea/Vomiting/Diarrhea    Stated Complaint: Abd pain/Nausea    Subjective:   HPI    70-year-old female with no significant past medical history presents to t cervical LAD   Neurological: Awake, alert. Normal reflexes. No cranial nerve deficit. Skin: Skin is warm and dry. No rash noted. No erythema.    Psychiatric:    ED Course     Labs Reviewed   BASIC METABOLIC PANEL (8) - Abnormal; Notable for the following encounter diagnosis)     Disposition:  Discharge  12/16/2021  5:25 pm    Follow-up:  Greg Dennis MD  [address] [phone]    Call in 2 days            Medications Prescribed:  Current Discharge Medication [facility] Admit to Tele, check labs including TFTs, trend CE x3, ECG and CXR  - follow FLP and A1C in the morning  - continue Lasix 40mg IVP 2x daily for short 24 hours  - monitor urine output and fluid restrict for 1200ml daily, daily weights and low Na diet  - resume home Lopressor, Entresto, and Statin therapies  - will repeat TTE in am to assess functional and structural status  - reinforced need for medication compliance and daily activities  - educated on risks of continued ETOH and tobacco use

## 2022-03-08 LAB
A1C WITH ESTIMATED AVERAGE GLUCOSE RESULT: 5.5 % — SIGNIFICANT CHANGE UP (ref 4–5.6)
ANION GAP SERPL CALC-SCNC: 13 MMOL/L — SIGNIFICANT CHANGE UP (ref 5–17)
BUN SERPL-MCNC: 19 MG/DL — SIGNIFICANT CHANGE UP (ref 8–20)
CALCIUM SERPL-MCNC: 8.7 MG/DL — SIGNIFICANT CHANGE UP (ref 8.6–10.2)
CHLORIDE SERPL-SCNC: 105 MMOL/L — SIGNIFICANT CHANGE UP (ref 98–107)
CHOLEST SERPL-MCNC: 159 MG/DL — SIGNIFICANT CHANGE UP
CK SERPL-CCNC: 101 U/L — SIGNIFICANT CHANGE UP (ref 30–200)
CO2 SERPL-SCNC: 25 MMOL/L — SIGNIFICANT CHANGE UP (ref 22–29)
CREAT SERPL-MCNC: 1.19 MG/DL — SIGNIFICANT CHANGE UP (ref 0.5–1.3)
EGFR: 69 ML/MIN/1.73M2 — SIGNIFICANT CHANGE UP
ESTIMATED AVERAGE GLUCOSE: 111 MG/DL — SIGNIFICANT CHANGE UP (ref 68–114)
GLUCOSE BLDC GLUCOMTR-MCNC: 103 MG/DL — HIGH (ref 70–99)
GLUCOSE SERPL-MCNC: 93 MG/DL — SIGNIFICANT CHANGE UP (ref 70–99)
HDLC SERPL-MCNC: 50 MG/DL — SIGNIFICANT CHANGE UP
LIPID PNL WITH DIRECT LDL SERPL: 83 MG/DL — SIGNIFICANT CHANGE UP
MAGNESIUM SERPL-MCNC: 1.9 MG/DL — SIGNIFICANT CHANGE UP (ref 1.6–2.6)
NON HDL CHOLESTEROL: 109 MG/DL — SIGNIFICANT CHANGE UP
PHOSPHATE SERPL-MCNC: 3.6 MG/DL — SIGNIFICANT CHANGE UP (ref 2.4–4.7)
POTASSIUM SERPL-MCNC: 3.2 MMOL/L — LOW (ref 3.5–5.3)
POTASSIUM SERPL-SCNC: 3.2 MMOL/L — LOW (ref 3.5–5.3)
SODIUM SERPL-SCNC: 143 MMOL/L — SIGNIFICANT CHANGE UP (ref 135–145)
TRIGL SERPL-MCNC: 132 MG/DL — SIGNIFICANT CHANGE UP
TROPONIN T SERPL-MCNC: <0.01 NG/ML — SIGNIFICANT CHANGE UP (ref 0–0.06)

## 2022-03-08 PROCEDURE — 99233 SBSQ HOSP IP/OBS HIGH 50: CPT

## 2022-03-08 PROCEDURE — 93306 TTE W/DOPPLER COMPLETE: CPT | Mod: 26

## 2022-03-08 PROCEDURE — 99222 1ST HOSP IP/OBS MODERATE 55: CPT

## 2022-03-08 RX ORDER — FUROSEMIDE 40 MG
40 TABLET ORAL
Refills: 0 | Status: DISCONTINUED | OUTPATIENT
Start: 2022-03-08 | End: 2022-03-09

## 2022-03-08 RX ORDER — DILTIAZEM HCL 120 MG
10 CAPSULE, EXT RELEASE 24 HR ORAL ONCE
Refills: 0 | Status: COMPLETED | OUTPATIENT
Start: 2022-03-08 | End: 2022-03-08

## 2022-03-08 RX ORDER — HYDRALAZINE HCL 50 MG
100 TABLET ORAL THREE TIMES A DAY
Refills: 0 | Status: DISCONTINUED | OUTPATIENT
Start: 2022-03-08 | End: 2022-03-10

## 2022-03-08 RX ORDER — ALPRAZOLAM 0.25 MG
1 TABLET ORAL
Refills: 0 | Status: DISCONTINUED | OUTPATIENT
Start: 2022-03-08 | End: 2022-03-10

## 2022-03-08 RX ORDER — POTASSIUM CHLORIDE 20 MEQ
40 PACKET (EA) ORAL EVERY 4 HOURS
Refills: 0 | Status: COMPLETED | OUTPATIENT
Start: 2022-03-08 | End: 2022-03-08

## 2022-03-08 RX ADMIN — Medication 50 MILLIGRAM(S): at 06:11

## 2022-03-08 RX ADMIN — Medication 200 MILLIGRAM(S): at 06:12

## 2022-03-08 RX ADMIN — Medication 10 MILLIGRAM(S): at 10:27

## 2022-03-08 RX ADMIN — Medication 1 MILLIGRAM(S): at 08:26

## 2022-03-08 RX ADMIN — Medication 75 MILLIGRAM(S): at 06:12

## 2022-03-08 RX ADMIN — Medication 100 MILLIGRAM(S): at 08:26

## 2022-03-08 RX ADMIN — Medication 2 MILLIGRAM(S): at 20:22

## 2022-03-08 RX ADMIN — Medication 50 MILLIGRAM(S): at 21:26

## 2022-03-08 RX ADMIN — Medication 40 MILLIGRAM(S): at 14:36

## 2022-03-08 RX ADMIN — APIXABAN 5 MILLIGRAM(S): 2.5 TABLET, FILM COATED ORAL at 06:12

## 2022-03-08 RX ADMIN — SACUBITRIL AND VALSARTAN 1 TABLET(S): 24; 26 TABLET, FILM COATED ORAL at 06:12

## 2022-03-08 RX ADMIN — Medication 100 MILLIGRAM(S): at 21:27

## 2022-03-08 RX ADMIN — Medication 40 MILLIGRAM(S): at 06:11

## 2022-03-08 RX ADMIN — SERTRALINE 25 MILLIGRAM(S): 25 TABLET, FILM COATED ORAL at 21:27

## 2022-03-08 RX ADMIN — Medication 40 MILLIEQUIVALENT(S): at 12:32

## 2022-03-08 RX ADMIN — ATORVASTATIN CALCIUM 20 MILLIGRAM(S): 80 TABLET, FILM COATED ORAL at 21:34

## 2022-03-08 RX ADMIN — APIXABAN 5 MILLIGRAM(S): 2.5 TABLET, FILM COATED ORAL at 18:01

## 2022-03-08 RX ADMIN — Medication 0.5 MILLIGRAM(S): at 08:27

## 2022-03-08 RX ADMIN — Medication 40 MILLIEQUIVALENT(S): at 08:26

## 2022-03-08 RX ADMIN — Medication 1 MILLIGRAM(S): at 22:57

## 2022-03-08 RX ADMIN — SACUBITRIL AND VALSARTAN 1 TABLET(S): 24; 26 TABLET, FILM COATED ORAL at 18:01

## 2022-03-08 RX ADMIN — Medication 100 MILLIGRAM(S): at 14:35

## 2022-03-08 RX ADMIN — Medication 1 TABLET(S): at 08:26

## 2022-03-08 RX ADMIN — AMLODIPINE BESYLATE 10 MILLIGRAM(S): 2.5 TABLET ORAL at 06:15

## 2022-03-08 RX ADMIN — SERTRALINE 25 MILLIGRAM(S): 25 TABLET, FILM COATED ORAL at 06:12

## 2022-03-08 RX ADMIN — Medication 50 MILLIGRAM(S): at 12:32

## 2022-03-08 RX ADMIN — Medication 1 PATCH: at 08:24

## 2022-03-08 RX ADMIN — Medication 0.5 MILLIGRAM(S): at 14:33

## 2022-03-08 NOTE — PROGRESS NOTE ADULT - SUBJECTIVE AND OBJECTIVE BOX
HEALTH ISSUES - PROBLEM Dx:  PMH of HTN, HLD, afib on Eliquis, CHFpEF, EtOH abuse    s/p A fib RVR,     INTERVAL HPI/ OVERNIGHT EVENTS:    c/o anxiety saying librium doesn't help  but not in active withdrawal currently  no SOB or leg edema, palpitations  lying in bed  asking for 3 mg xanax prn- explained that along with librium low dose is the best to use   he asked for 1 mg then    REVIEW OF SYSTEMS:    as above    Vital Signs Last 24 Hrs  T(C): 36.9 (08 Mar 2022 11:10), Max: 37.1 (07 Mar 2022 15:26)  T(F): 98.5 (08 Mar 2022 11:10), Max: 98.7 (07 Mar 2022 15:26)  HR: 56 (08 Mar 2022 11:10) (54 - 94)  BP: 149/84 (08 Mar 2022 11:10) (149/84 - 188/105)  BP(mean): --  RR: 18 (08 Mar 2022 11:10) (17 - 20)  SpO2: 97% (08 Mar 2022 11:10) (96% - 99%)    PHYSICAL EXAM-  GENERAL: Comfortable, Obese, lying in bed  HEAD:  Atraumatic, Normocephalic  EYES: EOMI, PERRLA, conjunctiva and sclera clear  ENT: Moist mucous membranes,  No lesions  NECK: Supple, No JVD, Normal thyroid  NERVOUS SYSTEM:  Alert & Oriented X 3, Motor Strength 5/5 B/L upper and lower extremities  CHEST/LUNG: CTA bilaterally; No rales, rhonchi, wheezing, or rubs  HEART: Regular rate and rhythm; No murmurs, rubs, or gallops  ABDOMEN: Soft, Nontender, Nondistended; Bowel sounds present  EXTREMITIES:  2+ Peripheral Pulses, No clubbing, cyanosis, or edema  SKIN: No rashes or lesions    MEDICATIONS  (STANDING):  amLODIPine   Tablet 10 milliGRAM(s) Oral daily  apixaban 5 milliGRAM(s) Oral every 12 hours  atorvastatin 20 milliGRAM(s) Oral at bedtime  chlordiazePOXIDE   Oral   chlordiazePOXIDE 50 milliGRAM(s) Oral every 8 hours  clonazePAM  Tablet 0.5 milliGRAM(s) Oral two times a day  folic acid 1 milliGRAM(s) Oral daily  furosemide   Injectable 40 milliGRAM(s) IV Push two times a day  hydrALAZINE 100 milliGRAM(s) Oral three times a day  metoprolol succinate  milliGRAM(s) Oral daily  multivitamin 1 Tablet(s) Oral daily  nicotine - 21 mG/24Hr(s) Patch 1 patch Transdermal daily  sacubitril 49 mG/valsartan 51 mG 1 Tablet(s) Oral two times a day  sertraline 25 milliGRAM(s) Oral two times a day  thiamine 100 milliGRAM(s) Oral daily    MEDICATIONS  (PRN):  acetaminophen     Tablet .. 650 milliGRAM(s) Oral every 6 hours PRN Temp greater or equal to 38C (100.4F), Mild Pain (1 - 3)  aluminum hydroxide/magnesium hydroxide/simethicone Suspension 30 milliLiter(s) Oral every 4 hours PRN Dyspepsia  LORazepam   Injectable 2 milliGRAM(s) IV Push every 1 hour PRN Symptom-triggered: each CIWA -Ar score 8 or GREATER  melatonin 3 milliGRAM(s) Oral at bedtime PRN Insomnia  ondansetron Injectable 4 milliGRAM(s) IV Push every 8 hours PRN Nausea and/or Vomiting      LABS:                        13.5   8.95  )-----------( 228      ( 07 Mar 2022 15:54 )             40.2     03-08    143  |  105  |  19.0  ----------------------------<  93  3.2<L>   |  25.0  |  1.19    Ca    8.7      08 Mar 2022 03:29  Phos  3.6     03-08  Mg     1.9     03-08    TPro  6.6  /  Alb  4.1  /  TBili  <0.2<L>  /  DBili  0.1  /  AST  19  /  ALT  16  /  AlkPhos  53  03-07    PT/INR - ( 07 Mar 2022 15:54 )   PT: 12.2 sec;   INR: 1.05 ratio         PTT - ( 07 Mar 2022 15:54 )  PTT:28.2 sec  Urinalysis Basic - ( 07 Mar 2022 17:27 )    Color: Yellow / Appearance: Clear / S.020 / pH: x  Gluc: x / Ketone: Negative  / Bili: Negative / Urobili: Negative mg/dL   Blood: x / Protein: 15 / Nitrite: Negative   Leuk Esterase: Negative / RBC: 0-2 /HPF / WBC 0-2 /HPF   Sq Epi: x / Non Sq Epi: Occasional / Bacteria: Occasional

## 2022-03-08 NOTE — SBIRT NOTE ADULT - NSSBIRTDRGPOSREINDET_GEN_A_CORE
Provided SBIRT services: Full screen Negative. Positive reinforcement provided given patient currently within healthy guidelines. Education materials reviewed and given to patient.

## 2022-03-08 NOTE — CHART NOTE - NSCHARTNOTEFT_GEN_A_CORE
Called by RN after pt feeling anxious, having tremors.    Stat vitals obtained; /61 P 60s on CM R 20 T 97.8   Reviewed chart, pt does have ETOH abuse history, has Librium/ prn Ativan ordered  Informed RN to do CIWA, scored @8; Assessed pt by bedside; feeling anxious, "jittery" Denies CP, SOB  RN to give Ativan as ordered.  Continue to monitor and escalate prn.

## 2022-03-08 NOTE — PROGRESS NOTE ADULT - ASSESSMENT
60 y/o male with PMH of HTN, HLD, afib on Eliquis, CHFpEF, EtOH abuse came to the ED complaining of palpitation and difficulty breathing. Patient noted associated dizziness, "funny feeling in my chest"; said the symptoms were the same as when he got rapid afib. Patient noted orthopnea and nausea. He has not been taken his medication for few weeks because he ran out. In the ED, elevated BP, Lasix given; cardiology consulted     Acute on chronic CHFpEF due to medication non-compliance   Admit to telemetry   Lasix 40mg given in the ED, will continue   Continue Entresto 49-51mg bid   Daily weight   Medication compliance re-enforced   Cardiology consulted     s/p Hypertensive urgency, now uncontrolled HTN  Amlodipine 10mg   Metoprolol ER 200mg with holding parameters   Hydralazine 75mg tid increase to 100 tid  increase lasix to bid x 1-2 days    EtOH abuse   Patient with high risk of alcohol withdrawal   Will start on CIWA protocol   MVT   Folic acid  Thiamine for possible thiamine deficiency due to EtOH use   Aspiration/withdrawal precaution   Cessation advised   librium taper and symptom triggered ativan  xanax 1 mg bid for anxiety PRN    s/p Afib RVR now in NSR  Eliquis 5mg bid   Metoprolol ER 200mg   ECHO    HLD  Atorvastatin 20mg     Tobacco abuse   Nicotine patch ordered   Cessation advised     Supportive   DVT prophylaxis: Eliquis   Diet: DASH     Plan of care discussed with patient     Dispo- grp home  LOS- 3-4 days

## 2022-03-08 NOTE — SBIRT NOTE ADULT - NSSBIRTALCPOSREINDET_GEN_A_CORE
RT = Provided SBIRT services.  Patient provided with motivational information for reducing, discussed consequences of risky use, and pt resfued active referral to treatment. health  printed information related to nan as was the request by  patient.

## 2022-03-09 LAB
ANION GAP SERPL CALC-SCNC: 12 MMOL/L — SIGNIFICANT CHANGE UP (ref 5–17)
BUN SERPL-MCNC: 22.2 MG/DL — HIGH (ref 8–20)
CALCIUM SERPL-MCNC: 8.9 MG/DL — SIGNIFICANT CHANGE UP (ref 8.6–10.2)
CHLORIDE SERPL-SCNC: 105 MMOL/L — SIGNIFICANT CHANGE UP (ref 98–107)
CO2 SERPL-SCNC: 25 MMOL/L — SIGNIFICANT CHANGE UP (ref 22–29)
CREAT SERPL-MCNC: 1.2 MG/DL — SIGNIFICANT CHANGE UP (ref 0.5–1.3)
EGFR: 68 ML/MIN/1.73M2 — SIGNIFICANT CHANGE UP
GLUCOSE SERPL-MCNC: 109 MG/DL — HIGH (ref 70–99)
MAGNESIUM SERPL-MCNC: 2.1 MG/DL — SIGNIFICANT CHANGE UP (ref 1.6–2.6)
PHOSPHATE SERPL-MCNC: 3.5 MG/DL — SIGNIFICANT CHANGE UP (ref 2.4–4.7)
POTASSIUM SERPL-MCNC: 4 MMOL/L — SIGNIFICANT CHANGE UP (ref 3.5–5.3)
POTASSIUM SERPL-SCNC: 4 MMOL/L — SIGNIFICANT CHANGE UP (ref 3.5–5.3)
SODIUM SERPL-SCNC: 142 MMOL/L — SIGNIFICANT CHANGE UP (ref 135–145)

## 2022-03-09 PROCEDURE — 99232 SBSQ HOSP IP/OBS MODERATE 35: CPT

## 2022-03-09 RX ORDER — METOPROLOL TARTRATE 50 MG
150 TABLET ORAL DAILY
Refills: 0 | Status: DISCONTINUED | OUTPATIENT
Start: 2022-03-09 | End: 2022-03-10

## 2022-03-09 RX ORDER — FUROSEMIDE 40 MG
40 TABLET ORAL DAILY
Refills: 0 | Status: DISCONTINUED | OUTPATIENT
Start: 2022-03-10 | End: 2022-03-10

## 2022-03-09 RX ORDER — HYDRALAZINE HCL 50 MG
10 TABLET ORAL ONCE
Refills: 0 | Status: COMPLETED | OUTPATIENT
Start: 2022-03-09 | End: 2022-03-09

## 2022-03-09 RX ADMIN — Medication 10 MILLIGRAM(S): at 18:56

## 2022-03-09 RX ADMIN — Medication 2 MILLIGRAM(S): at 21:28

## 2022-03-09 RX ADMIN — Medication 100 MILLIGRAM(S): at 21:28

## 2022-03-09 RX ADMIN — Medication 0.5 MILLIGRAM(S): at 05:24

## 2022-03-09 RX ADMIN — Medication 1 PATCH: at 14:07

## 2022-03-09 RX ADMIN — ATORVASTATIN CALCIUM 20 MILLIGRAM(S): 80 TABLET, FILM COATED ORAL at 21:28

## 2022-03-09 RX ADMIN — Medication 40 MILLIGRAM(S): at 05:24

## 2022-03-09 RX ADMIN — SACUBITRIL AND VALSARTAN 1 TABLET(S): 24; 26 TABLET, FILM COATED ORAL at 18:04

## 2022-03-09 RX ADMIN — Medication 2 MILLIGRAM(S): at 13:38

## 2022-03-09 RX ADMIN — AMLODIPINE BESYLATE 10 MILLIGRAM(S): 2.5 TABLET ORAL at 05:25

## 2022-03-09 RX ADMIN — APIXABAN 5 MILLIGRAM(S): 2.5 TABLET, FILM COATED ORAL at 18:03

## 2022-03-09 RX ADMIN — APIXABAN 5 MILLIGRAM(S): 2.5 TABLET, FILM COATED ORAL at 05:24

## 2022-03-09 RX ADMIN — Medication 100 MILLIGRAM(S): at 05:25

## 2022-03-09 RX ADMIN — SERTRALINE 25 MILLIGRAM(S): 25 TABLET, FILM COATED ORAL at 18:03

## 2022-03-09 RX ADMIN — Medication 2 MILLIGRAM(S): at 09:51

## 2022-03-09 RX ADMIN — Medication 50 MILLIGRAM(S): at 05:24

## 2022-03-09 RX ADMIN — SERTRALINE 25 MILLIGRAM(S): 25 TABLET, FILM COATED ORAL at 06:59

## 2022-03-09 RX ADMIN — Medication 1 MILLIGRAM(S): at 13:35

## 2022-03-09 RX ADMIN — Medication 100 MILLIGRAM(S): at 13:35

## 2022-03-09 RX ADMIN — Medication 1 TABLET(S): at 13:33

## 2022-03-09 RX ADMIN — Medication 2 MILLIGRAM(S): at 07:25

## 2022-03-09 RX ADMIN — Medication 2 MILLIGRAM(S): at 04:07

## 2022-03-09 RX ADMIN — Medication 100 MILLIGRAM(S): at 13:36

## 2022-03-09 NOTE — PROGRESS NOTE ADULT - SUBJECTIVE AND OBJECTIVE BOX
HEALTH ISSUES - PROBLEM Dx:    PMH of HTN, HLD, afib on Eliquis, CHFpEF, EtOH abuse    s/p A fib RVR, Alc withdrawal    INTERVAL HPI/ OVERNIGHT EVENTS:    comfortable  receiving symptom triggered Ativan for CIWA high  no sob, cp,   admits to anxiety    REVIEW OF SYSTEMS:    as above    Vital Signs Last 24 Hrs  T(C): 36.6 (09 Mar 2022 07:49), Max: 36.9 (08 Mar 2022 11:10)  T(F): 97.8 (09 Mar 2022 07:49), Max: 98.5 (08 Mar 2022 11:10)  HR: 60 (09 Mar 2022 09:45) (51 - 66)  BP: 115/67 (09 Mar 2022 09:45) (115/67 - 185/114)  BP(mean): --  RR: 18 (09 Mar 2022 09:45) (17 - 18)  SpO2: 98% (09 Mar 2022 09:45) (96% - 100%)    PHYSICAL EXAM-  GENERAL: Comfortable, Obese, lying in bed  HEAD:  Atraumatic, Normocephalic  EYES: EOMI, PERRLA, conjunctiva and sclera clear  ENT: Moist mucous membranes,  No lesions  NECK: Supple, No JVD, Normal thyroid  NERVOUS SYSTEM:  Alert & Oriented X 3, Motor Strength 5/5 B/L upper and lower extremities  CHEST/LUNG: CTA bilaterally; No rales, rhonchi, wheezing, or rubs  HEART: Regular rate and rhythm; No murmurs, rubs, or gallops  ABDOMEN: Soft, Nontender, Nondistended; Bowel sounds present  EXTREMITIES:  2+ Peripheral Pulses, No clubbing, cyanosis, or edema  SKIN: No rashes or lesions    MEDICATIONS  (STANDING):  amLODIPine   Tablet 10 milliGRAM(s) Oral daily  apixaban 5 milliGRAM(s) Oral every 12 hours  atorvastatin 20 milliGRAM(s) Oral at bedtime  chlordiazePOXIDE   Oral   chlordiazePOXIDE 50 milliGRAM(s) Oral every 8 hours  folic acid 1 milliGRAM(s) Oral daily  hydrALAZINE 100 milliGRAM(s) Oral three times a day  metoprolol succinate  milliGRAM(s) Oral daily  multivitamin 1 Tablet(s) Oral daily  nicotine - 21 mG/24Hr(s) Patch 1 patch Transdermal daily  sacubitril 49 mG/valsartan 51 mG 1 Tablet(s) Oral two times a day  sertraline 25 milliGRAM(s) Oral two times a day  thiamine 100 milliGRAM(s) Oral daily    MEDICATIONS  (PRN):  acetaminophen     Tablet .. 650 milliGRAM(s) Oral every 6 hours PRN Temp greater or equal to 38C (100.4F), Mild Pain (1 - 3)  ALPRAZolam 1 milliGRAM(s) Oral two times a day PRN anxiety  aluminum hydroxide/magnesium hydroxide/simethicone Suspension 30 milliLiter(s) Oral every 4 hours PRN Dyspepsia  LORazepam   Injectable 2 milliGRAM(s) IV Push every 1 hour PRN Symptom-triggered: each CIWA -Ar score 8 or GREATER  melatonin 3 milliGRAM(s) Oral at bedtime PRN Insomnia  ondansetron Injectable 4 milliGRAM(s) IV Push every 8 hours PRN Nausea and/or Vomiting      LABS:                        13.5   8.95  )-----------( 228      ( 07 Mar 2022 15:54 )             40.2     03-09    142  |  105  |  22.2<H>  ----------------------------<  109<H>  4.0   |  25.0  |  1.20    Ca    8.9      09 Mar 2022 04:19  Phos  3.5     03-09  Mg     2.1     03-09    TPro  6.6  /  Alb  4.1  /  TBili  <0.2<L>  /  DBili  0.1  /  AST  19  /  ALT  16  /  AlkPhos  53  03-07    PT/INR - ( 07 Mar 2022 15:54 )   PT: 12.2 sec;   INR: 1.05 ratio         PTT - ( 07 Mar 2022 15:54 )  PTT:28.2 sec  Urinalysis Basic - ( 07 Mar 2022 17:27 )    Color: Yellow / Appearance: Clear / S.020 / pH: x  Gluc: x / Ketone: Negative  / Bili: Negative / Urobili: Negative mg/dL   Blood: x / Protein: 15 / Nitrite: Negative   Leuk Esterase: Negative / RBC: 0-2 /HPF / WBC 0-2 /HPF   Sq Epi: x / Non Sq Epi: Occasional / Bacteria: Occasional

## 2022-03-09 NOTE — PROGRESS NOTE ADULT - PROBLEM SELECTOR PLAN 2
116/67 stable.    ·  Recommendation: Low Na diet, optimize for BP goals based on JNC8  - Continue Metoprolol, Lasix, Norvasc, Entresto and Hydralazin

## 2022-03-09 NOTE — PROGRESS NOTE ADULT - PROBLEM SELECTOR PLAN 1
Problem/Recommendation - 1:  Heart failure with acute decompensation, type unknown.   - continue Lasix 40mg IVP daily  - monitor urine output and fluid restrict for 1200ml daily, daily weights and low Na diet  - I and O's likely note accurate  -Add strict I and O's.    - Continue Lopressor, Entresto, and Statin therapies  - TTE when Compared to the prior TTE study from 6/17/21, LV systolic function/EF   remain preserved, reduction in estimated PASP.  - reinforced need for medication compliance and daily activities  - educated on risks of continued ETOH and tobacco use.

## 2022-03-09 NOTE — PROGRESS NOTE ADULT - SUBJECTIVE AND OBJECTIVE BOX
[facility]                                                         CARDIOLOGY AT [facility]                                                                  [address], [place] [address]                                                         Telephone: [phone]. Fax:[phone]                                                                             PROGRESS NOTE    Reason for follow up:    paroxysmal A- fib on Eliquis, diastolic HF  Update:   Sitting up in bed eating breakfast.    Telemetry SR with occasional blocked pacs.     Appearing euvolemic on exam.      Review of symptoms:   Cardiac:  No chest pain. No dyspnea. No palpitations.  Respiratory: no cough. No dyspnea  Gastrointestinal: No diarrhea. No abdominal pain. No bleeding.   Neuro: No focal neuro complaints.    Vitals:  T(C): 36.6 (03-09-22 @ 07:49), Max: 36.9 (03-08-22 @ 11:10)  HR: 60 (03-09-22 @ 09:45) (51 - 66)  BP: 115/67 (03-09-22 @ 09:45) (115/67 - 185/114)  RR: 18 (03-09-22 @ 09:45) (17 - 18)  SpO2: 98% (03-09-22 @ 09:45) (96% - 100%)  Wt(kg): --  I&O's Summary    08 Mar 2022 07:01  -  09 Mar 2022 07:00  --------------------------------------------------------  IN: 240 mL / OUT: 0 mL / NET: 240 mL      Weight (kg): 95.3 (03-07 @ 15:26)    PHYSICAL EXAM:  Appearance: Comfortable. No acute distress  HEENT:  Atraumatic. Normocephalic.  Normal oral mucosa  Neurologic: A & O x 3, no gross focal deficits.  Cardiovascular: RRR S1 S2, No murmur, no rubs/gallops. No JVD  Respiratory: Lungs clear to auscultation, unlabored   Gastrointestinal:  Soft, Non-tender, + BS  Lower Extremities: 2+ Peripheral Pulses, No clubbing, cyanosis, or edema  Psychiatry: Patient is calm. No agitation.   Skin: warm and dry.    CURRENT CARDIAC MEDICATIONS:  amLODIPine   Tablet 10 milliGRAM(s) Oral daily  hydrALAZINE 100 milliGRAM(s) Oral three times a day  metoprolol succinate  milliGRAM(s) Oral daily  sacubitril 49 mG/valsartan 51 mG 1 Tablet(s) Oral two times a day      CURRENT OTHER MEDICATIONS:  acetaminophen     Tablet .. 650 milliGRAM(s) Oral every 6 hours PRN Temp greater or equal to 38C (100.4F), Mild Pain (1 - 3)  ALPRAZolam 1 milliGRAM(s) Oral two times a day PRN anxiety  chlordiazePOXIDE   Oral   chlordiazePOXIDE 50 milliGRAM(s) Oral every 8 hours  LORazepam   Injectable 2 milliGRAM(s) IV Push every 1 hour PRN Symptom-triggered: each CIWA -Ar score 8 or GREATER  melatonin 3 milliGRAM(s) Oral at bedtime PRN Insomnia  ondansetron Injectable 4 milliGRAM(s) IV Push every 8 hours PRN Nausea and/or Vomiting  sertraline 25 milliGRAM(s) Oral two times a day  aluminum hydroxide/magnesium hydroxide/simethicone Suspension 30 milliLiter(s) Oral every 4 hours PRN Dyspepsia  atorvastatin 20 milliGRAM(s) Oral at bedtime  apixaban 5 milliGRAM(s) Oral every 12 hours  folic acid 1 milliGRAM(s) Oral daily  multivitamin 1 Tablet(s) Oral daily  thiamine 100 milliGRAM(s) Oral daily      LABS:	 	  ( 08 Mar 2022 03:29 )  Troponin T  <0.01,  CPK  101  , CKMB  X    , BNP X        , ( 07 Mar 2022 15:54 )  Troponin T  <0.01,  CPK  X    , CKMB  X    , BNP 1192<H>                              13.5   8.95  )-----------( 228      ( 07 Mar 2022 15:54 )             40.2     03-09    142  |  105  |  22.2<H>  ----------------------------<  109<H>  4.0   |  25.0  |  1.20    Ca    8.9      09 Mar 2022 04:19  Phos  3.5     03-09  Mg     2.1     03-09    TPro  6.6  /  Alb  4.1  /  TBili  <0.2<L>  /  DBili  0.1  /  AST  19  /  ALT  16  /  AlkPhos  53  03-07    PT/INR/PTT ( 07 Mar 2022 15:54 )                       :                       :      12.2         :       28.2                  .        .                   .              .           .       1.05        .                                       Lipid Profile: Date: 03-08 @ 03:29  Total cholesterol 159; Direct LDL: --; HDL: 50; Triglycerides:132  TSH: Thyroid Stimulating Hormone, Serum: 1.86 uIU/mL    TELEMETRY: Sr, no acute alarms, blocked pacs                                                                   [facility]                                                         CARDIOLOGY AT [facility]                                                                  [address] [facility], [place] [address]                                                         Telephone: [phone]. Fax:[phone]                                                                             PROGRESS NOTE    Reason for follow up:    paroxysmal A- fib on Eliquis, diastolic HF  Update:   Sitting up in bed eating breakfast.    Telemetry SR with occasional blocked pacs.     Appearing euvolemic on exam.      Review of symptoms:   Cardiac:  No chest pain. No dyspnea. No palpitations.  Respiratory: no cough. No dyspnea  Gastrointestinal: No diarrhea. No abdominal pain. No bleeding.   Neuro: No focal neuro complaints.    Vitals:  T(C): 36.6 (03-09-22 @ 07:49), Max: 36.9 (03-08-22 @ 11:10)  HR: 60 (03-09-22 @ 09:45) (51 - 66)  BP: 115/67 (03-09-22 @ 09:45) (115/67 - 185/114)  RR: 18 (03-09-22 @ 09:45) (17 - 18)  SpO2: 98% (03-09-22 @ 09:45) (96% - 100%)  I&O's Summary    08 Mar 2022 07:01  -  09 Mar 2022 07:00  --------------------------------------------------------  IN: 240 mL / OUT: 0 mL / NET: 240 mL    Weight (kg): 95.3 (03-07 @ 15:26)    PHYSICAL EXAM:  Appearance: Comfortable. No acute distress  HEENT:  Atraumatic. Normocephalic.  Normal oral mucosa  Neurologic: A & O x 3, no gross focal deficits.  Cardiovascular: RRR S1 S2, No murmur, no rubs/gallops. No JVD  Respiratory: Lungs clear to auscultation, unlabored   Gastrointestinal:  Soft, Non-tender, + BS  Lower Extremities: 2+ Peripheral Pulses, No clubbing, cyanosis, or edema  Psychiatry: Patient is calm. No agitation.   Skin: warm and dry.    CURRENT CARDIAC MEDICATIONS:  amLODIPine   Tablet 10 milliGRAM(s) Oral daily  hydrALAZINE 100 milliGRAM(s) Oral three times a day  metoprolol succinate  milliGRAM(s) Oral daily  sacubitril 49 mG/valsartan 51 mG 1 Tablet(s) Oral two times a day      CURRENT OTHER MEDICATIONS:  acetaminophen     Tablet .. 650 milliGRAM(s) Oral every 6 hours PRN Temp greater or equal to 38C (100.4F), Mild Pain (1 - 3)  ALPRAZolam 1 milliGRAM(s) Oral two times a day PRN anxiety  chlordiazePOXIDE   Oral   chlordiazePOXIDE 50 milliGRAM(s) Oral every 8 hours  LORazepam   Injectable 2 milliGRAM(s) IV Push every 1 hour PRN Symptom-triggered: each CIWA -Ar score 8 or GREATER  melatonin 3 milliGRAM(s) Oral at bedtime PRN Insomnia  ondansetron Injectable 4 milliGRAM(s) IV Push every 8 hours PRN Nausea and/or Vomiting  sertraline 25 milliGRAM(s) Oral two times a day  aluminum hydroxide/magnesium hydroxide/simethicone Suspension 30 milliLiter(s) Oral every 4 hours PRN Dyspepsia  atorvastatin 20 milliGRAM(s) Oral at bedtime  apixaban 5 milliGRAM(s) Oral every 12 hours  folic acid 1 milliGRAM(s) Oral daily  multivitamin 1 Tablet(s) Oral daily  thiamine 100 milliGRAM(s) Oral daily      LABS:	 	  ( 08 Mar 2022 03:29 )  Troponin T  <0.01,  CPK  101  , CKMB  X    , BNP X        , ( 07 Mar 2022 15:54 )  Troponin T  <0.01,  CPK  X    , CKMB  X    , BNP 1192<H>                              13.5   8.95  )-----------( 228      ( 07 Mar 2022 15:54 )             40.2     03-09    142  |  105  |  22.2<H>  ----------------------------<  109<H>  4.0   |  25.0  |  1.20    Ca    8.9      09 Mar 2022 04:19  Phos  3.5     03-09  Mg     2.1     03-09    TPro  6.6  /  Alb  4.1  /  TBili  <0.2<L>  /  DBili  0.1  /  AST  19  /  ALT  16  /  AlkPhos  53  03-07    PT/INR/PTT ( 07 Mar 2022 15:54 )                       :                       :      12.2         :       28.2                  .        .                   .              .           .       1.05        .                                       Lipid Profile: Date: 03-08 @ 03:29  Total cholesterol 159; Direct LDL: --; HDL: 50; Triglycerides:132  TSH: Thyroid Stimulating Hormone, Serum: 1.86 uIU/mL    TELEMETRY: Sr, no acute alarms, blocked pacs     Summary:   1. Left ventricular ejection fraction, by visual estimation, is 65 to   70%.   2. Normal global left ventricular systolic function.   3. There is mild concentric left ventricular hypertrophy.   4. Diastolic dysfunction with normal filling pressure.   5. Normal right ventricular size and function, estimated PASP 24 mmHg.   6. Mildly enlarged left atrium.   7. Mild mitral valve regurgitation.   8. Sclerotic aortic valve with normal opening.   9. There is no evidence of pericardial effusion.  10. Compared to the prior TTE study from 6/17/21, LV systolic function/EF   remain preserved, reduction in estimated PASP.

## 2022-03-09 NOTE — CHART NOTE - NSCHARTNOTEFT_GEN_A_CORE
RN called to report pateint BP elevated and missed dose of metoprolol xl 150.  Stated patient is asymptomatic   V  ital Signs Last 24 Hrs  T(C): 36.8 (09 Mar 2022 16:33), Max: 36.9 (09 Mar 2022 05:23)  T(F): 98.3 (09 Mar 2022 16:33), Max: 98.4 (09 Mar 2022 05:23)  HR: 64 (09 Mar 2022 18:01) (51 - 66)  BP: 180/98 (09 Mar 2022 18:01) (115/67 - 185/114)  BP(mean): --  RR: 18 (09 Mar 2022 16:33) (17 - 18)  SpO2: 97% (09 Mar 2022 16:33) (96% - 100%)      RX 10mg  of Hydralazine IV for HTN

## 2022-03-09 NOTE — PROGRESS NOTE ADULT - ATTENDING COMMENTS
60yo M w/ PMHx HTN, HLD, paroxysmal A- fib on Eliquis, diastolic HF, ETOH abuse admitted after he developed  palpitations, exertional dyspnea, sweating and orthopnea likely due to acute decompensated dHF.  Patient was not taking his prescribed cardiac meds for a few weeks.  ECG no acute changes and CE x1 negative.  Pro-BNP =1100. Patient has history of pericardial effusion, s/p drainage.    Patient is on 4T, he is feeling fine, Monitor NSR. No PAF. NO complaints    Patients Primary Cardiologist: Alla [place], [place]    Exam: Chest- Bilateral clear BS  Cardiac- Normal S1 and S2      ECHO results noted.    I had a long discussion with the patient regarding his ETOH abuse, he is at risk of bleeding since he is on Eliquis. Patient verbalized his understanding. Patient stated that he is going to stop alcohol drinking.     Patient was advised to f/u with  his Cardiologist.  Discussed with the Hospitalist Dr Henley.    Will sign off.

## 2022-03-09 NOTE — PROGRESS NOTE ADULT - PROBLEM SELECTOR PLAN 3
- Low cholesterol diet, check FLP in AM  - resume Statin therapy and monitor daily LFTs  - daily exercise an optimal weight management.

## 2022-03-09 NOTE — PROGRESS NOTE ADULT - ASSESSMENT
62yo M w/ PMHx HTN, HLD, paroxysmal A- fib on Eliquis, diastolic HF, ETOH abuse admitted after he developed  palpitations, exertional dyspnea, sweating and orthopnea likely due to acute decompensated dHF.  Patient was not taking his prescribed cardiac meds for a few weeks.  ECG no acute changes and CE x1 negative.  Pro-BNP =1100.     Problem/Recommendation - 1:  ·  Problem: Heart failure with acute decompensation, type unknown.   ·  Recommendation: Admit to Tele, check labs including TFTs, trend CE x3, ECG and CXR  - follow FLP and A1C in the morning  - continue Lasix 40mg IVP 2x daily for short 24 hours  - monitor urine output and fluid restrict for 1200ml daily, daily weights and low Na diet  - resume home Lopressor, Entresto, and Statin therapies  - will repeat TTE in am to assess functional and structural status  - reinforced need for medication compliance and daily activities  - educated on risks of continued ETOH and tobacco use.     Problem/Recommendation - 2:  ·  Problem: HTN (hypertension).   ·  Recommendation: Low Na diet, optimize for BP goals based on JNC8  - resume Metoprolol, Lasix, Norvasc, Entresto and Hydralazine  - monitor urine output.     Problem/Recommendation - 3:  ·  Problem: HLD (hyperlipidemia).   ·  Recommendation: Low cholesterol diet, check FLP in AM  - resume Statin therapy and monitor daily LFTs  - daily exercise an optimal weight management.     Problem/Recommendation - 4:  ·  Problem: Paroxysmal atrial fibrillation.   ·  Recommendation: Monitor on tele, currently in NSR on monitor no chest pain, [id number]= 2 (HF, HTN)  - resume Metoprolol 200mg oral daily for rate and rhythm control  - continue Eliquis 5mg oral 2x daily, monitor daily CBC, Plt count and for acute bleeds  - scheduled for TTE in AM       62yo M w/ PMHx HTN, HLD, paroxysmal A- fib on Eliquis, diastolic HF, ETOH abuse admitted after he developed  palpitations, exertional dyspnea, sweating and orthopnea likely due to acute decompensated dHF.  Patient was not taking his prescribed cardiac meds for a few weeks.  ECG no acute changes and CE x1 negative.  Pro-BNP =1100.    Will follow.      60yo M w/ PMHx HTN, HLD, paroxysmal A- fib on Eliquis, diastolic HF, ETOH abuse admitted after he developed  palpitations, exertional dyspnea, sweating and orthopnea likely due to acute decompensated dHF.  Patient was not taking his prescribed cardiac meds for a few weeks.  ECG no acute changes and CE x1 negative.  Pro-BNP =1100.    3/9/22:   Sitting up in bed eating breakfast.    Telemetry SR with occasional blocked pacs.     Appearing euvolemic on exam.

## 2022-03-09 NOTE — CHART NOTE - NSCHARTNOTEFT_GEN_A_CORE
pts HR down to 46 when sleeping, not sustained  Asymptomatic, without complaints  VSS NAD  Pt on Toprol XL 200mg daily  BMP and MG ordered  Continue to monitor

## 2022-03-09 NOTE — PROGRESS NOTE ADULT - ASSESSMENT
60 y/o male with PMH of HTN, HLD, afib on Eliquis, CHFpEF, EtOH abuse came to the ED complaining of palpitation and difficulty breathing. Patient noted associated dizziness, "funny feeling in my chest"; said the symptoms were the same as when he got rapid afib. Patient noted orthopnea and nausea. He has not been taken his medication for few weeks because he ran out. In the ED, elevated BP, Lasix given; cardiology consulted     Acute on chronic CHFpEF due to medication non-compliance - resolved  Lasix 40mg daily  Continue Entresto 49-51mg bid   Daily weight   Medication compliance re-enforced   Cardiology following    s/p Hypertensive urgency, now uncontrolled HTN  Amlodipine 10mg   Metoprolol ER 150mg with holding parameters   Hydralazine 75mg tid increase to 100 tid  Lasix QD now  Monitor and add other agents as needed    EtOH abuse   Patient with high risk of alcohol withdrawal   Will start on CIWA protocol - CIWA 15- 17  MVT   Folic acid  Thiamine for possible thiamine deficiency due to EtOH use   Aspiration/withdrawal precaution   Cessation advised   librium taper and symptom triggered ativan  xanax 1 mg bid for anxiety PRN    s/p P Afib RVR now in NSR  Eliquis 5mg bid   Metoprolol ER 200mg   ECHO  given nocturnal bradycardia, asymptomatic, reducing metoprolol to 150mg  Cont to monitor    HLD  Atorvastatin 20mg     Tobacco abuse   Nicotine patch ordered   Cessation advised     Supportive   DVT prophylaxis: Eliquis   Diet: DASH     Plan of care discussed with patient     Dispo- grp home  LOS- 2 days

## 2022-03-09 NOTE — PROGRESS NOTE ADULT - PROBLEM SELECTOR PLAN 4
- [id number]= 2 (HF, HTN)  - Continue Metoprolol 200mg oral daily for rate and rhythm control  - Continue Eliquis 5mg oral 2x daily, monitor daily CBC, Plt count and for acute bleeds

## 2022-03-10 ENCOUNTER — TRANSCRIPTION ENCOUNTER (OUTPATIENT)
Age: 62
End: 2022-03-10

## 2022-03-10 VITALS — DIASTOLIC BLOOD PRESSURE: 78 MMHG | SYSTOLIC BLOOD PRESSURE: 126 MMHG | HEART RATE: 74 BPM

## 2022-03-10 PROCEDURE — 0225U NFCT DS DNA&RNA 21 SARSCOV2: CPT

## 2022-03-10 PROCEDURE — 85025 COMPLETE CBC W/AUTO DIFF WBC: CPT

## 2022-03-10 PROCEDURE — 84484 ASSAY OF TROPONIN QUANT: CPT

## 2022-03-10 PROCEDURE — 96365 THER/PROPH/DIAG IV INF INIT: CPT

## 2022-03-10 PROCEDURE — 93005 ELECTROCARDIOGRAM TRACING: CPT

## 2022-03-10 PROCEDURE — 80061 LIPID PANEL: CPT

## 2022-03-10 PROCEDURE — 74176 CT ABD & PELVIS W/O CONTRAST: CPT | Mod: MA

## 2022-03-10 PROCEDURE — 81001 URINALYSIS AUTO W/SCOPE: CPT

## 2022-03-10 PROCEDURE — 84436 ASSAY OF TOTAL THYROXINE: CPT

## 2022-03-10 PROCEDURE — 93306 TTE W/DOPPLER COMPLETE: CPT

## 2022-03-10 PROCEDURE — 80076 HEPATIC FUNCTION PANEL: CPT

## 2022-03-10 PROCEDURE — 82550 ASSAY OF CK (CPK): CPT

## 2022-03-10 PROCEDURE — 71045 X-RAY EXAM CHEST 1 VIEW: CPT

## 2022-03-10 PROCEDURE — 85730 THROMBOPLASTIN TIME PARTIAL: CPT

## 2022-03-10 PROCEDURE — 80048 BASIC METABOLIC PNL TOTAL CA: CPT

## 2022-03-10 PROCEDURE — 96375 TX/PRO/DX INJ NEW DRUG ADDON: CPT

## 2022-03-10 PROCEDURE — 84100 ASSAY OF PHOSPHORUS: CPT

## 2022-03-10 PROCEDURE — 96366 THER/PROPH/DIAG IV INF ADDON: CPT

## 2022-03-10 PROCEDURE — 83036 HEMOGLOBIN GLYCOSYLATED A1C: CPT

## 2022-03-10 PROCEDURE — 80307 DRUG TEST PRSMV CHEM ANLYZR: CPT

## 2022-03-10 PROCEDURE — 83735 ASSAY OF MAGNESIUM: CPT

## 2022-03-10 PROCEDURE — 36415 COLL VENOUS BLD VENIPUNCTURE: CPT

## 2022-03-10 PROCEDURE — 99239 HOSP IP/OBS DSCHRG MGMT >30: CPT

## 2022-03-10 PROCEDURE — 99285 EMERGENCY DEPT VISIT HI MDM: CPT | Mod: 25

## 2022-03-10 PROCEDURE — 83880 ASSAY OF NATRIURETIC PEPTIDE: CPT

## 2022-03-10 PROCEDURE — 84443 ASSAY THYROID STIM HORMONE: CPT

## 2022-03-10 PROCEDURE — 85610 PROTHROMBIN TIME: CPT

## 2022-03-10 PROCEDURE — 80053 COMPREHEN METABOLIC PANEL: CPT

## 2022-03-10 PROCEDURE — 82962 GLUCOSE BLOOD TEST: CPT

## 2022-03-10 RX ORDER — SACUBITRIL AND VALSARTAN 24; 26 MG/1; MG/1
1 TABLET, FILM COATED ORAL
Qty: 0 | Refills: 0 | DISCHARGE

## 2022-03-10 RX ORDER — SACUBITRIL AND VALSARTAN 24; 26 MG/1; MG/1
1 TABLET, FILM COATED ORAL
Qty: 60 | Refills: 0
Start: 2022-03-10 | End: 2022-04-08

## 2022-03-10 RX ORDER — HYDRALAZINE HCL 50 MG
3 TABLET ORAL
Qty: 0 | Refills: 0 | DISCHARGE

## 2022-03-10 RX ORDER — HYDRALAZINE HCL 50 MG
1 TABLET ORAL
Qty: 90 | Refills: 0
Start: 2022-03-10 | End: 2022-04-08

## 2022-03-10 RX ORDER — METOPROLOL TARTRATE 50 MG
2 TABLET ORAL
Qty: 60 | Refills: 0
Start: 2022-03-10 | End: 2022-04-08

## 2022-03-10 RX ORDER — METOPROLOL TARTRATE 50 MG
2 TABLET ORAL
Qty: 0 | Refills: 0 | DISCHARGE

## 2022-03-10 RX ORDER — ATORVASTATIN CALCIUM 80 MG/1
1 TABLET, FILM COATED ORAL
Qty: 30 | Refills: 0
Start: 2022-03-10 | End: 2022-04-08

## 2022-03-10 RX ORDER — AMLODIPINE BESYLATE 2.5 MG/1
1 TABLET ORAL
Qty: 0 | Refills: 0 | DISCHARGE

## 2022-03-10 RX ORDER — AMLODIPINE BESYLATE 2.5 MG/1
1 TABLET ORAL
Qty: 30 | Refills: 0
Start: 2022-03-10 | End: 2022-04-08

## 2022-03-10 RX ORDER — CLONAZEPAM 1 MG
1 TABLET ORAL
Qty: 4 | Refills: 0
Start: 2022-03-10

## 2022-03-10 RX ORDER — ATORVASTATIN CALCIUM 80 MG/1
1 TABLET, FILM COATED ORAL
Qty: 0 | Refills: 0 | DISCHARGE

## 2022-03-10 RX ORDER — APIXABAN 2.5 MG/1
1 TABLET, FILM COATED ORAL
Qty: 60 | Refills: 0
Start: 2022-03-10 | End: 2022-04-08

## 2022-03-10 RX ORDER — SERTRALINE 25 MG/1
1 TABLET, FILM COATED ORAL
Qty: 0 | Refills: 0 | DISCHARGE

## 2022-03-10 RX ORDER — CLONAZEPAM 1 MG
1 TABLET ORAL
Qty: 0 | Refills: 0 | DISCHARGE

## 2022-03-10 RX ORDER — FUROSEMIDE 40 MG
1 TABLET ORAL
Qty: 0 | Refills: 0 | DISCHARGE

## 2022-03-10 RX ORDER — FUROSEMIDE 40 MG
1 TABLET ORAL
Qty: 30 | Refills: 0
Start: 2022-03-10 | End: 2022-04-08

## 2022-03-10 RX ORDER — SERTRALINE 25 MG/1
1 TABLET, FILM COATED ORAL
Qty: 60 | Refills: 0
Start: 2022-03-10 | End: 2022-04-08

## 2022-03-10 RX ADMIN — Medication 2 MILLIGRAM(S): at 00:59

## 2022-03-10 RX ADMIN — Medication 100 MILLIGRAM(S): at 13:24

## 2022-03-10 RX ADMIN — SACUBITRIL AND VALSARTAN 1 TABLET(S): 24; 26 TABLET, FILM COATED ORAL at 05:57

## 2022-03-10 RX ADMIN — Medication 100 MILLIGRAM(S): at 05:47

## 2022-03-10 RX ADMIN — APIXABAN 5 MILLIGRAM(S): 2.5 TABLET, FILM COATED ORAL at 05:46

## 2022-03-10 RX ADMIN — Medication 1 TABLET(S): at 13:23

## 2022-03-10 RX ADMIN — SERTRALINE 25 MILLIGRAM(S): 25 TABLET, FILM COATED ORAL at 05:46

## 2022-03-10 RX ADMIN — Medication 1 MILLIGRAM(S): at 10:11

## 2022-03-10 RX ADMIN — Medication 150 MILLIGRAM(S): at 05:46

## 2022-03-10 RX ADMIN — Medication 650 MILLIGRAM(S): at 10:05

## 2022-03-10 RX ADMIN — Medication 40 MILLIGRAM(S): at 05:46

## 2022-03-10 RX ADMIN — Medication 2 MILLIGRAM(S): at 05:59

## 2022-03-10 RX ADMIN — Medication 1 PATCH: at 13:23

## 2022-03-10 RX ADMIN — Medication 1 MILLIGRAM(S): at 13:24

## 2022-03-10 RX ADMIN — AMLODIPINE BESYLATE 10 MILLIGRAM(S): 2.5 TABLET ORAL at 05:46

## 2022-03-10 NOTE — DISCHARGE NOTE NURSING/CASE MANAGEMENT/SOCIAL WORK - PATIENT PORTAL LINK FT
You can access the FollowMyHealth Patient Portal offered by [facility] by registering at the following website: [email or web address]. By joining [employer]’s FollowMyHealth portal, you will also be able to view your health information using other applications (apps) compatible with our system.

## 2022-03-10 NOTE — DISCHARGE NOTE PROVIDER - NSDCMRMEDTOKEN_GEN_ALL_CORE_FT
amLODIPine 10 mg oral tablet: 1 tab(s) orally once a day  atorvastatin 20 mg oral tablet: 1 tab(s) orally once a day  clonazePAM 0.5 mg oral tablet: 1 tab(s) orally 2 times a day MDD:2  Eliquis 5 mg oral tablet: 1 tab(s) orally 2 times a day   Entresto 49 mg-51 mg oral tablet: 1 tab(s) orally 2 times a day  furosemide 40 mg oral tablet: 1 tab(s) orally once a day  hydrALAZINE 100 mg oral tablet: 1 tab(s) orally 3 times a day  metoprolol succinate 100 mg oral tablet, extended release: 2 tab(s) orally once a day  Multiple Vitamins oral tablet: 1 tab(s) orally once a day  sertraline 25 mg oral tablet: 1 tab(s) orally 2 times a day MDD:2

## 2022-03-10 NOTE — DISCHARGE NOTE PROVIDER - HOSPITAL COURSE
60 y/o male with PMH of HTN, HLD, afib on Eliquis, CHFpEF, EtOH abuse came to the ED complaining of palpitation and difficulty breathing. Patient noted associated dizziness, "funny feeling in my chest"; said the symptoms were the same as when he got rapid afib. Patient noted orthopnea and nausea. He has not been taken his medication for few weeks because he ran out.  Acute on chronic CHFpEF due to medication non-compliance - resolved  s/p Hypertensive urgency, now controlled  s/p P Afib RVR now in NSR  EtOH abuse     Medically stable and agreeable with discharge and follow up plan. Patient was advised to return to ED if any symptoms occur or worsen.  time 48 mins

## 2022-03-10 NOTE — DISCHARGE NOTE NURSING/CASE MANAGEMENT/SOCIAL WORK - NSDCPEFALRISK_GEN_ALL_CORE
For information on Fall & Injury Prevention, visit: [email or web address] OR  [email or web address] OR  https://www.cdc.gov/steadi/patient.html

## 2022-03-10 NOTE — DISCHARGE NOTE NURSING/CASE MANAGEMENT/SOCIAL WORK - NSDCFUADDAPPT_GEN_ALL_CORE_FT
**** you have  an  appointment  at  [facility] on Tuesday March 15th at 10: 00   am ***  [address] [facility] [facility]  [address]  [employer]  will arrive at your home  at 9:25am ;  [place]  [place]  [id number]  call for  ride  back  at ;  and notify   Michael  that  this is you new  address please ***

## 2022-03-10 NOTE — DISCHARGE NOTE PROVIDER - NSDCFUADDAPPT_GEN_ALL_CORE_FT
**** you have  an  appointment  at  [facility] on Tuesday March 15th at 10: 00   am ***  [address] [address] [facility]  [address]  [employer]  will arrive at your home  at 9:25am ;  [place]  NUMBER IS  [id number]  call for  ride  back  at ;  and notify   Michael  that  this is you new  address please ***

## 2022-03-10 NOTE — DISCHARGE NOTE PROVIDER - CARE PROVIDER_API CALL
cardiology judith,   Phone: (   )    -  Fax: (   )    -  Follow Up Time:     PMD at [facility],   Phone: (   )    -  Fax: (   )    -  Follow Up Time:

## 2022-03-10 NOTE — DISCHARGE NOTE PROVIDER - ATTENDING DISCHARGE PHYSICAL EXAMINATION:
Vital Signs Last 24 Hrs  T(C): 36.8 (03-10-22 @ 10:11), Max: 36.8 (03-09-22 @ 16:33)  T(F): 98.2 (03-10-22 @ 10:11), Max: 98.3 (03-09-22 @ 16:33)  HR: 74 (03-10-22 @ 13:24) (62 - 77)  BP: 126/78 (03-10-22 @ 13:24) (126/78 - 180/98)  BP(mean): --  RR: 17 (03-10-22 @ 10:11) (17 - 18)  SpO2: 98% (03-10-22 @ 10:11) (97% - 98%)    GENERAL: Comfortable, Obese, lying in bed  HEAD:  Atraumatic, Normocephalic  EYES: EOMI, PERRLA, conjunctiva and sclera clear  ENT: Moist mucous membranes,  No lesions  NECK: Supple, No JVD, Normal thyroid  NERVOUS SYSTEM:  Alert & Oriented X 3, Motor Strength 5/5 B/L upper and lower extremities  CHEST/LUNG: CTA bilaterally; No rales, rhonchi, wheezing, or rubs  HEART: Regular rate and rhythm; No murmurs, rubs, or gallops  ABDOMEN: Soft, Nontender, Nondistended; Bowel sounds present  EXTREMITIES:  2+ Peripheral Pulses, No clubbing, cyanosis, or edema  SKIN: No rashes or lesions

## 2022-03-10 NOTE — DISCHARGE NOTE PROVIDER - NSDCCPCAREPLAN_GEN_ALL_CORE_FT
PRINCIPAL DISCHARGE DIAGNOSIS  Diagnosis: Acute heart failure  Assessment and Plan of Treatment:       SECONDARY DISCHARGE DIAGNOSES  Diagnosis: Paroxysmal atrial fibrillation  Assessment and Plan of Treatment:     Diagnosis: Hypertensive urgency  Assessment and Plan of Treatment:     Diagnosis: Alcoholism  Assessment and Plan of Treatment:

## 2022-03-10 NOTE — DISCHARGE NOTE PROVIDER - PROVIDER TOKENS
FREE:[LAST:[cardiology [facility]pati],PHONE:[(   )    -],FAX:[(   )    -]],FREE:[LAST:[PMD at [facility]],PHONE:[(   )    -],FAX:[(   )    -]]

## 2022-03-30 NOTE — DISCHARGE NOTE PROVIDER - ATTENDING DISCHARGE PHYSICAL EXAM:
Attending Discharge Physical Examination: Griseofulvin Counseling:  I discussed with the patient the risks of griseofulvin including but not limited to photosensitivity, cytopenia, liver damage, nausea/vomiting and severe allergy.  The patient understands that this medication is best absorbed when taken with a fatty meal (e.g., ice cream or french fries).

## 2022-05-01 NOTE — ED ADULT NURSE NOTE - NS ED NURSE LEVEL OF CONSCIOUSNESS AFFECT
85 year old gentleman with a PMH DM, HTN, HLD who has been followed for the past 6 months for foot wounds and leg pain.      EKG -  A sense V paced PVC's  Echo - Mild LV dysfunction mild aortic stenosis    1) Post-op assessment   -pt has h/o moderate LV dysfunction as per echo 2017, no chest pains 2d echo now shows mild LV dysfunction  -12 lead EKG ok,  PPM interrogated  -s/p BKA     2) HTN  -controlled  -c/w metoprolol  -continue to monitor BP    3) Chronic systolic CHF   - hold metolazone   -c/w PO lasix 20mg BID  -monitor I&Os    4) ?Atrial fib   -c/w  coumadin bridge   -monitor INR     5) KARLOS  -improving  -continue to hold losartan and metolazone  -f/u renal   Calm

## 2022-05-23 ENCOUNTER — APPOINTMENT (OUTPATIENT)
Dept: CARDIOLOGY | Facility: CLINIC | Age: 62
End: 2022-05-23
Payer: MEDICARE

## 2022-05-23 ENCOUNTER — NON-APPOINTMENT (OUTPATIENT)
Age: 62
End: 2022-05-23

## 2022-05-23 VITALS
SYSTOLIC BLOOD PRESSURE: 188 MMHG | DIASTOLIC BLOOD PRESSURE: 108 MMHG | RESPIRATION RATE: 14 BRPM | BODY MASS INDEX: 33.35 KG/M2 | HEART RATE: 103 BPM | HEIGHT: 67.5 IN | WEIGHT: 215 LBS

## 2022-05-23 DIAGNOSIS — Z00.00 ENCOUNTER FOR GENERAL ADULT MEDICAL EXAMINATION W/OUT ABNORMAL FINDINGS: ICD-10-CM

## 2022-05-23 DIAGNOSIS — U07.1 COVID-19: ICD-10-CM

## 2022-05-23 DIAGNOSIS — Z86.79 PERSONAL HISTORY OF OTHER DISEASES OF THE CIRCULATORY SYSTEM: ICD-10-CM

## 2022-05-23 DIAGNOSIS — Z86.59 PERSONAL HISTORY OF OTHER MENTAL AND BEHAVIORAL DISORDERS: ICD-10-CM

## 2022-05-23 DIAGNOSIS — Z86.69 PERSONAL HISTORY OF OTHER DISEASES OF THE NERVOUS SYSTEM AND SENSE ORGANS: ICD-10-CM

## 2022-05-23 DIAGNOSIS — Z86.73 PERSONAL HISTORY OF TRANSIENT ISCHEMIC ATTACK (TIA), AND CEREBRAL INFARCTION W/OUT RESIDUAL DEFICITS: ICD-10-CM

## 2022-05-23 DIAGNOSIS — Z72.89 OTHER PROBLEMS RELATED TO LIFESTYLE: ICD-10-CM

## 2022-05-23 DIAGNOSIS — Z86.39 PERSONAL HISTORY OF OTHER ENDOCRINE, NUTRITIONAL AND METABOLIC DISEASE: ICD-10-CM

## 2022-05-23 DIAGNOSIS — F10.10 ALCOHOL ABUSE, UNCOMPLICATED: ICD-10-CM

## 2022-05-23 DIAGNOSIS — F17.210 NICOTINE DEPENDENCE, CIGARETTES, UNCOMPLICATED: ICD-10-CM

## 2022-05-23 PROCEDURE — 93000 ELECTROCARDIOGRAM COMPLETE: CPT

## 2022-05-23 PROCEDURE — 99214 OFFICE O/P EST MOD 30 MIN: CPT

## 2022-05-23 RX ORDER — METOPROLOL TARTRATE 50 MG/1
50 TABLET, FILM COATED ORAL
Refills: 0 | Status: DISCONTINUED | COMMUNITY

## 2022-05-23 RX ORDER — CLONAZEPAM 0.5 MG/1
0.5 TABLET ORAL
Refills: 0 | Status: ACTIVE | COMMUNITY

## 2022-05-23 RX ORDER — METOPROLOL TARTRATE 50 MG/1
50 TABLET, FILM COATED ORAL
Refills: 0 | Status: DISCONTINUED | COMMUNITY
End: 2022-05-23

## 2022-05-23 RX ORDER — NICOTINE POLACRILEX 2 MG/1
2 GUM, CHEWING ORAL
Qty: 90 | Refills: 3 | Status: ACTIVE | COMMUNITY
Start: 1900-01-01 | End: 1900-01-01

## 2022-05-23 RX ORDER — SERTRALINE 25 MG/1
25 TABLET, FILM COATED ORAL
Refills: 0 | Status: DISCONTINUED | COMMUNITY
End: 2022-05-23

## 2022-05-23 RX ORDER — FOLIC ACID 1 MG/1
1 TABLET ORAL DAILY
Refills: 0 | Status: ACTIVE | COMMUNITY

## 2022-05-23 RX ORDER — SERTRALINE 25 MG/1
25 TABLET, FILM COATED ORAL
Refills: 0 | Status: DISCONTINUED | COMMUNITY

## 2022-06-07 ENCOUNTER — NON-APPOINTMENT (OUTPATIENT)
Age: 62
End: 2022-06-07

## 2022-07-05 ENCOUNTER — APPOINTMENT (OUTPATIENT)
Dept: CARDIOLOGY | Facility: CLINIC | Age: 62
End: 2022-07-05

## 2022-08-01 ENCOUNTER — RX RENEWAL (OUTPATIENT)
Age: 62
End: 2022-08-01

## 2022-09-21 ENCOUNTER — RX RENEWAL (OUTPATIENT)
Age: 62
End: 2022-09-21

## 2022-09-22 ENCOUNTER — RX RENEWAL (OUTPATIENT)
Age: 62
End: 2022-09-22

## 2022-09-23 ENCOUNTER — RX RENEWAL (OUTPATIENT)
Age: 62
End: 2022-09-23

## 2022-12-23 ENCOUNTER — RX RENEWAL (OUTPATIENT)
Age: 62
End: 2022-12-23

## 2022-12-27 NOTE — ED ADULT TRIAGE NOTE - BMI (KG/M2)
Chief complaint:   Chief Complaint   Patient presents with   • Mouth/Lip Problem     Patient has had a bump above his front tooth and is experiencing pain       Vitals:  Visit Vitals  /72   Pulse 98   Temp 98.3 °F (36.8 °C)   Resp 14   Ht 6' 1\" (1.854 m)   Wt 77.1 kg (170 lb)   SpO2 100%   BMI 22.43 kg/m²       HISTORY OF PRESENT ILLNESS     The patient is a 18-year-old gentleman who presents to the clinic for evaluation of dental pain.  Patient states he had a routine dental cleaning last week and was told he had cavities, but unsure in which teeth.  He began to experience pain involving his left upper central incisor.  Woke up yesterday with a localized area of swelling which has since appeared to have ruptured.  Reports sensitivity to this tooth.  Has avoided hot cold beverages to avoid discomfort.  States he was scheduled to follow-up with dentist today, however dental office canceled.  Denies fevers, chills, nausea, vomiting.  Patient states he is otherwise feeling well.  Denies acute trauma to upper teeth.  Does report history of injury to left central incisor last year and states tooth was \"cemented back together.\"        Other significant problems:  There are no problems to display for this patient.      PAST MEDICAL, FAMILY AND SOCIAL HISTORY     Medications:  Current Outpatient Medications   Medication Sig Dispense Refill   • amoxicillin-clavulanate (Augmentin) 875-125 MG per tablet Take 1 tablet by mouth every 12 hours. 20 tablet 0     No current facility-administered medications for this visit.       Allergies:  ALLERGIES:  No Known Allergies    Past Medical  History/Surgeries:  Past Medical History:   Diagnosis Date   • Eczema        No past surgical history on file.    Family History:  Family History   Problem Relation Age of Onset   • Hypertension Maternal Grandfather        Social History:  Social History     Tobacco Use   • Smoking status: Never   • Smokeless tobacco: Never   Substance Use  Topics   • Alcohol use: No     Alcohol/week: 0.0 standard drinks       REVIEW OF SYSTEMS     Review of Systems    PHYSICAL EXAM     Physical Exam GENERAL:  Pleasant, well nourished, well developed, 18 year old, male, alert and oriented x3 and appears to be in no acute distress.  The patient is non-ill appearing.  Patient seems to provide a reliable history.  SKIN:  Warm, dry to touch with good skin turgor.  Capillary refill is less than 2 seconds.  HEENT:  Head is atraumatic, normocephalic.    Throat:  Mucous membranes pink, moist.  Tongue normal.  Lips well hydrated.  Raised, tender lesion noted along the gumline superior to the left central incisor.  There is active purulent drainage.  Tooth is tender with percussion.  Upper lip with mild localized swelling.  No overlying skin changes, erythema or induration.  No facial swelling.  NECK:  Supple with full range of motion.  No palpable cervical adenopathy on exam.             ASSESSMENT/PLAN     1. Dental infection    - Augmentin as prescribed  - dental balls as directed  - Education.  Reassurance.  Soft diet for comfort.  -warm saltwater rinses.  -Tylenol/ibuprofen p.r.n. pain relief rest.  Close monitoring.  Follow-up with dentist for further evaluation.  Agreed understand plan.  No further questions at this time.  Seek medical evaluation immediately for new or worsening symptoms.    The provisional diagnosis that the patient is discharged with today was based on the history taken, presenting symptoms, physical exam, and/or any ancillary testing. If new symptoms occur or worsen, patient should seek immediate medical attention for re-evaluation. ER parameters were reviewed. See the After Visit Summary for additional instructions, follow-up plans and/or emergency room precautions discussed with the patient.     Patient (or parent/guardian if applicable) was in agreement with treatment and discharge plan, appropriately stable at time of discharge from urgent care  clinic.     I am under the supervision of Dr.Justine Mortensen    The 21st Century Cures Act makes medical notes like these available to patients in the interest of transparency. However, be advised this is a medical document. It is intended as peer to peer communication. It is written in medical language and may contain abbreviations or verbiage that are unfamiliar to the general public. It may appear blunt or direct. Medical documents are intended to carry relevant information, facts as evident, and the clinical opinion of the provider.      36.8

## 2023-01-25 ENCOUNTER — APPOINTMENT (OUTPATIENT)
Dept: CARDIOLOGY | Facility: CLINIC | Age: 63
End: 2023-01-25

## 2023-03-21 ENCOUNTER — RX RENEWAL (OUTPATIENT)
Age: 63
End: 2023-03-21

## 2023-04-14 ENCOUNTER — APPOINTMENT (OUTPATIENT)
Dept: CARDIOLOGY | Facility: CLINIC | Age: 63
End: 2023-04-14

## 2023-06-08 ENCOUNTER — RX RENEWAL (OUTPATIENT)
Age: 63
End: 2023-06-08

## 2023-06-28 ENCOUNTER — NON-APPOINTMENT (OUTPATIENT)
Age: 63
End: 2023-06-28

## 2023-06-28 ENCOUNTER — APPOINTMENT (OUTPATIENT)
Dept: CARDIOLOGY | Facility: CLINIC | Age: 63
End: 2023-06-28
Payer: MEDICARE

## 2023-06-28 VITALS
RESPIRATION RATE: 16 BRPM | BODY MASS INDEX: 35.83 KG/M2 | WEIGHT: 231 LBS | HEART RATE: 75 BPM | SYSTOLIC BLOOD PRESSURE: 220 MMHG | HEIGHT: 67.5 IN | DIASTOLIC BLOOD PRESSURE: 120 MMHG

## 2023-06-28 VITALS — DIASTOLIC BLOOD PRESSURE: 120 MMHG | SYSTOLIC BLOOD PRESSURE: 220 MMHG

## 2023-06-28 DIAGNOSIS — R94.31 ABNORMAL ELECTROCARDIOGRAM [ECG] [EKG]: ICD-10-CM

## 2023-06-28 DIAGNOSIS — R06.09 OTHER FORMS OF DYSPNEA: ICD-10-CM

## 2023-06-28 DIAGNOSIS — E78.00 PURE HYPERCHOLESTEROLEMIA, UNSPECIFIED: ICD-10-CM

## 2023-06-28 DIAGNOSIS — I10 ESSENTIAL (PRIMARY) HYPERTENSION: ICD-10-CM

## 2023-06-28 DIAGNOSIS — R42 DIZZINESS AND GIDDINESS: ICD-10-CM

## 2023-06-28 DIAGNOSIS — I48.0 PAROXYSMAL ATRIAL FIBRILLATION: ICD-10-CM

## 2023-06-28 PROCEDURE — 99215 OFFICE O/P EST HI 40 MIN: CPT

## 2023-06-28 PROCEDURE — 93000 ELECTROCARDIOGRAM COMPLETE: CPT

## 2023-06-28 RX ORDER — ELECTROLYTES/DEXTROSE
SOLUTION, ORAL ORAL
Refills: 0 | Status: DISCONTINUED | COMMUNITY
End: 2023-06-28

## 2023-06-28 RX ORDER — FAMOTIDINE 20 MG/1
20 TABLET, FILM COATED ORAL DAILY
Refills: 0 | Status: DISCONTINUED | COMMUNITY
End: 2023-06-28

## 2023-06-28 RX ORDER — NICOTINE POLACRILEX 2 MG/1
2 GUM, CHEWING BUCCAL
Qty: 100 | Refills: 1 | Status: DISCONTINUED | COMMUNITY
Start: 2022-08-01 | End: 2023-06-28

## 2023-06-28 NOTE — PHYSICAL EXAM
[No Acute Distress] : no acute distress [Normal Conjunctiva] : normal conjunctiva [Normal Venous Pressure] : normal venous pressure [No Carotid Bruit] : no carotid bruit [Normal S1, S2] : normal S1, S2 [No Rub] : no rub [No Gallop] : no gallop [Murmur] : murmur [Clear Lung Fields] : clear lung fields [Good Air Entry] : good air entry [No Respiratory Distress] : no respiratory distress  [Soft] : abdomen soft [Non Tender] : non-tender [No Masses/organomegaly] : no masses/organomegaly [Normal Gait] : normal gait [No Edema] : no edema [No Cyanosis] : no cyanosis [No Clubbing] : no clubbing [No Rash] : no rash [No Skin Lesions] : no skin lesions [Moves all extremities] : moves all extremities [No Focal Deficits] : no focal deficits [Normal Speech] : normal speech [Alert and Oriented] : alert and oriented [Normal memory] : normal memory [de-identified] : Pleasant but somewhat disheveled [de-identified] : Grade I/VI systolic murmur

## 2023-06-28 NOTE — REASON FOR VISIT
[FreeTextEntry1] : The patient is a 62-year-old gentleman who came to our office for cardiac consultation last year in May 2022 with chronic history for hypertension, hyperlipidemia, and apparently, alcoholism and tobacco addiction.  In the past several months prior to his consultation with us, he had been hospitalized at [facility] March 2022, and prior to that apparently at [facility] for intermittent periods of syncope "possibly blackouts" which was not clear;\par \par During that time, we had reviewed records from [facility], where he apparently had a history of paroxysmal atrial fibrillation and falling and syncope and arrived intoxicated.  He had been placed on Eliquis 5 mg BID during the hospital course and subsequently converted back to NSR.  Transthoracic Echocardiogram at that time showed improved LV systolic function with LVEF in the range of 55-60%.  Patient stated that prior to his in years past, he had "cardiomyopathy" and at one time was on Entresto which was discontinued when they found his ejection fraction improved;\par \par He was off alcohol last year and had a sponsor when he came for his cardiac consultation, unfortunately, he's back to drinking some but reports having only 1 large beer few days per week.  He's also smoking well over 1 pack of cigarettes daily;\par \par Once again, he's back today for checkup after running out of his prescription antihypertensive medications for at least two weeks now and presents today with severely uncontrolled hypertension (220/120).  He reports getting shortness of breath on exertion and also lightheadedness periodically;\par \par Fortunately, there's been no associated chest pain, orthopnea, PND, palpitations, syncope, edema.

## 2023-06-28 NOTE — REVIEW OF SYSTEMS
[Dyspnea on exertion] : dyspnea during exertion [Negative] : Heme/Lymph [FreeTextEntry5] : Intermittent lightheadedness

## 2023-06-28 NOTE — ASSESSMENT
[FreeTextEntry1] : EKG 06/28/2023:  The EKG illustrates sinus rhythm, rate of 75 bpm, IVCD pattern, left atrial enlargement pattern, poor R wave progression V1 to V4.  \par \par In summary, Mr. Cervantes is a 62-year-old gentleman who is somewhat of a poor historian but does have a history of alcoholism and multiple syncopal episodes in the recent past which was likely due to alcoholism.  He was off EtOH and had a sponsor last year, but unfortunately, he's back to drinking some beers throughout the week.  He's also smoking at least 1 pack of cigarettes per day.  He "ran out of his medications" two weeks ago and presents with uncontrolled hypertension. He's now c/o worsening shortness of breath on exertion and noticing an increase in lightheadedness as well.  He states he did apparently have a history of cardiomyopathy, possibly EtOH induced which had improved by was of his echocardiogram last year in March 2022.  He is adopted and unaware of his family history;\par \par \par PLAN:\par \par 1).  I have taken the time to  him on the importance of not running out of his medications and will refill all of his antihypertensive medications.  \par \par Counseled patient on smoking cessation and to stop drinking EtOH with having history of alcoholism. \par \par 2).  Recommend he complete a 2 day Nuclear Stress Test in near future to assess for any signs of coronary ischemia.\par \par He is to complete a Transthoracic Echocardiogram in near future to assess overall cardiac function, valvulopathy, and to assess for cardiac remodelling / cardiomyopathy.\par \par 3).  Diet and lifestyle modification discussed including low sodium, low fat and low carbohydrate weight reducing diet.  Patient is to implement aerobic exercise regimen few days per week pending results of stress test. \par \par 4).  Immediately go to the emergency department and/or report any untoward symptoms to our office. \par \par 5).  Follow up with our office in 6-8 weeks or PRN.  Will recheck blood pressure and discuss results from cardiac testing.

## 2023-06-28 NOTE — HISTORY OF PRESENT ILLNESS
[FreeTextEntry1] : Had Carotid Duplex at [facility] in March 2022 showing normal flow bilaterally without any significant stenosis; \par \par Patient is adopted and is unaware of his family history; \par \par After further questioning, he reports having a history for stroke many years ago and has lost some vision in his right eye;

## 2023-07-06 ENCOUNTER — APPOINTMENT (OUTPATIENT)
Dept: CARDIOLOGY | Facility: CLINIC | Age: 63
End: 2023-07-06

## 2023-07-07 ENCOUNTER — APPOINTMENT (OUTPATIENT)
Dept: CARDIOLOGY | Facility: CLINIC | Age: 63
End: 2023-07-07

## 2023-07-26 ENCOUNTER — APPOINTMENT (OUTPATIENT)
Dept: CARDIOLOGY | Facility: CLINIC | Age: 63
End: 2023-07-26

## 2023-07-28 NOTE — CONSULT NOTE ADULT - NSTELEHEALTH_GEN_ALL_CORE
ED call back, pt states, \"I'm doing pretty well. My symptoms haven't returned.\" No further questions or concerns expressed when asked.      No

## 2023-08-01 ENCOUNTER — APPOINTMENT (OUTPATIENT)
Dept: CARDIOLOGY | Facility: CLINIC | Age: 63
End: 2023-08-01

## 2023-08-18 NOTE — ED ADULT NURSE NOTE - TEMPLATE LIST FOR HEAD TO TOE ASSESSMENT
Post-Op Assessment Note    CV Status:  Stable  Pain Score: 0    Pain management: adequate     Mental Status:  Sleepy and somnolent   Hydration Status:  Euvolemic and stable   PONV Controlled:  Controlled   Airway Patency:  Patent      Post Op Vitals Reviewed: Yes      Staff: CRNA         No notable events documented.     /69 (08/18/23 1247)    Temp 98.6 °F (37 °C) (08/18/23 1247)    Pulse 102 (08/18/23 1247)   Resp 16 (08/18/23 1247)    SpO2 91 % (08/18/23 1247)
Cardiac

## 2023-10-03 ENCOUNTER — RX RENEWAL (OUTPATIENT)
Age: 63
End: 2023-10-03

## 2023-10-16 ENCOUNTER — APPOINTMENT (OUTPATIENT)
Dept: CARDIOLOGY | Facility: CLINIC | Age: 63
End: 2023-10-16

## 2024-01-23 ENCOUNTER — APPOINTMENT (OUTPATIENT)
Dept: CARDIOLOGY | Facility: CLINIC | Age: 64
End: 2024-01-23
Payer: SELF-PAY

## 2024-01-23 PROCEDURE — SNS01: CPT

## 2024-01-29 NOTE — ED ADULT TRIAGE NOTE - ESI TRIAGE ACUITY LEVEL, MLM
[facility]   ORTHOPAEDIC SURGERY AND SPORTS MEDICINE  DATE OF VISIT:   01/29/24  New Knee Patient     Referring Provider:   Alfredito Rodriguez MD  [address] [place] [address]    CHIEF COMPLAINT:   Chief Complaint   Patient presents with    Knee Pain     Pt is here today for right knee pain, h/o for right total knee arthroplasty with Dr. Almazan in June of 2022. Pt states her knee has been painful ever since.         HPI:      Matilde Mcpherson is a 82 y.o. year old female who is seen today  for evaluation of right knee pain.  Patient reports undergoing a total knee replacement by Dr. Almazan in June 2022.  Patient states that following the procedure pain never subsided and she has had generalized pain throughout the entirety of the leg.  She denies mechanical symptoms or feelings of instability.  Reports persistent achy pain while at rest and with ambulation.  States that she has had numerous imaging, and most recently a genicular nerve block which has provided no relief of symptoms.  She is not currently working, patient is retired.      PAST MEDICAL HISTORY  Past Medical History:   Diagnosis Date    Arthritis     History of double vision     both  eyes    Hyperlipidemia     Hypertension     no meds    Joint pain     Low back pain     PONV (postoperative nausea and vomiting)     with general anesthesia    Raynaud's disease     Right knee pain     TIA (transient ischemic attack) 2012    no deficits       PAST SURGICAL HISTORY  Past Surgical History:   Procedure Laterality Date    APPENDECTOMY      COLONOSCOPY  09/26/2017    EYE SURGERY Bilateral     cataract removal w lense implants    HC INJECT OTHER PERPHRL NERV Bilateral 09/12/2019    BILATERAL SACROILIAC JOINT INJECTION performed by Francisco Hernandez MD at [facility] OR    LAPAROSCOPY  1997    ENDOMETRIOSIS    NERVE BLOCK Bilateral 10/12/2020    BILATERAL SACROILIAC JOINT INJECTION UNDER FLUOROSCOPY performed by Francisco Hernandez MD 
3
2

## 2024-02-29 ENCOUNTER — APPOINTMENT (OUTPATIENT)
Dept: CARDIOLOGY | Facility: CLINIC | Age: 64
End: 2024-02-29

## 2024-03-13 NOTE — ED ADULT NURSE NOTE - NS_SISCREENINGSR_GEN_ALL_ED
Vascular lab preliminary.  Left leg venous scan completed.  Left peroneal veins are non visualized.  No evidence for DVT, SVT, or reflux noted at this time.  Final report pending.  
Negative

## 2024-03-14 ENCOUNTER — APPOINTMENT (OUTPATIENT)
Dept: CARDIOLOGY | Facility: CLINIC | Age: 64
End: 2024-03-14

## 2024-03-22 NOTE — INPATIENT CERTIFICATION FOR MEDICARE PATIENTS - RISKS OF ADVERSE EVENTS
Patient returned call, rescheduled 04/01/2024.    Patient was scheduled for  LUMBAR MBB L3-L5 on  04/01/2024 to arrive at 10:00 AM.  Discussed in detail the procedure instructions. Patient verbalized understanding, and a copy of the instructions was sent to verified mailing address on file.   Encouraged to call if any questions or concerns arise before procedure.     alcohol abuse/Concern for cardiopulmonary deterioration/Other:

## 2024-04-30 NOTE — ED ADULT NURSE REASSESSMENT NOTE - NURSING NEURO ORIENTATION
Please call if you any questions.    [facility]  [address] [place]   [address]  [phone]        Vira Weston,   
oriented to person, place and time

## 2024-05-14 RX ORDER — AMLODIPINE BESYLATE 10 MG/1
10 TABLET ORAL DAILY
Qty: 14 | Refills: 0 | Status: ACTIVE | COMMUNITY
Start: 2023-10-03 | End: 1900-01-01

## 2024-05-14 RX ORDER — METOPROLOL TARTRATE 50 MG/1
50 TABLET, FILM COATED ORAL
Qty: 28 | Refills: 0 | Status: ACTIVE | COMMUNITY
Start: 2022-12-23 | End: 1900-01-01

## 2024-05-14 RX ORDER — ATORVASTATIN CALCIUM 20 MG/1
20 TABLET, FILM COATED ORAL
Qty: 14 | Refills: 0 | Status: ACTIVE | COMMUNITY
Start: 2023-06-08 | End: 1900-01-01

## 2024-05-14 RX ORDER — LISINOPRIL 40 MG/1
40 TABLET ORAL DAILY
Qty: 14 | Refills: 0 | Status: ACTIVE | COMMUNITY
Start: 2023-10-03 | End: 1900-01-01

## 2024-05-14 RX ORDER — CLONIDINE HYDROCHLORIDE 0.1 MG/1
0.1 TABLET ORAL
Qty: 28 | Refills: 0 | Status: ACTIVE | COMMUNITY
Start: 2023-03-21 | End: 1900-01-01

## 2024-05-14 RX ORDER — FUROSEMIDE 40 MG/1
40 TABLET ORAL
Qty: 14 | Refills: 0 | Status: ACTIVE | COMMUNITY
Start: 2023-10-03 | End: 1900-01-01

## 2024-05-14 RX ORDER — APIXABAN 5 MG/1
5 TABLET, FILM COATED ORAL
Qty: 28 | Refills: 0 | Status: ACTIVE | COMMUNITY
Start: 2023-06-08 | End: 1900-01-01

## 2024-05-14 RX ORDER — HYDRALAZINE HYDROCHLORIDE 25 MG/1
25 TABLET ORAL 3 TIMES DAILY
Qty: 270 | Refills: 0 | Status: ACTIVE | COMMUNITY
Start: 2023-10-03 | End: 1900-01-01

## 2024-05-20 ENCOUNTER — APPOINTMENT (OUTPATIENT)
Dept: CARDIOLOGY | Facility: CLINIC | Age: 64
End: 2024-05-20

## 2024-05-23 ENCOUNTER — APPOINTMENT (OUTPATIENT)
Dept: CARDIOLOGY | Facility: CLINIC | Age: 64
End: 2024-05-23

## 2024-08-30 ENCOUNTER — APPOINTMENT (OUTPATIENT)
Dept: CARDIOLOGY | Facility: CLINIC | Age: 64
End: 2024-08-30

## 2024-09-06 ENCOUNTER — NON-APPOINTMENT (OUTPATIENT)
Age: 64
End: 2024-09-06

## 2024-09-11 ENCOUNTER — APPOINTMENT (OUTPATIENT)
Dept: CARDIOLOGY | Facility: CLINIC | Age: 64
End: 2024-09-11

## 2024-09-17 ENCOUNTER — RX RENEWAL (OUTPATIENT)
Age: 64
End: 2024-09-17

## 2024-09-27 ENCOUNTER — EMERGENCY (EMERGENCY)
Facility: HOSPITAL | Age: 64
LOS: 1 days | Discharge: LEFT BEFORE DISCHARGE | End: 2024-09-27
Attending: STUDENT IN AN ORGANIZED HEALTH CARE EDUCATION/TRAINING PROGRAM
Payer: MEDICARE

## 2024-09-27 VITALS
RESPIRATION RATE: 20 BRPM | HEART RATE: 63 BPM | SYSTOLIC BLOOD PRESSURE: 169 MMHG | DIASTOLIC BLOOD PRESSURE: 97 MMHG | OXYGEN SATURATION: 99 %

## 2024-09-27 VITALS
TEMPERATURE: 99 F | SYSTOLIC BLOOD PRESSURE: 202 MMHG | WEIGHT: 244.93 LBS | HEIGHT: 67 IN | RESPIRATION RATE: 22 BRPM | HEART RATE: 78 BPM | OXYGEN SATURATION: 90 % | DIASTOLIC BLOOD PRESSURE: 107 MMHG

## 2024-09-27 LAB
ALBUMIN SERPL ELPH-MCNC: 3.7 G/DL — SIGNIFICANT CHANGE UP (ref 3.3–5.2)
ALP SERPL-CCNC: 51 U/L — SIGNIFICANT CHANGE UP (ref 40–120)
ALT FLD-CCNC: 8 U/L — SIGNIFICANT CHANGE UP
ANION GAP SERPL CALC-SCNC: 11 MMOL/L — SIGNIFICANT CHANGE UP (ref 5–17)
APTT BLD: 29.9 SEC — SIGNIFICANT CHANGE UP (ref 24.5–35.6)
AST SERPL-CCNC: 12 U/L — SIGNIFICANT CHANGE UP
BASOPHILS # BLD AUTO: 0.07 K/UL — SIGNIFICANT CHANGE UP (ref 0–0.2)
BASOPHILS NFR BLD AUTO: 0.9 % — SIGNIFICANT CHANGE UP (ref 0–2)
BILIRUB SERPL-MCNC: 0.4 MG/DL — SIGNIFICANT CHANGE UP (ref 0.4–2)
BUN SERPL-MCNC: 22.8 MG/DL — HIGH (ref 8–20)
CALCIUM SERPL-MCNC: 9.2 MG/DL — SIGNIFICANT CHANGE UP (ref 8.4–10.5)
CHLORIDE SERPL-SCNC: 102 MMOL/L — SIGNIFICANT CHANGE UP (ref 96–108)
CO2 SERPL-SCNC: 28 MMOL/L — SIGNIFICANT CHANGE UP (ref 22–29)
CREAT SERPL-MCNC: 1.34 MG/DL — HIGH (ref 0.5–1.3)
EGFR: 59 ML/MIN/1.73M2 — LOW
EOSINOPHIL # BLD AUTO: 0.21 K/UL — SIGNIFICANT CHANGE UP (ref 0–0.5)
EOSINOPHIL NFR BLD AUTO: 2.6 % — SIGNIFICANT CHANGE UP (ref 0–6)
ETHANOL SERPL-MCNC: <10 MG/DL — SIGNIFICANT CHANGE UP (ref 0–9)
GLUCOSE SERPL-MCNC: 103 MG/DL — HIGH (ref 70–99)
HCT VFR BLD CALC: 40.3 % — SIGNIFICANT CHANGE UP (ref 39–50)
HGB BLD-MCNC: 13.4 G/DL — SIGNIFICANT CHANGE UP (ref 13–17)
IMM GRANULOCYTES NFR BLD AUTO: 0.6 % — SIGNIFICANT CHANGE UP (ref 0–0.9)
INR BLD: 0.97 RATIO — SIGNIFICANT CHANGE UP (ref 0.85–1.16)
LYMPHOCYTES # BLD AUTO: 1.11 K/UL — SIGNIFICANT CHANGE UP (ref 1–3.3)
LYMPHOCYTES # BLD AUTO: 13.9 % — SIGNIFICANT CHANGE UP (ref 13–44)
MCHC RBC-ENTMCNC: 27.9 PG — SIGNIFICANT CHANGE UP (ref 27–34)
MCHC RBC-ENTMCNC: 33.3 GM/DL — SIGNIFICANT CHANGE UP (ref 32–36)
MCV RBC AUTO: 84 FL — SIGNIFICANT CHANGE UP (ref 80–100)
MONOCYTES # BLD AUTO: 0.68 K/UL — SIGNIFICANT CHANGE UP (ref 0–0.9)
MONOCYTES NFR BLD AUTO: 8.5 % — SIGNIFICANT CHANGE UP (ref 2–14)
NEUTROPHILS # BLD AUTO: 5.89 K/UL — SIGNIFICANT CHANGE UP (ref 1.8–7.4)
NEUTROPHILS NFR BLD AUTO: 73.5 % — SIGNIFICANT CHANGE UP (ref 43–77)
PLATELET # BLD AUTO: 307 K/UL — SIGNIFICANT CHANGE UP (ref 150–400)
POTASSIUM SERPL-MCNC: 3.8 MMOL/L — SIGNIFICANT CHANGE UP (ref 3.5–5.3)
POTASSIUM SERPL-SCNC: 3.8 MMOL/L — SIGNIFICANT CHANGE UP (ref 3.5–5.3)
PROT SERPL-MCNC: 6.6 G/DL — SIGNIFICANT CHANGE UP (ref 6.6–8.7)
PROTHROM AB SERPL-ACNC: 11.3 SEC — SIGNIFICANT CHANGE UP (ref 9.9–13.4)
RBC # BLD: 4.8 M/UL — SIGNIFICANT CHANGE UP (ref 4.2–5.8)
RBC # FLD: 13.9 % — SIGNIFICANT CHANGE UP (ref 10.3–14.5)
SODIUM SERPL-SCNC: 141 MMOL/L — SIGNIFICANT CHANGE UP (ref 135–145)
TROPONIN T, HIGH SENSITIVITY RESULT: 46 NG/L — SIGNIFICANT CHANGE UP (ref 0–51)
WBC # BLD: 8.01 K/UL — SIGNIFICANT CHANGE UP (ref 3.8–10.5)
WBC # FLD AUTO: 8.01 K/UL — SIGNIFICANT CHANGE UP (ref 3.8–10.5)

## 2024-09-27 PROCEDURE — 70498 CT ANGIOGRAPHY NECK: CPT | Mod: MC

## 2024-09-27 PROCEDURE — 85610 PROTHROMBIN TIME: CPT

## 2024-09-27 PROCEDURE — 96374 THER/PROPH/DIAG INJ IV PUSH: CPT | Mod: XU

## 2024-09-27 PROCEDURE — 0042T: CPT | Mod: MC

## 2024-09-27 PROCEDURE — 36415 COLL VENOUS BLD VENIPUNCTURE: CPT

## 2024-09-27 PROCEDURE — 99285 EMERGENCY DEPT VISIT HI MDM: CPT

## 2024-09-27 PROCEDURE — 70450 CT HEAD/BRAIN W/O DYE: CPT | Mod: MC

## 2024-09-27 PROCEDURE — 70496 CT ANGIOGRAPHY HEAD: CPT | Mod: MC

## 2024-09-27 PROCEDURE — 70498 CT ANGIOGRAPHY NECK: CPT | Mod: 26,MC

## 2024-09-27 PROCEDURE — 99285 EMERGENCY DEPT VISIT HI MDM: CPT | Mod: 25

## 2024-09-27 PROCEDURE — 84484 ASSAY OF TROPONIN QUANT: CPT

## 2024-09-27 PROCEDURE — 82962 GLUCOSE BLOOD TEST: CPT

## 2024-09-27 PROCEDURE — 85025 COMPLETE CBC W/AUTO DIFF WBC: CPT

## 2024-09-27 PROCEDURE — 94640 AIRWAY INHALATION TREATMENT: CPT

## 2024-09-27 PROCEDURE — 80053 COMPREHEN METABOLIC PANEL: CPT

## 2024-09-27 PROCEDURE — 70496 CT ANGIOGRAPHY HEAD: CPT | Mod: 26,MC

## 2024-09-27 PROCEDURE — 85730 THROMBOPLASTIN TIME PARTIAL: CPT

## 2024-09-27 PROCEDURE — 93005 ELECTROCARDIOGRAM TRACING: CPT

## 2024-09-27 PROCEDURE — 80307 DRUG TEST PRSMV CHEM ANLYZR: CPT

## 2024-09-27 PROCEDURE — 93010 ELECTROCARDIOGRAM REPORT: CPT

## 2024-09-27 RX ORDER — NICARDIPINE HCL 20 MG
5 CAPSULE ORAL
Qty: 40 | Refills: 0 | Status: ACTIVE | OUTPATIENT
Start: 2024-09-27 | End: 2025-08-26

## 2024-09-27 RX ORDER — IPRATROPIUM BROMIDE AND ALBUTEROL SULFATE .5; 3 MG/3ML; MG/3ML
3 SOLUTION RESPIRATORY (INHALATION) ONCE
Refills: 0 | Status: COMPLETED | OUTPATIENT
Start: 2024-09-27 | End: 2024-09-27

## 2024-09-27 RX ADMIN — IPRATROPIUM BROMIDE AND ALBUTEROL SULFATE 3 MILLILITER(S): .5; 3 SOLUTION RESPIRATORY (INHALATION) at 11:16

## 2024-09-27 RX ADMIN — Medication 25 MG/HR: at 09:48

## 2024-09-27 NOTE — ED PROVIDER NOTE - IMAGING STUDIES ADDITIONAL INFORMATION FREE TEXT
CT head and angio: no acute hemorrhage, no significant acute findings.  attending radiologist CORRY BENDER

## 2024-09-27 NOTE — ED PROVIDER NOTE - OBJECTIVE STATEMENT
63 yo M hx of CHF, HTN, HLD, presents to ED with SOB. Pt is AOX3, but poor historian, possibly under influence of alcohol. Pt states he feels SOB so called ambulance. In the ED, pt noted to be 90% RA, without increase in wob.    At 8:47, called by RN to evaluate pt at am bay for episode acute change in mental status witnessed by RN. Per RN, episode included aphasia and confusion (not being able to use his phone). CODE STROKE called at 8:55 after evaluation. Pt is not candidate for TNK given hypertension, low NIHSS and low supsicion for LVO.

## 2024-09-27 NOTE — ED ADULT TRIAGE NOTE - CHIEF COMPLAINT QUOTE
pt woke up feeling short of breath and chest tightness, since this morning, stated that he drinks everyday last drink this morning. hx of HTN, HLD, CHF. hasn't been taking his meds because he ran out and doesn't go to his doctors. pt in triage is refusing ekg at this time because he has to urinate. pt placed on oxygen in triage because of low oxygen level.

## 2024-09-27 NOTE — ED PROVIDER NOTE - PHYSICAL EXAMINATION
Lorraine Byrne MD Attending  GEN: Patient awake, NAD.   HEENT: normocephalic, atraumatic, EOMI, no scleral icterus,   CARDIAC: RRR, S1, S2   PULM: diffusely diminished breath sounds, no wheeze, rhonchi, rales.   ABD: obese, non tender.   MSK: Moving all extremities, 5/5 strength and full ROM in all extremities.     NEURO: A&Ox3, gait normal, non focal   SKIN: warm, dry, no rash.

## 2024-09-27 NOTE — ED PROVIDER NOTE - NSFOLLOWUPINSTRUCTIONS_ED_ALL_ED_FT
** You declined to complete your workup in the emergency room and  are signing out against medical advice.    ** Follow up with your primary care doctor in the next 72 hours.      ** Go to the nearest Emergency Department if you experience any new or concerning symptoms, such as:   - worsening pain  - chest pain  - difficulty breathing  - passing out  - unable to eat or drink  - unable to move or feel part of your body  - fever, chills

## 2024-09-27 NOTE — ED ADULT NURSE NOTE - NSFALLHARMRISKINTERV_ED_ALL_ED

## 2024-09-27 NOTE — ED PROVIDER NOTE - PROGRESS NOTE DETAILS
Lorraine Byrne MD, Attending  Pt adamantly refusing CXR, is AOx3, ambulating without assistance, has capacity. Pt wants to be discharged despite being advised STRONGLY and REPEATEDLY to stay in ED untill his workup is complete. A&Ox3, speaking clearly and coherently, demonstrates decision-making capacity.  Pt understands that the risks of leaving include, but not limited to, death and/or disability; pt accepts these risks.  Pt understands to be seen by PMD in 1-2 days; pt understands to return as soon as possible for persistence or any worsening of symptoms.     Pt verbalized understandign that he could worsen or die by leaving against medical advice.   AMA papers signed. Lorraine Byrne MD, Attending  Pt repeatedly not cooperating at main CT for code stroke imaging. Pt requires significant redirection to comply with workup.

## 2024-09-27 NOTE — ED ADULT NURSE REASSESSMENT NOTE - NS ED NURSE REASSESS COMMENT FT1
pt while in ambulance after Triage stated that he is having a period of aphasia. MD called to bedside and CODE STROKE called.

## 2024-09-27 NOTE — ED PROVIDER NOTE - CLINICAL SUMMARY MEDICAL DECISION MAKING FREE TEXT BOX
Lorraine Byrne MD, Attending  ddx includes, but is not limited to the following: TIA, ich, intoxication, electrolyte derangement, acute infection  plan: code stroke labs, reassess for need for additional infectious and cardiac labs.  update: see progress notes.   ----

## 2024-09-27 NOTE — ED PROVIDER NOTE - PATIENT PORTAL LINK FT
You can access the FollowMyHealth Patient Portal offered by [facility] by registering at the following website: [email or web address]. By joining [facility]’s FollowMyHealth portal, you will also be able to view your health information using other applications (apps) compatible with our system.

## 2024-10-30 ENCOUNTER — INPATIENT (INPATIENT)
Facility: HOSPITAL | Age: 64
LOS: 6 days | Discharge: NURSING, REHAB OR LONG-TERM CARE | DRG: 204 | End: 2024-11-06
Attending: STUDENT IN AN ORGANIZED HEALTH CARE EDUCATION/TRAINING PROGRAM | Admitting: STUDENT IN AN ORGANIZED HEALTH CARE EDUCATION/TRAINING PROGRAM
Payer: MEDICARE

## 2024-10-30 VITALS
RESPIRATION RATE: 22 BRPM | HEART RATE: 75 BPM | DIASTOLIC BLOOD PRESSURE: 109 MMHG | TEMPERATURE: 98 F | SYSTOLIC BLOOD PRESSURE: 171 MMHG | HEIGHT: 67 IN | WEIGHT: 179.9 LBS | OXYGEN SATURATION: 95 %

## 2024-10-30 LAB
ALBUMIN SERPL ELPH-MCNC: 3.8 G/DL — SIGNIFICANT CHANGE UP (ref 3.3–5.2)
ALP SERPL-CCNC: 55 U/L — SIGNIFICANT CHANGE UP (ref 40–120)
ALT FLD-CCNC: 9 U/L — SIGNIFICANT CHANGE UP
ANION GAP SERPL CALC-SCNC: 14 MMOL/L — SIGNIFICANT CHANGE UP (ref 5–17)
APTT BLD: 29.2 SEC — SIGNIFICANT CHANGE UP (ref 24.5–35.6)
AST SERPL-CCNC: 15 U/L — SIGNIFICANT CHANGE UP
BASOPHILS # BLD AUTO: 0.08 K/UL — SIGNIFICANT CHANGE UP (ref 0–0.2)
BASOPHILS NFR BLD AUTO: 0.8 % — SIGNIFICANT CHANGE UP (ref 0–2)
BILIRUB SERPL-MCNC: 0.3 MG/DL — LOW (ref 0.4–2)
BUN SERPL-MCNC: 27.2 MG/DL — HIGH (ref 8–20)
CALCIUM SERPL-MCNC: 9.2 MG/DL — SIGNIFICANT CHANGE UP (ref 8.4–10.5)
CHLORIDE SERPL-SCNC: 103 MMOL/L — SIGNIFICANT CHANGE UP (ref 96–108)
CO2 SERPL-SCNC: 26 MMOL/L — SIGNIFICANT CHANGE UP (ref 22–29)
CREAT SERPL-MCNC: 1.38 MG/DL — HIGH (ref 0.5–1.3)
EGFR: 57 ML/MIN/1.73M2 — LOW
EOSINOPHIL # BLD AUTO: 0.34 K/UL — SIGNIFICANT CHANGE UP (ref 0–0.5)
EOSINOPHIL NFR BLD AUTO: 3.5 % — SIGNIFICANT CHANGE UP (ref 0–6)
ETHANOL SERPL-MCNC: <10 MG/DL — SIGNIFICANT CHANGE UP (ref 0–9)
FLUAV AG NPH QL: SIGNIFICANT CHANGE UP
FLUBV AG NPH QL: SIGNIFICANT CHANGE UP
GLUCOSE SERPL-MCNC: 100 MG/DL — HIGH (ref 70–99)
HCT VFR BLD CALC: 40.5 % — SIGNIFICANT CHANGE UP (ref 39–50)
HGB BLD-MCNC: 13.4 G/DL — SIGNIFICANT CHANGE UP (ref 13–17)
IMM GRANULOCYTES NFR BLD AUTO: 0.3 % — SIGNIFICANT CHANGE UP (ref 0–0.9)
INR BLD: 1.03 RATIO — SIGNIFICANT CHANGE UP (ref 0.85–1.16)
LIDOCAIN IGE QN: 26 U/L — SIGNIFICANT CHANGE UP (ref 22–51)
LYMPHOCYTES # BLD AUTO: 1.19 K/UL — SIGNIFICANT CHANGE UP (ref 1–3.3)
LYMPHOCYTES # BLD AUTO: 12.4 % — LOW (ref 13–44)
MCHC RBC-ENTMCNC: 28 PG — SIGNIFICANT CHANGE UP (ref 27–34)
MCHC RBC-ENTMCNC: 33.1 G/DL — SIGNIFICANT CHANGE UP (ref 32–36)
MCV RBC AUTO: 84.7 FL — SIGNIFICANT CHANGE UP (ref 80–100)
MONOCYTES # BLD AUTO: 0.8 K/UL — SIGNIFICANT CHANGE UP (ref 0–0.9)
MONOCYTES NFR BLD AUTO: 8.3 % — SIGNIFICANT CHANGE UP (ref 2–14)
NEUTROPHILS # BLD AUTO: 7.19 K/UL — SIGNIFICANT CHANGE UP (ref 1.8–7.4)
NEUTROPHILS NFR BLD AUTO: 74.7 % — SIGNIFICANT CHANGE UP (ref 43–77)
NT-PROBNP SERPL-SCNC: 3840 PG/ML — HIGH (ref 0–300)
PLATELET # BLD AUTO: 248 K/UL — SIGNIFICANT CHANGE UP (ref 150–400)
POTASSIUM SERPL-MCNC: 3.7 MMOL/L — SIGNIFICANT CHANGE UP (ref 3.5–5.3)
POTASSIUM SERPL-SCNC: 3.7 MMOL/L — SIGNIFICANT CHANGE UP (ref 3.5–5.3)
PROT SERPL-MCNC: 6.6 G/DL — SIGNIFICANT CHANGE UP (ref 6.6–8.7)
PROTHROM AB SERPL-ACNC: 11.6 SEC — SIGNIFICANT CHANGE UP (ref 9.9–13.4)
RBC # BLD: 4.78 M/UL — SIGNIFICANT CHANGE UP (ref 4.2–5.8)
RBC # FLD: 13.9 % — SIGNIFICANT CHANGE UP (ref 10.3–14.5)
RSV RNA NPH QL NAA+NON-PROBE: SIGNIFICANT CHANGE UP
SARS-COV-2 RNA SPEC QL NAA+PROBE: SIGNIFICANT CHANGE UP
SODIUM SERPL-SCNC: 143 MMOL/L — SIGNIFICANT CHANGE UP (ref 135–145)
TROPONIN T, HIGH SENSITIVITY RESULT: 65 NG/L — HIGH (ref 0–51)
WBC # BLD: 9.63 K/UL — SIGNIFICANT CHANGE UP (ref 3.8–10.5)
WBC # FLD AUTO: 9.63 K/UL — SIGNIFICANT CHANGE UP (ref 3.8–10.5)

## 2024-10-30 PROCEDURE — 99285 EMERGENCY DEPT VISIT HI MDM: CPT

## 2024-10-30 PROCEDURE — 71045 X-RAY EXAM CHEST 1 VIEW: CPT | Mod: 26

## 2024-10-30 RX ORDER — LORAZEPAM 2 MG
2 TABLET ORAL
Refills: 0 | Status: DISCONTINUED | OUTPATIENT
Start: 2024-10-30 | End: 2024-10-31

## 2024-10-30 RX ORDER — FUROSEMIDE 40 MG
40 TABLET ORAL ONCE
Refills: 0 | Status: DISCONTINUED | OUTPATIENT
Start: 2024-10-30 | End: 2024-10-30

## 2024-10-30 RX ORDER — THIAMINE HCL 100 MG
100 TABLET ORAL ONCE
Refills: 0 | Status: COMPLETED | OUTPATIENT
Start: 2024-10-30 | End: 2024-10-30

## 2024-10-30 RX ORDER — FUROSEMIDE 40 MG
20 TABLET ORAL ONCE
Refills: 0 | Status: COMPLETED | OUTPATIENT
Start: 2024-10-30 | End: 2024-10-30

## 2024-10-30 RX ORDER — FOLIC ACID 1 MG/1
1 TABLET ORAL DAILY
Refills: 0 | Status: DISCONTINUED | OUTPATIENT
Start: 2024-10-30 | End: 2024-11-01

## 2024-10-30 RX ORDER — B-COMPLEX WITH VITAMIN C
1 VIAL (ML) INJECTION DAILY
Refills: 0 | Status: DISCONTINUED | OUTPATIENT
Start: 2024-10-30 | End: 2024-11-06

## 2024-10-30 RX ADMIN — Medication 20 MILLIGRAM(S): at 22:51

## 2024-10-30 RX ADMIN — FOLIC ACID 1 MILLIGRAM(S): 1 TABLET ORAL at 22:50

## 2024-10-30 RX ADMIN — Medication 1 TABLET(S): at 22:50

## 2024-10-30 RX ADMIN — Medication 100 MILLIGRAM(S): at 22:51

## 2024-10-30 RX ADMIN — Medication 2 MILLIGRAM(S): at 22:50

## 2024-10-30 NOTE — ED PROVIDER NOTE - PROGRESS NOTE DETAILS
Patient initially requested AMA, stating he can't find the bathroom and would like to go home. Changed his mind and would like to stay. Discussed with admitting hospitalist. Admit to medicine tele bed, CIWA with librium taper, [place] cardiology consult requested. Patient initially requested AMA, stating he can't find the bathroom and would like to go home. Changed his mind and would like to stay. Discussed with admitting hospitalist, requested CIWA with librium taper and [place] cardiology consult. Admit to medicine tele bed. Patient initially requested AMA, stating he can't find the bathroom and would like to go home. Changed his mind and would like to stay. Discussed with admitting hospitalist, requested CIWA with librium taper and [place] cardiology consult for CHF exacerbation. Admit to medicine tele bed. Patient requested AMA, Patient is alert and oriented times three. No acute distress. Discussed patient's current condition and need for further diagnostic evaluation. Patient wants to leave and understands leaving against medical advise. Risks, benefits and alternatives explained. Advised to follow up with physician tomorrow or return immediately for any change in symptoms. Patient understand that they can return to the ER at any time to continue evaluation. Patient verbalizes understanding to the above instructions. Patient requested to leave AMA, Patient is alert and oriented times three. No acute distress. Discussed patient's current condition and need for further diagnostic evaluation. Patient wants to leave and understands leaving against medical advise. Risks, benefits and alternatives explained. Advised to follow up with physician tomorrow or return immediately for any change in symptoms. Patient understand that they can return to the ER at any time to continue evaluation. Patient verbalizes understanding to the above instructions. Patient decided to stay. Will admit to medicine tele bed as above.

## 2024-10-30 NOTE — ED PROVIDER NOTE - NSFOLLOWUPINSTRUCTIONS_ED_ALL_ED_FT
- Return to the emergency room if your symptoms worsens or persists - Return to the emergency room if your symptoms worsens or persists  - Please follow up with your primary care physician - Return to the emergency room if your symptoms worsens or persists  - Please follow up with your primary care physician    Congestive Heart Failure (CHF)    Congestive heart failure is a chronic condition in which the heart has trouble pumping blood. In some cases of heart failure, fluid may back up into your lungs or you may have swelling (edema) in your lower legs. There are many causes of heart failure including high blood pressure, coronary artery disease, abnormal heart valves, heart muscle disease, lung disease, diabetes, etc. Symptoms include shortness of breath with activity or when lying flat, cough, swelling of the legs, fatigue, or increased urination during the night.     Treatment is aimed at managing the symptoms of heart failure and may include lifestyle changes, medications, or surgical procedures. Take medicines only as directed by your health care provider and do not stop unless instructed to do so. Eat heart-healthy foods with low or no trans/saturated fats, cholesterol and salt. Weigh yourself every day for early recognition of fluid accumulation.    SEEK IMMEDIATE MEDICAL CARE IF YOU HAVE ANY OF THE FOLLOWING SYMPTOMS: shortness of breath, change in mental status, chest pain, lightheadedness/dizziness/fainting, or worsening of symptoms including not being able to conduct normal physical activity.     Alcohol Abuse    Alcohol intoxication occurs when the amount of alcohol that a person has consumed impairs his or her ability to mentally and physically function. Chronic alcohol consumption can also lead to a variety of health issues including neurological disease, stomach disease, heart disease, liver disease, etc. Do not drive after drinking alcohol. Drinking enough alcohol to end up in an Emergency Room suggests you may have an alcohol abuse problem. Seek help at a drug addiction center.    SEEK IMMEDIATE MEDICAL CARE IF YOU HAVE ANY OF THE FOLLOWING SYMPTOMS: seizures, vomiting blood, blood in your stool, lightheadedness/dizziness, or becoming shaky to tremulous when you stop drinking. - You declined to complete your workup in the emergency room and  are signing out against medical advice  - Return to the emergency room if your symptoms worsens or persists  - Please follow up with your primary care physician    Congestive Heart Failure (CHF)    Congestive heart failure is a chronic condition in which the heart has trouble pumping blood. In some cases of heart failure, fluid may back up into your lungs or you may have swelling (edema) in your lower legs. There are many causes of heart failure including high blood pressure, coronary artery disease, abnormal heart valves, heart muscle disease, lung disease, diabetes, etc. Symptoms include shortness of breath with activity or when lying flat, cough, swelling of the legs, fatigue, or increased urination during the night.     Treatment is aimed at managing the symptoms of heart failure and may include lifestyle changes, medications, or surgical procedures. Take medicines only as directed by your health care provider and do not stop unless instructed to do so. Eat heart-healthy foods with low or no trans/saturated fats, cholesterol and salt. Weigh yourself every day for early recognition of fluid accumulation.    SEEK IMMEDIATE MEDICAL CARE IF YOU HAVE ANY OF THE FOLLOWING SYMPTOMS: shortness of breath, change in mental status, chest pain, lightheadedness/dizziness/fainting, or worsening of symptoms including not being able to conduct normal physical activity.     Alcohol Abuse    Alcohol intoxication occurs when the amount of alcohol that a person has consumed impairs his or her ability to mentally and physically function. Chronic alcohol consumption can also lead to a variety of health issues including neurological disease, stomach disease, heart disease, liver disease, etc. Do not drive after drinking alcohol. Drinking enough alcohol to end up in an Emergency Room suggests you may have an alcohol abuse problem. Seek help at a drug addiction center.    SEEK IMMEDIATE MEDICAL CARE IF YOU HAVE ANY OF THE FOLLOWING SYMPTOMS: seizures, vomiting blood, blood in your stool, lightheadedness/dizziness, or becoming shaky to tremulous when you stop drinking.

## 2024-10-30 NOTE — ED ADULT NURSE NOTE - OBJECTIVE STATEMENT
pt AOx4, breathing even and unlabored. assumed care 2157. pt presents to ED c/o SOB. PMH ETOH, HFpEF, HTN, presents with progress worsening shortness of breath and productive cough for a couple of months. Endorses orthopnea and PNH, sleeps with 3 pillows. Reports "weird feeling in my right chest." Not compliant with lasix, states "it makes me feel weird," last dose was at least a month ago. Reports 4-6 large beer every day for the past 4 months. Last drink was last night. Reports history of hospitalization for alcohol withdraw, no history of withdraw seizures. Feel like he is going through withdraw, anxious and diaphoresis. Has been to rehab, and is currently not interested in rehab, citing "rehab only fix short term". Denies fever, chills, rhinorrhea, headache, chest pain, papillations, n/v/d/c, LE swelling, or calf pain.

## 2024-10-30 NOTE — ED ADULT TRIAGE NOTE - CHIEF COMPLAINT QUOTE
Pt BIBA from home c/o SOB for a few hours.  Hx of CHF, has not taken his Lasix in a while, current smoker.  No home O2 use.  Placed on 4LNC to bring satts up from 88%.

## 2024-10-30 NOTE — ED PROVIDER NOTE - PATIENT PORTAL LINK FT
You can access the FollowMyHealth Patient Portal offered by [facility] by registering at the following website: [email or web address]. By joining [employer]’s FollowMyHealth portal, you will also be able to view your health information using other applications (apps) compatible with our system.

## 2024-10-30 NOTE — ED PROVIDER NOTE - OBJECTIVE STATEMENT
64y male with alcohol use disorder, HFpEF, HTN, presents with progress worsening shortness of breath for a couple of months. Not compliant with lasix, last     Also reports has been drinking 4-6 large beer every day for the past 4 months. Last drink was last night. Reports history of hospitalization for alcohol withdraw, no history of withdraw seizures. Has been to rehab, and is currently not interested in rehab, citing "rehab only fix short term". 64y male with alcohol use disorder, HFpEF, HTN, presents with progress worsening shortness of breath and productive cough for a couple of months. Endorses orthopnea and PNH, sleeps with 3 pillows. Reports "weird feeling in my right chest." Not compliant with lasix, last dose was at least a month ago. Also reports has been drinking 4-6 large beer every day for the past 4 months. Last drink was last night. Reports history of hospitalization for alcohol withdraw, no history of withdraw seizures. Feel like he is going through withdraw, anxious and diaphoresis. Has been to rehab, and is currently not interested in rehab, citing "rehab only fix short term". Denies fever, chills, rhinorrhea, headache, chest pain, papillations, n/v/d/c, LE swelling, or calf pain. 64y male with alcohol use disorder, HFpEF, HTN, presents with progress worsening shortness of breath and productive cough for a couple of months. Endorses orthopnea and PNH, sleeps with 3 pillows. Reports "weird feeling in my right chest." Not compliant with lasix, last dose was at least a month ago. Denies recent travel or sick contact. Also reports has been drinking 4-6 large beer every day for the past 4 months. Last drink was last night. Reports history of hospitalization for alcohol withdraw, no history of withdraw seizures. Feel like he is going through withdraw, anxious and diaphoresis. Has been to rehab, and is currently not interested in rehab, citing "rehab only fix short term". Denies fever, chills, rhinorrhea, headache, chest pain, papillations, n/v/d/c, LE swelling, or calf pain.

## 2024-10-30 NOTE — ED ADULT NURSE REASSESSMENT NOTE - NS ED NURSE REASSESS COMMENT FT1
report received. pt noncompliant on monitor. pt in tripod position. resident made aware and saw at bedside

## 2024-10-30 NOTE — ED ADULT TRIAGE NOTE - ESI TRIAGE ACUITY LEVEL, MLM
Case Management Assessment  Initial Evaluation    Date/Time of Evaluation: 8/1/2024 3:23 PM  Assessment Completed by: Laura Myers RN    If patient is discharged prior to next notation, then this note serves as note for discharge by case management.    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 Yes    Chart Reviewed: Yes      History Provided by: Patient  Patient Orientation: Alert and Oriented    Patient Cognition: Alert    Hospitalization in the last 30 days (Readmission):  No    If yes, Readmission Assessment in  Navigator will be completed.    Advance Directives:      Code Status: Full Code   Patient's Primary Decision Maker is: Legal Next of Kin      Discharge Planning:    Patient lives with: Other (Comment) (grand child) Type of Home: House  Primary Care Giver: Self  Patient Support Systems include: Family Members   Current Financial resources: Medicaid, Medicare  Current community resources: None  Current services prior to admission: Durable Medical Equipment            Current DME: [employer]            Type of Home Care services:  None    ADLS  Prior functional level: Independent in ADLs/IADLs  Current functional level: Independent in ADLs/IADLs    PT AM-PAC:   /24  OT AM-PAC:   /24    Family can provide assistance at DC: No  Would you like Case Management to discuss the discharge plan with any other family members/significant others, and if so, who? Yes  Plans to Return to Present Housing: Unknown at present  Other Identified Issues/Barriers to RETURNING to current housing: none  Potential Assistance needed at discharge: Other  2

## 2024-10-30 NOTE — ED PROVIDER NOTE - NS ED MD DISPO SPECIAL CONSIDERATION1
Plan:  1.  Continue physical therapy for pelvic floor rehab and biofeedback.   2. Continue bowel management per gastroenterology recommendations.    3.  Continue Oxybutynin at 2.5 ml by mouth 3 times daily.   4.  Increase Nitrofurantoin to 6.7 ml once daily at bedtime.   5.  Continue with regular toileting (every 2 hours), taking time to void, and drinking plenty of water.    6.  Follow up in urology in 6 months for a visit and repeat renal ultrasound.         Thank you for choosing [facility]. It was a pleasure to see you for your office visit today.     If you have any questions or scheduling needs during regular office hours, please call our [facility]: [phone]   If urgent concerns arise after hours, you can call [phone] and ask to speak to the pediatric specialist on call.   If you need to schedule Radiology tests, please call: [phone]  My Chart messages are for routine communication and questions and are usually answered within 48-72 hours. If you have an urgent concern or require sooner response, please call us.  Outside lab and imaging results should be faxed to [phone].  If you go to a lab outside of [facility] we will not automatically get those results. You will need to ask to have them faxed.       If you had any blood work, imaging or other tests completed today:  Normal test results will be mailed to your home address in a letter.  Abnormal results will be communicated to you via phone call/letter.  Please allow up to 1-2 weeks for processing and interpretation of most lab work.      
None

## 2024-10-30 NOTE — ED PROVIDER NOTE - CLINICAL SUMMARY MEDICAL DECISION MAKING FREE TEXT BOX
64y male with alcohol use disorder, HFpEF, HTN, presents with progress worsening shortness of breath and productive cough for a couple of months. Endorses orthopnea and PNH. Not compliant with lasix, last dose was at least a month ago. Also reports has been drinking 4-6 large beer every day for the past 4 months. Last drink was last night, No history of withdraw seizures.  Satting 88% on presentation, now >95% on 2L, hypertensive, tachypneic. Bilateral basilar crackles, no BLE edema. Place on CIWA protocl with ativan, Plan for labs, resp swab, BNP, trop, EKG, CXR. 20mg lasix given. Will reassess.

## 2024-10-30 NOTE — ED PROVIDER NOTE - PHYSICAL EXAMINATION
GENERAL: No acute distress, sitting in bed, disheveled   HEENT: Atraumatic, normocephalic, non-icteric  NECK: Supple, full range of motion, no observable masses  NEURO: A&Ox3, no focal deficits, moving all extremities spontaneously, no dysarthria, CN II-XII grossly intact  PSYCH: Normal affect, fluent speech  LUNGS: Speaking in full sentences, labored breathing, bilateral crackles in the bases, satting well on 2L  HEART: RRR, no murmur appreciated  ABD: Soft, non-tender, distended, fluid wave negative   EXTREMITIES: No BLE edema   SKIN: No rashes, ecchymosis, lacerations

## 2024-10-31 ENCOUNTER — RESULT REVIEW (OUTPATIENT)
Age: 64
End: 2024-10-31

## 2024-10-31 DIAGNOSIS — R06.02 SHORTNESS OF BREATH: ICD-10-CM

## 2024-10-31 LAB
ALBUMIN SERPL ELPH-MCNC: 3.9 G/DL — SIGNIFICANT CHANGE UP (ref 3.3–5.2)
ALP SERPL-CCNC: 57 U/L — SIGNIFICANT CHANGE UP (ref 40–120)
ALT FLD-CCNC: 9 U/L — SIGNIFICANT CHANGE UP
AMPHET UR-MCNC: NEGATIVE — SIGNIFICANT CHANGE UP
ANION GAP SERPL CALC-SCNC: 14 MMOL/L — SIGNIFICANT CHANGE UP (ref 5–17)
AST SERPL-CCNC: 13 U/L — SIGNIFICANT CHANGE UP
BARBITURATES UR SCN-MCNC: NEGATIVE — SIGNIFICANT CHANGE UP
BASOPHILS # BLD AUTO: 0.06 K/UL — SIGNIFICANT CHANGE UP (ref 0–0.2)
BASOPHILS NFR BLD AUTO: 0.7 % — SIGNIFICANT CHANGE UP (ref 0–2)
BENZODIAZ UR-MCNC: POSITIVE
BILIRUB SERPL-MCNC: 0.6 MG/DL — SIGNIFICANT CHANGE UP (ref 0.4–2)
BUN SERPL-MCNC: 22.1 MG/DL — HIGH (ref 8–20)
CALCIUM SERPL-MCNC: 9.2 MG/DL — SIGNIFICANT CHANGE UP (ref 8.4–10.5)
CHLORIDE SERPL-SCNC: 102 MMOL/L — SIGNIFICANT CHANGE UP (ref 96–108)
CO2 SERPL-SCNC: 27 MMOL/L — SIGNIFICANT CHANGE UP (ref 22–29)
COCAINE METAB.OTHER UR-MCNC: NEGATIVE — SIGNIFICANT CHANGE UP
CREAT SERPL-MCNC: 1.31 MG/DL — HIGH (ref 0.5–1.3)
EGFR: 60 ML/MIN/1.73M2 — SIGNIFICANT CHANGE UP
EOSINOPHIL # BLD AUTO: 0.23 K/UL — SIGNIFICANT CHANGE UP (ref 0–0.5)
EOSINOPHIL NFR BLD AUTO: 2.6 % — SIGNIFICANT CHANGE UP (ref 0–6)
FENTANYL UR QL SCN: NEGATIVE — SIGNIFICANT CHANGE UP
GAS PNL BLDA: SIGNIFICANT CHANGE UP
GLUCOSE SERPL-MCNC: 106 MG/DL — HIGH (ref 70–99)
HCT VFR BLD CALC: 42.9 % — SIGNIFICANT CHANGE UP (ref 39–50)
HGB BLD-MCNC: 14.3 G/DL — SIGNIFICANT CHANGE UP (ref 13–17)
IMM GRANULOCYTES NFR BLD AUTO: 0.5 % — SIGNIFICANT CHANGE UP (ref 0–0.9)
LYMPHOCYTES # BLD AUTO: 0.93 K/UL — LOW (ref 1–3.3)
LYMPHOCYTES # BLD AUTO: 10.6 % — LOW (ref 13–44)
MAGNESIUM SERPL-MCNC: 1.8 MG/DL — SIGNIFICANT CHANGE UP (ref 1.6–2.6)
MAGNESIUM SERPL-MCNC: 1.8 MG/DL — SIGNIFICANT CHANGE UP (ref 1.8–2.6)
MCHC RBC-ENTMCNC: 27.9 PG — SIGNIFICANT CHANGE UP (ref 27–34)
MCHC RBC-ENTMCNC: 33.3 G/DL — SIGNIFICANT CHANGE UP (ref 32–36)
MCV RBC AUTO: 83.8 FL — SIGNIFICANT CHANGE UP (ref 80–100)
METHADONE UR-MCNC: NEGATIVE — SIGNIFICANT CHANGE UP
MONOCYTES # BLD AUTO: 0.76 K/UL — SIGNIFICANT CHANGE UP (ref 0–0.9)
MONOCYTES NFR BLD AUTO: 8.7 % — SIGNIFICANT CHANGE UP (ref 2–14)
NEUTROPHILS # BLD AUTO: 6.76 K/UL — SIGNIFICANT CHANGE UP (ref 1.8–7.4)
NEUTROPHILS NFR BLD AUTO: 76.9 % — SIGNIFICANT CHANGE UP (ref 43–77)
OPIATES UR-MCNC: NEGATIVE — SIGNIFICANT CHANGE UP
PCP SPEC-MCNC: SIGNIFICANT CHANGE UP
PCP UR-MCNC: NEGATIVE — SIGNIFICANT CHANGE UP
PHOSPHATE SERPL-MCNC: 3.4 MG/DL — SIGNIFICANT CHANGE UP (ref 2.4–4.7)
PHOSPHATE SERPL-MCNC: 3.8 MG/DL — SIGNIFICANT CHANGE UP (ref 2.4–4.7)
PLATELET # BLD AUTO: 236 K/UL — SIGNIFICANT CHANGE UP (ref 150–400)
POTASSIUM SERPL-MCNC: 3.5 MMOL/L — SIGNIFICANT CHANGE UP (ref 3.5–5.3)
POTASSIUM SERPL-SCNC: 3.5 MMOL/L — SIGNIFICANT CHANGE UP (ref 3.5–5.3)
PROT SERPL-MCNC: 6.9 G/DL — SIGNIFICANT CHANGE UP (ref 6.6–8.7)
RBC # BLD: 5.12 M/UL — SIGNIFICANT CHANGE UP (ref 4.2–5.8)
RBC # FLD: 13.9 % — SIGNIFICANT CHANGE UP (ref 10.3–14.5)
SODIUM SERPL-SCNC: 143 MMOL/L — SIGNIFICANT CHANGE UP (ref 135–145)
THC UR QL: NEGATIVE — SIGNIFICANT CHANGE UP
TROPONIN T, HIGH SENSITIVITY RESULT: 60 NG/L — HIGH (ref 0–51)
TSH SERPL-MCNC: 3.71 UIU/ML — SIGNIFICANT CHANGE UP (ref 0.27–4.2)
WBC # BLD: 8.78 K/UL — SIGNIFICANT CHANGE UP (ref 3.8–10.5)
WBC # FLD AUTO: 8.78 K/UL — SIGNIFICANT CHANGE UP (ref 3.8–10.5)

## 2024-10-31 PROCEDURE — 93010 ELECTROCARDIOGRAM REPORT: CPT

## 2024-10-31 PROCEDURE — 99223 1ST HOSP IP/OBS HIGH 75: CPT

## 2024-10-31 PROCEDURE — 99223 1ST HOSP IP/OBS HIGH 75: CPT | Mod: GC

## 2024-10-31 PROCEDURE — 93306 TTE W/DOPPLER COMPLETE: CPT | Mod: 26

## 2024-10-31 RX ORDER — ENOXAPARIN SODIUM 40MG/0.4ML
80 SYRINGE (ML) SUBCUTANEOUS EVERY 12 HOURS
Refills: 0 | Status: DISCONTINUED | OUTPATIENT
Start: 2024-10-31 | End: 2024-11-01

## 2024-10-31 RX ORDER — LISINOPRIL 40 MG
0 TABLET ORAL
Qty: 0 | Refills: 0 | DISCHARGE

## 2024-10-31 RX ORDER — HYDROXYZINE HCL 25 MG
0 TABLET ORAL
Qty: 0 | Refills: 1 | DISCHARGE

## 2024-10-31 RX ORDER — CHLORDIAZEPOXIDE HCL 10 MG
CAPSULE ORAL
Refills: 0 | Status: DISCONTINUED | OUTPATIENT
Start: 2024-10-31 | End: 2024-10-31

## 2024-10-31 RX ORDER — ACETAMINOPHEN 500 MG
650 TABLET ORAL EVERY 6 HOURS
Refills: 0 | Status: DISCONTINUED | OUTPATIENT
Start: 2024-10-31 | End: 2024-11-06

## 2024-10-31 RX ORDER — DIAZEPAM 10 MG/1
20 FILM BUCCAL ONCE
Refills: 0 | Status: DISCONTINUED | OUTPATIENT
Start: 2024-10-31 | End: 2024-10-31

## 2024-10-31 RX ORDER — ONDANSETRON HYDROCHLORIDE 2 MG/ML
4 INJECTION, SOLUTION INTRAMUSCULAR; INTRAVENOUS EVERY 8 HOURS
Refills: 0 | Status: DISCONTINUED | OUTPATIENT
Start: 2024-10-31 | End: 2024-11-06

## 2024-10-31 RX ORDER — LORAZEPAM 2 MG
2 TABLET ORAL
Refills: 0 | Status: DISCONTINUED | OUTPATIENT
Start: 2024-10-31 | End: 2024-10-31

## 2024-10-31 RX ORDER — HYDRALAZINE HYDROCHLORIDE 50 MG/1
0 TABLET, FILM COATED ORAL
Qty: 0 | Refills: 0 | DISCHARGE

## 2024-10-31 RX ORDER — THIAMINE HCL 100 MG
100 TABLET ORAL DAILY
Refills: 0 | Status: DISCONTINUED | OUTPATIENT
Start: 2024-10-31 | End: 2024-11-01

## 2024-10-31 RX ORDER — POTASSIUM CHLORIDE 10 MEQ
10 TABLET, EXTENDED RELEASE ORAL
Refills: 0 | Status: COMPLETED | OUTPATIENT
Start: 2024-10-31 | End: 2024-10-31

## 2024-10-31 RX ORDER — AMLODIPINE BESYLATE 10 MG
0 TABLET ORAL
Qty: 0 | Refills: 0 | DISCHARGE

## 2024-10-31 RX ORDER — LABETALOL HCL 200 MG
10 TABLET ORAL ONCE
Refills: 0 | Status: COMPLETED | OUTPATIENT
Start: 2024-10-31 | End: 2024-10-31

## 2024-10-31 RX ORDER — FUROSEMIDE 40 MG
40 TABLET ORAL EVERY 12 HOURS
Refills: 0 | Status: DISCONTINUED | OUTPATIENT
Start: 2024-10-31 | End: 2024-11-03

## 2024-10-31 RX ORDER — DIAZEPAM 10 MG/1
10 FILM BUCCAL EVERY 6 HOURS
Refills: 0 | Status: DISCONTINUED | OUTPATIENT
Start: 2024-10-31 | End: 2024-10-31

## 2024-10-31 RX ORDER — DIAZEPAM 10 MG/1
10 FILM BUCCAL
Refills: 0 | Status: DISCONTINUED | OUTPATIENT
Start: 2024-10-31 | End: 2024-10-31

## 2024-10-31 RX ORDER — MAGNESIUM, ALUMINUM HYDROXIDE 200-200 MG
30 TABLET,CHEWABLE ORAL EVERY 4 HOURS
Refills: 0 | Status: DISCONTINUED | OUTPATIENT
Start: 2024-10-31 | End: 2024-11-06

## 2024-10-31 RX ORDER — HYDRALAZINE HYDROCHLORIDE 50 MG/1
10 TABLET, FILM COATED ORAL EVERY 4 HOURS
Refills: 0 | Status: DISCONTINUED | OUTPATIENT
Start: 2024-10-31 | End: 2024-11-03

## 2024-10-31 RX ORDER — METOPROLOL TARTRATE 50 MG
1 TABLET ORAL
Refills: 0 | DISCHARGE

## 2024-10-31 RX ORDER — NYSTATIN 100000 U/G
1 POWDER TOPICAL
Refills: 0 | Status: DISCONTINUED | OUTPATIENT
Start: 2024-10-31 | End: 2024-11-06

## 2024-10-31 RX ORDER — CLONIDINE HYDROCHLORIDE 0.2 MG/1
0 TABLET ORAL
Qty: 0 | Refills: 0 | DISCHARGE

## 2024-10-31 RX ORDER — MAGNESIUM SULFATE IN 0.9% NACL 2 G/50 ML
2 INTRAVENOUS SOLUTION, PIGGYBACK (ML) INTRAVENOUS ONCE
Refills: 0 | Status: COMPLETED | OUTPATIENT
Start: 2024-10-31 | End: 2024-10-31

## 2024-10-31 RX ORDER — CLONIDINE HYDROCHLORIDE 0.2 MG/1
1 TABLET ORAL
Refills: 0 | DISCHARGE

## 2024-10-31 RX ORDER — CLONIDINE HYDROCHLORIDE 0.2 MG/1
0.1 TABLET ORAL EVERY 4 HOURS
Refills: 0 | Status: DISCONTINUED | OUTPATIENT
Start: 2024-10-31 | End: 2024-11-02

## 2024-10-31 RX ORDER — ENOXAPARIN SODIUM 40MG/0.4ML
40 SYRINGE (ML) SUBCUTANEOUS EVERY 24 HOURS
Refills: 0 | Status: DISCONTINUED | OUTPATIENT
Start: 2024-10-31 | End: 2024-10-31

## 2024-10-31 RX ORDER — LISINOPRIL 40 MG
1 TABLET ORAL
Refills: 0 | DISCHARGE

## 2024-10-31 RX ORDER — DIAZEPAM 10 MG/1
FILM BUCCAL
Refills: 0 | Status: DISCONTINUED | OUTPATIENT
Start: 2024-10-31 | End: 2024-10-31

## 2024-10-31 RX ORDER — CHLORDIAZEPOXIDE HCL 10 MG
50 CAPSULE ORAL EVERY 6 HOURS
Refills: 0 | Status: DISCONTINUED | OUTPATIENT
Start: 2024-10-31 | End: 2024-10-31

## 2024-10-31 RX ORDER — CHLORDIAZEPOXIDE HCL 10 MG
50 CAPSULE ORAL
Refills: 0 | Status: DISCONTINUED | OUTPATIENT
Start: 2024-10-31 | End: 2024-10-31

## 2024-10-31 RX ORDER — MELATONIN 5 MG
3 TABLET ORAL AT BEDTIME
Refills: 0 | Status: DISCONTINUED | OUTPATIENT
Start: 2024-10-31 | End: 2024-11-06

## 2024-10-31 RX ORDER — FUROSEMIDE 40 MG
40 TABLET ORAL
Refills: 0 | Status: DISCONTINUED | OUTPATIENT
Start: 2024-10-31 | End: 2024-10-31

## 2024-10-31 RX ORDER — DEXMEDETOMIDINE HYDROCHLORIDE 400 UG/100ML
0.3 INJECTION, SOLUTION INTRAVENOUS
Qty: 400 | Refills: 0 | Status: DISCONTINUED | OUTPATIENT
Start: 2024-10-31 | End: 2024-11-02

## 2024-10-31 RX ORDER — HYDROXYZINE HCL 25 MG
1 TABLET ORAL
Refills: 0 | DISCHARGE

## 2024-10-31 RX ORDER — METOPROLOL TARTRATE 50 MG
0 TABLET ORAL
Qty: 0 | Refills: 3 | DISCHARGE

## 2024-10-31 RX ORDER — SERTRALINE HYDROCHLORIDE 50 MG/1
1 TABLET, FILM COATED ORAL
Refills: 0 | DISCHARGE

## 2024-10-31 RX ORDER — SERTRALINE HYDROCHLORIDE 50 MG/1
0 TABLET, FILM COATED ORAL
Qty: 0 | Refills: 1 | DISCHARGE

## 2024-10-31 RX ORDER — CHLORHEXIDINE GLUCONATE 40 MG/ML
1 SOLUTION TOPICAL
Refills: 0 | Status: DISCONTINUED | OUTPATIENT
Start: 2024-10-31 | End: 2024-11-06

## 2024-10-31 RX ORDER — APIXABAN 5 MG/1
0 TABLET, FILM COATED ORAL
Qty: 0 | Refills: 0 | DISCHARGE

## 2024-10-31 RX ORDER — NICARDIPINE HCL 30 MG
5 CAPSULE, EXTENDED RELEASE ORAL
Qty: 40 | Refills: 0 | Status: DISCONTINUED | OUTPATIENT
Start: 2024-10-31 | End: 2024-11-01

## 2024-10-31 RX ORDER — DIAZEPAM 10 MG/1
20 FILM BUCCAL EVERY 4 HOURS
Refills: 0 | Status: DISCONTINUED | OUTPATIENT
Start: 2024-10-31 | End: 2024-11-03

## 2024-10-31 RX ORDER — FUROSEMIDE 40 MG
40 TABLET ORAL ONCE
Refills: 0 | Status: COMPLETED | OUTPATIENT
Start: 2024-10-31 | End: 2024-10-31

## 2024-10-31 RX ORDER — FUROSEMIDE 40 MG
0 TABLET ORAL
Qty: 0 | Refills: 0 | DISCHARGE

## 2024-10-31 RX ORDER — CLONAZEPAM 1 MG
0 TABLET ORAL
Qty: 0 | Refills: 0 | DISCHARGE

## 2024-10-31 RX ADMIN — Medication 100 MILLIEQUIVALENT(S): at 10:34

## 2024-10-31 RX ADMIN — Medication 40 MILLIGRAM(S): at 10:00

## 2024-10-31 RX ADMIN — Medication 50 MILLIGRAM(S): at 03:25

## 2024-10-31 RX ADMIN — Medication 40 MILLIGRAM(S): at 17:42

## 2024-10-31 RX ADMIN — HYDRALAZINE HYDROCHLORIDE 10 MILLIGRAM(S): 50 TABLET, FILM COATED ORAL at 09:59

## 2024-10-31 RX ADMIN — NYSTATIN 1 APPLICATION(S): 100000 POWDER TOPICAL at 17:42

## 2024-10-31 RX ADMIN — Medication 2 MILLIGRAM(S): at 09:59

## 2024-10-31 RX ADMIN — Medication 2 MILLIGRAM(S): at 06:54

## 2024-10-31 RX ADMIN — CLONIDINE HYDROCHLORIDE 0.1 MILLIGRAM(S): 0.2 TABLET ORAL at 06:56

## 2024-10-31 RX ADMIN — Medication 10 MILLIGRAM(S): at 06:55

## 2024-10-31 RX ADMIN — DEXMEDETOMIDINE HYDROCHLORIDE 6.12 MICROGRAM(S)/KG/HR: 400 INJECTION, SOLUTION INTRAVENOUS at 17:42

## 2024-10-31 RX ADMIN — Medication 100 MILLIEQUIVALENT(S): at 22:49

## 2024-10-31 RX ADMIN — Medication 100 MILLIEQUIVALENT(S): at 21:44

## 2024-10-31 RX ADMIN — Medication 100 MILLIEQUIVALENT(S): at 11:05

## 2024-10-31 RX ADMIN — Medication 80 MILLIGRAM(S): at 19:42

## 2024-10-31 RX ADMIN — CHLORHEXIDINE GLUCONATE 1 APPLICATION(S): 40 SOLUTION TOPICAL at 10:35

## 2024-10-31 RX ADMIN — DIAZEPAM 20 MILLIGRAM(S): 10 FILM BUCCAL at 19:51

## 2024-10-31 RX ADMIN — Medication 100 MILLIEQUIVALENT(S): at 20:42

## 2024-10-31 RX ADMIN — Medication 25 GRAM(S): at 10:34

## 2024-10-31 RX ADMIN — Medication 40 MILLIGRAM(S): at 06:10

## 2024-10-31 RX ADMIN — Medication 50 MILLIGRAM(S): at 06:18

## 2024-10-31 RX ADMIN — DEXMEDETOMIDINE HYDROCHLORIDE 6.12 MICROGRAM(S)/KG/HR: 400 INJECTION, SOLUTION INTRAVENOUS at 23:59

## 2024-10-31 RX ADMIN — DIAZEPAM 20 MILLIGRAM(S): 10 FILM BUCCAL at 09:41

## 2024-10-31 RX ADMIN — DEXMEDETOMIDINE HYDROCHLORIDE 6.12 MICROGRAM(S)/KG/HR: 400 INJECTION, SOLUTION INTRAVENOUS at 10:35

## 2024-10-31 RX ADMIN — Medication 100 MILLIEQUIVALENT(S): at 11:58

## 2024-10-31 NOTE — PATIENT PROFILE ADULT - FALL HARM RISK - FACTORS
CIWA protocol initiation less than 96 hours/Frequent toileting needed/Impaired gait/Impaired vision/IV and/or equipment tethered to patient/Medication side effects/Other medical problems/Poor balance/Weakness

## 2024-10-31 NOTE — H&P ADULT - NSHPPHYSICALEXAM_GEN_ALL_CORE
GENERAL: pt examined bedside, intermittently restless   HEENT: NC/AT, moist oral mucosa, clear conjunctiva, sclera nonicteric  RESPIRATORY: poor inspiratory effort, rapid shallow breathing, b/l rales  CARDIOVASCULAR: RRR, normal S1 and S2  ABDOMEN: soft, obese abd, NT/ND, normoactive bowel sounds  MSK: No joint deformities, edema, erythema  EXTREMITIES: No cynaosis, no clubbing, +LE edema  PSYCH: unable to assess   NEUROLOGY: lethargic but rousable to noxious stim, moving all extremities spontaneously   SKIN: No rashes or no palpable lesions

## 2024-10-31 NOTE — PATIENT PROFILE ADULT - FALL HARM RISK - HARM RISK INTERVENTIONS
Assistance with ambulation/Assistance OOB with selected safe patient handling equipment/Communicate Risk of Fall with Harm to all staff/Discuss with provider need for PT consult/Monitor for mental status changes/Monitor gait and stability/Move patient closer to nurses' station/Reinforce activity limits and safety measures with patient and family/Reorient to person, place and time as needed/Review medications for side effects contributing to fall risk/Sit up slowly, dangle for a short time, stand at bedside before walking/Tailored Fall Risk Interventions/Toileting schedule using arm’s reach rule for commode and bathroom/Use of alarms - bed, chair and/or voice tab/Visual Cue: Yellow wristband and red socks/Bed in lowest position, wheels locked, appropriate side rails in place/Call bell, personal items and telephone in reach/Instruct patient to call for assistance before getting out of bed or chair/Non-slip footwear when patient is out of bed/[place] to call system/Physically safe environment - no spills, clutter or unnecessary equipment/Purposeful Proactive Rounding/Room/bathroom lighting operational, light cord in reach

## 2024-10-31 NOTE — CONSULT NOTE ADULT - ATTENDING COMMENTS
63 yo male with hx of ETOH abuse, chronic HFpEF, HTN, presented initially with dyspnea to the hospital. Patient developed ETOH withdrawal, hypertensive emergency, transferred to the MICU.  Plan is for benzos, precedex for ETOH withdrawal.   Diuresis for heart failure, awaiting TTE, GDMT once able to tolerate.  BP control, may need infusion of antihypertensive.

## 2024-10-31 NOTE — ED ADULT NURSE REASSESSMENT NOTE - NS ED NURSE REASSESS COMMENT FT1
pt wanted to AMA, signed form with resident, then decided to stay for admission into hospital. pt breathing even and unlabored on NC 2LPM. no complaints of pain or discomfort at this time.

## 2024-10-31 NOTE — CONSULT NOTE ADULT - SUBJECTIVE AND OBJECTIVE BOX
HPI:  65 y/o M PMH of etoh abuse, HFpEF, HTN presented c/o SOB for the past few months. Per documentation, medicine team unable to obtain information from pt as he is lethargic.  Pt reported to ED he has had difficulty laying flat, requiring more pillows to sleep and that he is noncompliant w/ home medications.  He also reported drinking 4-6 large beers every day and last drink was last night. Admitted to medicine for CHF exacerbation and EtOH withdrawal, On evaluation by medicine team this morning, pt appears to be withdrawing. In total up to this point pt has received 100 librium, 4 ativan. Noted to be hypertensive SBP 200s, given clonidine 0.1 and 10 IV labetalol, without improvement. MICU consulted for EtOH withdrawal, agitation, hypertensive em    PAST MEDICAL & SURGICAL HISTORY:  HTN (hypertension)      CHF (congestive heart failure)      Atrial fibrillation      Depression, unspecified depression type      Pulmonary hypertension      Hyperlipidemia      Pericardial effusion        Allergies    No Known Allergies    Intolerances      FAMILY HISTORY:      Review of Systems:  CONSTITUTIONAL: No fever, chills, or fatigue  EYES: No eye pain, visual disturbances, or discharge  ENMT:  No difficulty hearing, tinnitus, vertigo; No sinus or throat pain  NECK: No pain or stiffness  RESPIRATORY: No cough, wheezing, chills or hemoptysis; No shortness of breath  CARDIOVASCULAR: No chest pain, palpitations, dizziness, or leg swelling  GASTROINTESTINAL: No abdominal or epigastric pain. No nausea, vomiting, or hematemesis; No diarrhea or constipation. No melena or hematochezia.  GENITOURINARY: No dysuria, frequency, hematuria, or incontinence  NEUROLOGICAL: No headaches, memory loss, loss of strength, numbness, or tremors  SKIN: No itching, burning, rashes, or lesions   MUSCULOSKELETAL: No joint pain or swelling; No muscle, back, or extremity pain  PSYCHIATRIC: No depression, anxiety, mood swings, or difficulty sleeping      Medications:    cloNIDine 0.1 milliGRAM(s) Oral every 4 hours  furosemide   Injectable 40 milliGRAM(s) IV Push every 12 hours  hydrALAZINE Injectable 10 milliGRAM(s) IV Push every 4 hours PRN  niCARdipine Infusion 5 mG/Hr IV Continuous <Continuous>      acetaminophen     Tablet .. 650 milliGRAM(s) Oral every 6 hours PRN  dexMEDEtomidine Infusion 0.3 MICROgram(s)/kG/Hr IV Continuous <Continuous>  diazepam  Injectable 20 milliGRAM(s) IV Push every 4 hours PRN  melatonin 3 milliGRAM(s) Oral at bedtime PRN  ondansetron Injectable 4 milliGRAM(s) IV Push every 8 hours PRN      enoxaparin Injectable 80 milliGRAM(s) SubCutaneous every 12 hours    aluminum hydroxide/magnesium hydroxide/simethicone Suspension 30 milliLiter(s) Oral every 4 hours PRN        folic acid 1 milliGRAM(s) Oral daily  multivitamin 1 Tablet(s) Oral daily  thiamine 100 milliGRAM(s) Oral daily      chlorhexidine 2% Cloths 1 Application(s) Topical <User Schedule>  nystatin Powder 1 Application(s) Topical two times a day            ICU Vital Signs Last 24 Hrs  T(C): 36.7 (31 Oct 2024 09:00), Max: 37 (31 Oct 2024 06:05)  T(F): 98.1 (31 Oct 2024 09:00), Max: 98.6 (31 Oct 2024 06:05)  HR: 58 (31 Oct 2024 14:15) (56 - 93)  BP: 106/68 (31 Oct 2024 14:15) (93/65 - 236/130)  BP(mean): 80 (31 Oct 2024 14:15) (76 - 156)  ABP: --  ABP(mean): --  RR: 15 (31 Oct 2024 14:15) (12 - 29)  SpO2: 96% (31 Oct 2024 14:15) (94% - 100%)    O2 Parameters below as of 31 Oct 2024 12:00  Patient On (Oxygen Delivery Method): nasal cannula  O2 Flow (L/min): 3        Vital Signs Last 24 Hrs  T(C): 36.7 (31 Oct 2024 09:00), Max: 37 (31 Oct 2024 06:05)  T(F): 98.1 (31 Oct 2024 09:00), Max: 98.6 (31 Oct 2024 06:05)  HR: 58 (31 Oct 2024 14:15) (56 - 93)  BP: 106/68 (31 Oct 2024 14:15) (93/65 - 236/130)  BP(mean): 80 (31 Oct 2024 14:15) (76 - 156)  RR: 15 (31 Oct 2024 14:15) (12 - 29)  SpO2: 96% (31 Oct 2024 14:15) (94% - 100%)    Parameters below as of 31 Oct 2024 12:00  Patient On (Oxygen Delivery Method): nasal cannula  O2 Flow (L/min): 3      Physical Examination:    General: No acute distress.  Alert, oriented, interactive, nonfocal    HEENT: Pupils equal, reactive to light.  Symmetric.    PULM: Clear to auscultation bilaterally, no significant sputum production    CVS: Regular rate and rhythm, no murmurs, rubs, or gallops    ABD: Soft, nondistended, nontender, normoactive bowel sounds, no masses    EXT: No edema, nontender    SKIN: Warm and well perfused, no rashes noted.    ABG - ( 31 Oct 2024 10:05 )  pH, Arterial: 7.500 pH, Blood: x     /  pCO2: 40    /  pO2: 107   / HCO3: 31    / Base Excess: 8.0   /  SaO2: 99.9                I&O's Detail    31 Oct 2024 07:01  -  31 Oct 2024 16:13  --------------------------------------------------------  IN:    Dexmedetomidine: 61.2 mL    IV PiggyBack: 50 mL    IV PiggyBack: 300 mL  Total IN: 411.2 mL    OUT:    NiCARdipine: 0 mL    Voided (mL): 370 mL  Total OUT: 370 mL    Total NET: 41.2 mL            LABS:                        14.3   8.78  )-----------( 236      ( 31 Oct 2024 08:34 )             42.9     10-31    143  |  102  |  22.1[H]  ----------------------------<  106[H]  3.5   |  27.0  |  1.31[H]    Ca    9.2      31 Oct 2024 08:34  Phos  3.8     10-31  Mg     1.8     10-31    TPro  6.9  /  Alb  3.9  /  TBili  0.6  /  DBili  x   /  AST  13  /  ALT  9   /  AlkPhos  57  10-31          CAPILLARY BLOOD GLUCOSE        PT/INR - ( 30 Oct 2024 22:20 )   PT: 11.6 sec;   INR: 1.03 ratio         PTT - ( 30 Oct 2024 22:20 )  PTT:29.2 sec  Urinalysis Basic - ( 31 Oct 2024 08:34 )    Color: x / Appearance: x / SG: x / pH: x  Gluc: 106 mg/dL / Ketone: x  / Bili: x / Urobili: x   Blood: x / Protein: x / Nitrite: x   Leuk Esterase: x / RBC: x / WBC x   Sq Epi: x / Non Sq Epi: x / Bacteria: x      CULTURES:        RADIOLOGY: ***    CRITICAL CARE TIME SPENT: ***   HPI:  65 y/o M PMH of etoh abuse (hx of hospitalizations for withdrawal, no hx of withdraw seizures), HFpEF, HTN presented c/o SOB for the past few months. Per documentation, medicine team unable to obtain information from pt as he is lethargic.  Pt reported to ED he has had difficulty laying flat, requiring more pillows to sleep and that he is noncompliant w/ his home medications, including lasix, last dose was at least a month ago. Patient has been drinking 4-6 large beer every day for the past 4 months. Last drink was 1 day ago. In ED patient endorsed feeling like he was going through withdrawal, endorsing feeling anxious, SOB and diaphoresis.  In ED, patient found to have  BNP >3000, trop 65, CXR significant for Bilateral perihilar opacities likely 2/2 pulm edema. Patient was admitted to medicine for CHF exacerbation and EtOH withdrawal, On evaluation by medicine team this morning, pt appears to be withdrawing. In total up to this point pt has received 100 librium, 4 ativan. Noted to be hypertensive SBP 200s, given clonidine 0.1 and 10 IV labetalol, without improvement. MICU consulted for EtOH withdrawal, agitation, hypertensive emergency.    Patient was seen at bedside, Gifty solomonian. SBP in the 200s. Pt endorsed SOB and abdominal pain, multiple attempts at getting up to use bathroom, despite explaining to patient he had catheter. No tremors were noted on gross exam.      PAST MEDICAL & SURGICAL HISTORY:  HTN (hypertension)  CHF (congestive heart failure)  Atrial fibrillation  Depression, unspecified depression type  Pulmonary hypertension  Hyperlipidemia  Pericardial effusion  Allergies    No Known Allergies    Intolerances      FAMILY HISTORY:      Review of Systems:  CONSTITUTIONAL: No fever, chills, or fatigue  EYES: No eye pain, visual disturbances, or discharge  ENMT:  No difficulty hearing, tinnitus, vertigo; No sinus or throat pain  NECK: No pain or stiffness  RESPIRATORY: No cough, wheezing, chills or hemoptysis; No shortness of breath  CARDIOVASCULAR: No chest pain, palpitations, dizziness, or leg swelling  GASTROINTESTINAL: No abdominal or epigastric pain. No nausea, vomiting, or hematemesis; No diarrhea or constipation. No melena or hematochezia.  GENITOURINARY: No dysuria, frequency, hematuria, or incontinence  NEUROLOGICAL: No headaches, memory loss, loss of strength, numbness, or tremors  SKIN: No itching, burning, rashes, or lesions   MUSCULOSKELETAL: No joint pain or swelling; No muscle, back, or extremity pain  PSYCHIATRIC: No depression, anxiety, mood swings, or difficulty sleeping      Medications:    cloNIDine 0.1 milliGRAM(s) Oral every 4 hours  furosemide   Injectable 40 milliGRAM(s) IV Push every 12 hours  hydrALAZINE Injectable 10 milliGRAM(s) IV Push every 4 hours PRN  niCARdipine Infusion 5 mG/Hr IV Continuous <Continuous>  acetaminophen     Tablet .. 650 milliGRAM(s) Oral every 6 hours PRN  dexMEDEtomidine Infusion 0.3 MICROgram(s)/kG/Hr IV Continuous <Continuous>  diazepam  Injectable 20 milliGRAM(s) IV Push every 4 hours PRN  melatonin 3 milliGRAM(s) Oral at bedtime PRN  ondansetron Injectable 4 milliGRAM(s) IV Push every 8 hours PRN  enoxaparin Injectable 80 milliGRAM(s) SubCutaneous every 12 hours  aluminum hydroxide/magnesium hydroxide/simethicone Suspension 30 milliLiter(s) Oral every 4 hours PRN  folic acid 1 milliGRAM(s) Oral daily  multivitamin 1 Tablet(s) Oral daily  thiamine 100 milliGRAM(s) Oral daily  chlorhexidine 2% Cloths 1 Application(s) Topical <User Schedule>  nystatin Powder 1 Application(s) Topical two times a day    ICU Vital Signs Last 24 Hrs  T(C): 36.7 (31 Oct 2024 09:00), Max: 37 (31 Oct 2024 06:05)  T(F): 98.1 (31 Oct 2024 09:00), Max: 98.6 (31 Oct 2024 06:05)  HR: 58 (31 Oct 2024 14:15) (56 - 93)  BP: 106/68 (31 Oct 2024 14:15) (93/65 - 236/130)  BP(mean): 80 (31 Oct 2024 14:15) (76 - 156)  ABP: --  ABP(mean): --  RR: 15 (31 Oct 2024 14:15) (12 - 29)  SpO2: 96% (31 Oct 2024 14:15) (94% - 100%)    O2 Parameters below as of 31 Oct 2024 12:00  Patient On (Oxygen Delivery Method): nasal cannula  O2 Flow (L/min): 3        Vital Signs Last 24 Hrs  T(C): 36.7 (31 Oct 2024 09:00), Max: 37 (31 Oct 2024 06:05)  T(F): 98.1 (31 Oct 2024 09:00), Max: 98.6 (31 Oct 2024 06:05)  HR: 58 (31 Oct 2024 14:15) (56 - 93)  BP: 106/68 (31 Oct 2024 14:15) (93/65 - 236/130)  BP(mean): 80 (31 Oct 2024 14:15) (76 - 156)  RR: 15 (31 Oct 2024 14:15) (12 - 29)  SpO2: 96% (31 Oct 2024 14:15) (94% - 100%)    Parameters below as of 31 Oct 2024 12:00  Patient On (Oxygen Delivery Method): nasal cannula  O2 Flow (L/min): 3      Physical Examination:    General: No acute distress.  AAAOX1    HEENT: Pupils equal, reactive to light.  Symmetric.    PULM: Clear to auscultation bilaterally, no significant sputum production    CVS: Regular rate and rhythm, no murmurs, rubs, or gallops    ABD: Soft, nondistended, nontender, normoactive bowel sounds, no masses    EXT: No edema in bilateral lower extremities, nontender    SKIN: Warm and well perfused, no rashes noted.    ABG - ( 31 Oct 2024 10:05 )  pH, Arterial: 7.500 pH, Blood: x     /  pCO2: 40    /  pO2: 107   / HCO3: 31    / Base Excess: 8.0   /  SaO2: 99.9                I&O's Detail    31 Oct 2024 07:01  -  31 Oct 2024 16:13  --------------------------------------------------------  IN:    Dexmedetomidine: 61.2 mL    IV PiggyBack: 50 mL    IV PiggyBack: 300 mL  Total IN: 411.2 mL    OUT:    NiCARdipine: 0 mL    Voided (mL): 370 mL  Total OUT: 370 mL    Total NET: 41.2 mL            LABS:                        14.3   8.78  )-----------( 236      ( 31 Oct 2024 08:34 )             42.9     10-31    143  |  102  |  22.1[H]  ----------------------------<  106[H]  3.5   |  27.0  |  1.31[H]    Ca    9.2      31 Oct 2024 08:34  Phos  3.8     10-31  Mg     1.8     10-31    TPro  6.9  /  Alb  3.9  /  TBili  0.6  /  DBili  x   /  AST  13  /  ALT  9   /  AlkPhos  57  10-31          CAPILLARY BLOOD GLUCOSE        PT/INR - ( 30 Oct 2024 22:20 )   PT: 11.6 sec;   INR: 1.03 ratio         PTT - ( 30 Oct 2024 22:20 )  PTT:29.2 sec  Urinalysis Basic - ( 31 Oct 2024 08:34 )    Color: x / Appearance: x / SG: x / pH: x  Gluc: 106 mg/dL / Ketone: x  / Bili: x / Urobili: x   Blood: x / Protein: x / Nitrite: x   Leuk Esterase: x / RBC: x / WBC x   Sq Epi: x / Non Sq Epi: x / Bacteria: x        RADIOLOGY:   10/30 CXR - perihilar infiltrates possible multifocal pneumonia or pulmonary edema

## 2024-10-31 NOTE — H&P ADULT - HISTORY OF PRESENT ILLNESS
65 y/o M w/ PMH of etoh abuse, HFpEF, HTN presented c/o SOB for the past few months.  At this time unable to obtain information from pt as he is lethargic.  Pt reported to ED he has had difficulty laying flat, requiring more pillows to sleep and that he is noncompliant w/ home medications.  He also reported drinking 4-6 large beers every day and last drink was last night.

## 2024-10-31 NOTE — ED ADULT NURSE REASSESSMENT NOTE - NS ED NURSE REASSESS COMMENT FT1
report received. pt breathing even and unlabored on room air. NAD. pt updated on POC. pt noncompliant with tele/ monitoring, monitor techs made aware. pt breathing even and unlabored on room air. CIWA monitoring maintained

## 2024-10-31 NOTE — H&P ADULT - ASSESSMENT
65 y/o M w/ PMH of etoh abuse, HFpEF, HTN presented c/o SOB for the past few months.  At this time unable to obtain information from pt as he is lethargic but reported to ED he has been having sob for the past few months w/ associated orthopnea.  Pt became very restless in ED w/ BP rising to >200/>120, hypoxia to the 80's ORA and sinus tachy and tachypnea.  Initial w/u significant trop 65-->60, BNP>3K and CXR w/ pulm vascular congestion/pulm edema and mild effusions.  Will upgrade to SDU and consult MICU.       HTN emergency 2/2 ETOH withdrawal   - BP currently 225/141 and trop 65 but repeat down trending   - BNP >3K and pulm vascular congestion on CXR   - Suspect HTN emergency precipitated by etoh withdrawal   - s/p labetalol and lasix   - c/w IV lasix bid for diuresis given decompensated HF    - Upgrade to SDU and will consult MICU       ETOH withdrawal    - Started on CIWA protocol   - BAL <10 and stat Utox ordered   - s/p IV thiamin in ED and will c/w po thiamine    - s/p librium and ativan in ED w/ minimal improvement   - Start on IV valium standing and prn  - Start on short course of clonidine   - Stat EKG and Mag level ordered   - Maintain K>4 and Mg>2  - Low threshold to consult MICU       Acute hypoxic respiratory failure 2/2 acute decompensated HFpEF  - CHF likely 2/2 chronic etoh use and exacerbation precipitated by HTN emergency   - Clinically volume overloaded and requiring 2L supplemental O2 to maintain O2   - BNP 3840 and CXR w/ significant pulm edema/pulm vascular congestion and mild effusions appreciated; official report pending  - Last known TTE from 2022 shows LVEF 65-70%  - Stat repeat TTE ordered   - Start on IV lasix for aggressive diuresis  - Wean off O2 as tolerated    - Monitor daily weights and strict I/O's   - Monitor on telemetry/      Type II MI likely demand vs poor renal clearance   - Suspect more likely demand given HTN emergency and CHF exacerbation   - Repeat trop downtrending   - Stat EKG ordered to assess for ischemic changes  - Stat TTE ordered   - Check lipid panel and A1c  - Monitor on telemetry       CARLI vs CKD  - Last known cr from 2022 was 1.20 and currently 1.38 which could be progression of CKD vs CARLI  - Would order UA, bladder scan and monitor I/O's  - Anticipate degree of azotemia w/ ongoing aggressive IV diuresis for CHF  - Avoid nephrotoxic meds or renally dose if needed       pAF   - Resume home Eliquis  - c/w metoprolol for rate control but w/ caution in light of acute decompensated HF      HTN  - c/w amlodipine but if TTE shows rEF would d/c  - On IV lasix but transition back to home po lasix once euvolemic   - c/w hydralazine and ACEI      Depression / Anxiety   - Resume clonazepam 0.5mg BID on discharge as currently on valium taper   - c/w sertraline         VTE ppx: resume home eliquis       Dispo: Acute w/ low threshold for MICU consult.

## 2024-10-31 NOTE — H&P ADULT - NSHPLABSRESULTS_GEN_ALL_CORE
13.4   9.63  )-----------( 248      ( 30 Oct 2024 22:20 )             40.5         10-30    143  |  103  |  27.2[H]  ----------------------------<  100[H]  3.7   |  26.0  |  1.38[H]    Ca    9.2      30 Oct 2024 22:20  Phos  3.4     10-31  Mg     1.8     10-31    TPro  6.6  /  Alb  3.8  /  TBili  0.3[L]  /  DBili  x   /  AST  15  /  ALT  9   /  AlkPhos  55  10-30

## 2024-11-01 LAB
ALBUMIN SERPL ELPH-MCNC: 3.4 G/DL — SIGNIFICANT CHANGE UP (ref 3.3–5.2)
ALBUMIN SERPL ELPH-MCNC: 3.5 G/DL — SIGNIFICANT CHANGE UP (ref 3.3–5.2)
ALBUMIN SERPL ELPH-MCNC: 3.7 G/DL — SIGNIFICANT CHANGE UP (ref 3.3–5.2)
ALP SERPL-CCNC: 55 U/L — SIGNIFICANT CHANGE UP (ref 40–120)
ALP SERPL-CCNC: 56 U/L — SIGNIFICANT CHANGE UP (ref 40–120)
ALP SERPL-CCNC: 58 U/L — SIGNIFICANT CHANGE UP (ref 40–120)
ALT FLD-CCNC: 6 U/L — SIGNIFICANT CHANGE UP
ALT FLD-CCNC: 7 U/L — SIGNIFICANT CHANGE UP
ALT FLD-CCNC: 7 U/L — SIGNIFICANT CHANGE UP
AMMONIA BLD-MCNC: 24 UMOL/L — SIGNIFICANT CHANGE UP (ref 11–55)
ANION GAP SERPL CALC-SCNC: 12 MMOL/L — SIGNIFICANT CHANGE UP (ref 5–17)
ANION GAP SERPL CALC-SCNC: 13 MMOL/L — SIGNIFICANT CHANGE UP (ref 5–17)
ANION GAP SERPL CALC-SCNC: 14 MMOL/L — SIGNIFICANT CHANGE UP (ref 5–17)
APTT BLD: 33.4 SEC — SIGNIFICANT CHANGE UP (ref 24.5–35.6)
APTT BLD: 57.2 SEC — HIGH (ref 24.5–35.6)
AST SERPL-CCNC: 10 U/L — SIGNIFICANT CHANGE UP
AST SERPL-CCNC: 11 U/L — SIGNIFICANT CHANGE UP
AST SERPL-CCNC: 9 U/L — SIGNIFICANT CHANGE UP
BASOPHILS # BLD AUTO: 0.03 K/UL — SIGNIFICANT CHANGE UP (ref 0–0.2)
BASOPHILS # BLD AUTO: 0.04 K/UL — SIGNIFICANT CHANGE UP (ref 0–0.2)
BASOPHILS NFR BLD AUTO: 0.4 % — SIGNIFICANT CHANGE UP (ref 0–2)
BASOPHILS NFR BLD AUTO: 0.5 % — SIGNIFICANT CHANGE UP (ref 0–2)
BILIRUB SERPL-MCNC: 0.5 MG/DL — SIGNIFICANT CHANGE UP (ref 0.4–2)
BILIRUB SERPL-MCNC: 0.5 MG/DL — SIGNIFICANT CHANGE UP (ref 0.4–2)
BILIRUB SERPL-MCNC: 0.6 MG/DL — SIGNIFICANT CHANGE UP (ref 0.4–2)
BUN SERPL-MCNC: 25.6 MG/DL — HIGH (ref 8–20)
BUN SERPL-MCNC: 28 MG/DL — HIGH (ref 8–20)
BUN SERPL-MCNC: 28.3 MG/DL — HIGH (ref 8–20)
CALCIUM SERPL-MCNC: 8.7 MG/DL — SIGNIFICANT CHANGE UP (ref 8.4–10.5)
CALCIUM SERPL-MCNC: 9.1 MG/DL — SIGNIFICANT CHANGE UP (ref 8.4–10.5)
CALCIUM SERPL-MCNC: 9.1 MG/DL — SIGNIFICANT CHANGE UP (ref 8.4–10.5)
CHLORIDE SERPL-SCNC: 101 MMOL/L — SIGNIFICANT CHANGE UP (ref 96–108)
CHLORIDE SERPL-SCNC: 102 MMOL/L — SIGNIFICANT CHANGE UP (ref 96–108)
CHLORIDE SERPL-SCNC: 102 MMOL/L — SIGNIFICANT CHANGE UP (ref 96–108)
CK SERPL-CCNC: 71 U/L — SIGNIFICANT CHANGE UP (ref 30–200)
CO2 SERPL-SCNC: 25 MMOL/L — SIGNIFICANT CHANGE UP (ref 22–29)
CO2 SERPL-SCNC: 28 MMOL/L — SIGNIFICANT CHANGE UP (ref 22–29)
CO2 SERPL-SCNC: 29 MMOL/L — SIGNIFICANT CHANGE UP (ref 22–29)
CREAT SERPL-MCNC: 1.62 MG/DL — HIGH (ref 0.5–1.3)
CREAT SERPL-MCNC: 1.7 MG/DL — HIGH (ref 0.5–1.3)
CREAT SERPL-MCNC: 1.76 MG/DL — HIGH (ref 0.5–1.3)
EGFR: 42 ML/MIN/1.73M2 — LOW
EGFR: 44 ML/MIN/1.73M2 — LOW
EGFR: 47 ML/MIN/1.73M2 — LOW
EOSINOPHIL # BLD AUTO: 0.2 K/UL — SIGNIFICANT CHANGE UP (ref 0–0.5)
EOSINOPHIL # BLD AUTO: 0.21 K/UL — SIGNIFICANT CHANGE UP (ref 0–0.5)
EOSINOPHIL NFR BLD AUTO: 2.5 % — SIGNIFICANT CHANGE UP (ref 0–6)
EOSINOPHIL NFR BLD AUTO: 2.6 % — SIGNIFICANT CHANGE UP (ref 0–6)
GLUCOSE SERPL-MCNC: 101 MG/DL — HIGH (ref 70–99)
GLUCOSE SERPL-MCNC: 108 MG/DL — HIGH (ref 70–99)
GLUCOSE SERPL-MCNC: 123 MG/DL — HIGH (ref 70–99)
HCT VFR BLD CALC: 41.1 % — SIGNIFICANT CHANGE UP (ref 39–50)
HCT VFR BLD CALC: 43.1 % — SIGNIFICANT CHANGE UP (ref 39–50)
HCT VFR BLD CALC: 44 % — SIGNIFICANT CHANGE UP (ref 39–50)
HGB BLD-MCNC: 13.1 G/DL — SIGNIFICANT CHANGE UP (ref 13–17)
HGB BLD-MCNC: 13.9 G/DL — SIGNIFICANT CHANGE UP (ref 13–17)
HGB BLD-MCNC: 14.2 G/DL — SIGNIFICANT CHANGE UP (ref 13–17)
IMM GRANULOCYTES NFR BLD AUTO: 0.1 % — SIGNIFICANT CHANGE UP (ref 0–0.9)
IMM GRANULOCYTES NFR BLD AUTO: 0.2 % — SIGNIFICANT CHANGE UP (ref 0–0.9)
INR BLD: 1.03 RATIO — SIGNIFICANT CHANGE UP (ref 0.85–1.16)
LYMPHOCYTES # BLD AUTO: 0.61 K/UL — LOW (ref 1–3.3)
LYMPHOCYTES # BLD AUTO: 0.77 K/UL — LOW (ref 1–3.3)
LYMPHOCYTES # BLD AUTO: 7.5 % — LOW (ref 13–44)
LYMPHOCYTES # BLD AUTO: 9.4 % — LOW (ref 13–44)
MAGNESIUM SERPL-MCNC: 2.2 MG/DL — SIGNIFICANT CHANGE UP (ref 1.8–2.6)
MAGNESIUM SERPL-MCNC: 2.3 MG/DL — SIGNIFICANT CHANGE UP (ref 1.6–2.6)
MCHC RBC-ENTMCNC: 27.3 PG — SIGNIFICANT CHANGE UP (ref 27–34)
MCHC RBC-ENTMCNC: 27.6 PG — SIGNIFICANT CHANGE UP (ref 27–34)
MCHC RBC-ENTMCNC: 27.7 PG — SIGNIFICANT CHANGE UP (ref 27–34)
MCHC RBC-ENTMCNC: 31.9 G/DL — LOW (ref 32–36)
MCHC RBC-ENTMCNC: 32.3 G/DL — SIGNIFICANT CHANGE UP (ref 32–36)
MCHC RBC-ENTMCNC: 32.3 G/DL — SIGNIFICANT CHANGE UP (ref 32–36)
MCV RBC AUTO: 85.6 FL — SIGNIFICANT CHANGE UP (ref 80–100)
MCV RBC AUTO: 85.6 FL — SIGNIFICANT CHANGE UP (ref 80–100)
MCV RBC AUTO: 85.9 FL — SIGNIFICANT CHANGE UP (ref 80–100)
MONOCYTES # BLD AUTO: 0.66 K/UL — SIGNIFICANT CHANGE UP (ref 0–0.9)
MONOCYTES # BLD AUTO: 0.72 K/UL — SIGNIFICANT CHANGE UP (ref 0–0.9)
MONOCYTES NFR BLD AUTO: 8 % — SIGNIFICANT CHANGE UP (ref 2–14)
MONOCYTES NFR BLD AUTO: 8.9 % — SIGNIFICANT CHANGE UP (ref 2–14)
MRSA PCR RESULT.: SIGNIFICANT CHANGE UP
NEUTROPHILS # BLD AUTO: 6.51 K/UL — SIGNIFICANT CHANGE UP (ref 1.8–7.4)
NEUTROPHILS # BLD AUTO: 6.52 K/UL — SIGNIFICANT CHANGE UP (ref 1.8–7.4)
NEUTROPHILS NFR BLD AUTO: 79.4 % — HIGH (ref 43–77)
NEUTROPHILS NFR BLD AUTO: 80.5 % — HIGH (ref 43–77)
NT-PROBNP SERPL-SCNC: 1536 PG/ML — HIGH (ref 0–300)
PHOSPHATE SERPL-MCNC: 4.2 MG/DL — SIGNIFICANT CHANGE UP (ref 2.4–4.7)
PHOSPHATE SERPL-MCNC: 4.6 MG/DL — SIGNIFICANT CHANGE UP (ref 2.4–4.7)
PLATELET # BLD AUTO: 221 K/UL — SIGNIFICANT CHANGE UP (ref 150–400)
PLATELET # BLD AUTO: 231 K/UL — SIGNIFICANT CHANGE UP (ref 150–400)
PLATELET # BLD AUTO: 239 K/UL — SIGNIFICANT CHANGE UP (ref 150–400)
POTASSIUM SERPL-MCNC: 3.7 MMOL/L — SIGNIFICANT CHANGE UP (ref 3.5–5.3)
POTASSIUM SERPL-MCNC: 3.7 MMOL/L — SIGNIFICANT CHANGE UP (ref 3.5–5.3)
POTASSIUM SERPL-MCNC: 4.2 MMOL/L — SIGNIFICANT CHANGE UP (ref 3.5–5.3)
POTASSIUM SERPL-SCNC: 3.7 MMOL/L — SIGNIFICANT CHANGE UP (ref 3.5–5.3)
POTASSIUM SERPL-SCNC: 3.7 MMOL/L — SIGNIFICANT CHANGE UP (ref 3.5–5.3)
POTASSIUM SERPL-SCNC: 4.2 MMOL/L — SIGNIFICANT CHANGE UP (ref 3.5–5.3)
PROT SERPL-MCNC: 6.3 G/DL — LOW (ref 6.6–8.7)
PROT SERPL-MCNC: 6.6 G/DL — SIGNIFICANT CHANGE UP (ref 6.6–8.7)
PROT SERPL-MCNC: 6.7 G/DL — SIGNIFICANT CHANGE UP (ref 6.6–8.7)
PROTHROM AB SERPL-ACNC: 12 SEC — SIGNIFICANT CHANGE UP (ref 9.9–13.4)
RBC # BLD: 4.8 M/UL — SIGNIFICANT CHANGE UP (ref 4.2–5.8)
RBC # BLD: 5.02 M/UL — SIGNIFICANT CHANGE UP (ref 4.2–5.8)
RBC # BLD: 5.14 M/UL — SIGNIFICANT CHANGE UP (ref 4.2–5.8)
RBC # FLD: 13.8 % — SIGNIFICANT CHANGE UP (ref 10.3–14.5)
RBC # FLD: 14 % — SIGNIFICANT CHANGE UP (ref 10.3–14.5)
RBC # FLD: 14.1 % — SIGNIFICANT CHANGE UP (ref 10.3–14.5)
S AUREUS DNA NOSE QL NAA+PROBE: DETECTED
SODIUM SERPL-SCNC: 141 MMOL/L — SIGNIFICANT CHANGE UP (ref 135–145)
SODIUM SERPL-SCNC: 142 MMOL/L — SIGNIFICANT CHANGE UP (ref 135–145)
SODIUM SERPL-SCNC: 143 MMOL/L — SIGNIFICANT CHANGE UP (ref 135–145)
TROPONIN T, HIGH SENSITIVITY RESULT: 66 NG/L — HIGH (ref 0–51)
WBC # BLD: 8.1 K/UL — SIGNIFICANT CHANGE UP (ref 3.8–10.5)
WBC # BLD: 8.2 K/UL — SIGNIFICANT CHANGE UP (ref 3.8–10.5)
WBC # BLD: 8.27 K/UL — SIGNIFICANT CHANGE UP (ref 3.8–10.5)
WBC # FLD AUTO: 8.1 K/UL — SIGNIFICANT CHANGE UP (ref 3.8–10.5)
WBC # FLD AUTO: 8.2 K/UL — SIGNIFICANT CHANGE UP (ref 3.8–10.5)
WBC # FLD AUTO: 8.27 K/UL — SIGNIFICANT CHANGE UP (ref 3.8–10.5)

## 2024-11-01 PROCEDURE — 99291 CRITICAL CARE FIRST HOUR: CPT

## 2024-11-01 RX ORDER — HEPARIN SODIUM 10000 [USP'U]/ML
5000 INJECTION INTRAVENOUS; SUBCUTANEOUS EVERY 12 HOURS
Refills: 0 | Status: DISCONTINUED | OUTPATIENT
Start: 2024-11-01 | End: 2024-11-01

## 2024-11-01 RX ORDER — AMLODIPINE BESYLATE 10 MG
10 TABLET ORAL DAILY
Refills: 0 | Status: DISCONTINUED | OUTPATIENT
Start: 2024-11-01 | End: 2024-11-06

## 2024-11-01 RX ORDER — FOLIC ACID 1 MG/1
1 TABLET ORAL DAILY
Refills: 0 | Status: DISCONTINUED | OUTPATIENT
Start: 2024-11-01 | End: 2024-11-02

## 2024-11-01 RX ORDER — SERTRALINE HYDROCHLORIDE 50 MG/1
25 TABLET, FILM COATED ORAL DAILY
Refills: 0 | Status: DISCONTINUED | OUTPATIENT
Start: 2024-11-01 | End: 2024-11-01

## 2024-11-01 RX ORDER — THIAMINE HCL 100 MG
500 TABLET ORAL EVERY 8 HOURS
Refills: 0 | Status: COMPLETED | OUTPATIENT
Start: 2024-11-01 | End: 2024-11-04

## 2024-11-01 RX ORDER — SERTRALINE HYDROCHLORIDE 50 MG/1
50 TABLET, FILM COATED ORAL DAILY
Refills: 0 | Status: DISCONTINUED | OUTPATIENT
Start: 2024-11-01 | End: 2024-11-06

## 2024-11-01 RX ORDER — QUETIAPINE FUMARATE 200 MG/1
25 TABLET ORAL ONCE
Refills: 0 | Status: COMPLETED | OUTPATIENT
Start: 2024-11-01 | End: 2024-11-01

## 2024-11-01 RX ORDER — DIAZEPAM 10 MG/1
20 FILM BUCCAL EVERY 6 HOURS
Refills: 0 | Status: DISCONTINUED | OUTPATIENT
Start: 2024-11-01 | End: 2024-11-02

## 2024-11-01 RX ORDER — NICOTINE POLACRILEX 4 MG/1
21 GUM, CHEWING ORAL DAILY
Refills: 0 | Status: DISCONTINUED | OUTPATIENT
Start: 2024-11-01 | End: 2024-11-06

## 2024-11-01 RX ORDER — QUETIAPINE FUMARATE 200 MG/1
25 TABLET ORAL AT BEDTIME
Refills: 0 | Status: DISCONTINUED | OUTPATIENT
Start: 2024-11-01 | End: 2024-11-06

## 2024-11-01 RX ORDER — HALOPERIDOL DECANOATE 50 MG/ML
5 INJECTION INTRAMUSCULAR EVERY 6 HOURS
Refills: 0 | Status: DISCONTINUED | OUTPATIENT
Start: 2024-11-01 | End: 2024-11-02

## 2024-11-01 RX ORDER — HYDRALAZINE HYDROCHLORIDE 50 MG/1
25 TABLET, FILM COATED ORAL THREE TIMES A DAY
Refills: 0 | Status: DISCONTINUED | OUTPATIENT
Start: 2024-11-01 | End: 2024-11-02

## 2024-11-01 RX ORDER — DIAZEPAM 10 MG/1
10 FILM BUCCAL EVERY 6 HOURS
Refills: 0 | Status: DISCONTINUED | OUTPATIENT
Start: 2024-11-01 | End: 2024-11-01

## 2024-11-01 RX ORDER — HEPARIN SODIUM 10000 [USP'U]/ML
INJECTION INTRAVENOUS; SUBCUTANEOUS
Qty: 25000 | Refills: 0 | Status: DISCONTINUED | OUTPATIENT
Start: 2024-11-01 | End: 2024-11-02

## 2024-11-01 RX ORDER — DIAZEPAM 10 MG/1
20 FILM BUCCAL EVERY 6 HOURS
Refills: 0 | Status: DISCONTINUED | OUTPATIENT
Start: 2024-11-01 | End: 2024-11-01

## 2024-11-01 RX ADMIN — NYSTATIN 1 APPLICATION(S): 100000 POWDER TOPICAL at 05:45

## 2024-11-01 RX ADMIN — DIAZEPAM 20 MILLIGRAM(S): 10 FILM BUCCAL at 10:50

## 2024-11-01 RX ADMIN — Medication 40 MILLIGRAM(S): at 05:45

## 2024-11-01 RX ADMIN — NYSTATIN 1 APPLICATION(S): 100000 POWDER TOPICAL at 17:49

## 2024-11-01 RX ADMIN — DIAZEPAM 10 MILLIGRAM(S): 10 FILM BUCCAL at 12:22

## 2024-11-01 RX ADMIN — CHLORHEXIDINE GLUCONATE 1 APPLICATION(S): 40 SOLUTION TOPICAL at 05:45

## 2024-11-01 RX ADMIN — Medication 105 MILLIGRAM(S): at 22:27

## 2024-11-01 RX ADMIN — DEXMEDETOMIDINE HYDROCHLORIDE 6.12 MICROGRAM(S)/KG/HR: 400 INJECTION, SOLUTION INTRAVENOUS at 23:06

## 2024-11-01 RX ADMIN — QUETIAPINE FUMARATE 25 MILLIGRAM(S): 200 TABLET ORAL at 22:27

## 2024-11-01 RX ADMIN — DIAZEPAM 20 MILLIGRAM(S): 10 FILM BUCCAL at 04:53

## 2024-11-01 RX ADMIN — HEPARIN SODIUM 1500 UNIT(S)/HR: 10000 INJECTION INTRAVENOUS; SUBCUTANEOUS at 16:11

## 2024-11-01 RX ADMIN — DIAZEPAM 10 MILLIGRAM(S): 10 FILM BUCCAL at 17:06

## 2024-11-01 RX ADMIN — DIAZEPAM 20 MILLIGRAM(S): 10 FILM BUCCAL at 18:39

## 2024-11-01 RX ADMIN — DEXMEDETOMIDINE HYDROCHLORIDE 6.12 MICROGRAM(S)/KG/HR: 400 INJECTION, SOLUTION INTRAVENOUS at 14:43

## 2024-11-01 RX ADMIN — Medication 40 MILLIGRAM(S): at 17:06

## 2024-11-01 RX ADMIN — DIAZEPAM 20 MILLIGRAM(S): 10 FILM BUCCAL at 23:07

## 2024-11-01 RX ADMIN — Medication 80 MILLIGRAM(S): at 08:02

## 2024-11-01 RX ADMIN — DEXMEDETOMIDINE HYDROCHLORIDE 6.12 MICROGRAM(S)/KG/HR: 400 INJECTION, SOLUTION INTRAVENOUS at 05:50

## 2024-11-01 RX ADMIN — DIAZEPAM 20 MILLIGRAM(S): 10 FILM BUCCAL at 14:43

## 2024-11-01 NOTE — PROGRESS NOTE ADULT - ASSESSMENT
ASSESSMENT  63 yo male with hx of ETOH abuse, chronic HFpEF, A-fib, HTN, presented initially with dyspnea to the hospital. Patient developed ETOH withdrawal, hypertensive emergency, transferred to the MICU.    PLAN:  # ETOH withdrawal  # HTN emergency   # Acute hypoxic respiratory failure 2/2 acute decompensated HFpEF  # A-fib   # CARLI Vs. CKD  #Depression/Anxiety       =====Neurologic=====  - c/w precedex, goal to wean pt off precedex today if tolerated   - c/w diazepam 10 mg IVP q6h,   - Diazepam 20mg IV push q4hrs, PRN  - c/w clonidine 0.1 mg PO q4, day 2/3  - C/w CIWA  - neuro checks q4h  - acteaminophen 650mg PO q6h prn for fever      =====Cardiovascular=====  - Maintain MAP>65  - Cardiac monitor  - c/w hydralazine   - c/w nicardipine infusion   - Goal of K>4, Phos >3, Mag >2. Replete as needed  - f/u TTE  - f/u EEG  - cardiology consulted, followed recs    =====Pulmonary=====  - Sating well on RA, continue to monitor on   - Aspiration precautions  - c/w lasix 40mg IBP q12h for pulm edema 2/2 CHF      =====GI=====  - Protonix for GI ppx  - c/w thiamine  - c/w folate  - c/w multivitamin  - f/u hepatic panel  - Dietician consulted, following recs        =====Renal/=====  - condom cath  - monitor I/Os  - Creatinine trending up  - Repeated labs at 2pm  - If creatine trending up, switch Lovenox to Heparin drip       =====Infectious Disease=====  - C/w nystatin powder , topical BID for groin  - trend CBC      DVT: Hold Lovenox 80 mg SC q12h, waiting for afternoon labs   Diet: NPO  Code Status: Full Code  Dispo: MICU    Case discussed with MICU Attending: Dr. Mejia

## 2024-11-01 NOTE — PROGRESS NOTE ADULT - SUBJECTIVE AND OBJECTIVE BOX
Patient is a 64y old  Male who presents with a chief complaint of ETOH withdrawal / HTN emergency (31 Oct 2024 09:30)    BRIEF HOSPITAL COURSE:   65 y/o M PMH of etoh abuse (hx of hospitalizations for withdrawal, no hx of withdraw seizures, HFpEF, HTN presented c/o SOB for the past few months. Per documentation, medicine team unable to obtain information from pt as he is lethargic.  Pt reported to ED he has had difficulty laying flat, requiring more pillows to sleep and that he is noncompliant w/ his home medications, including lasix, last dose was at least a month ago. Patient has been drinking 4-6 large beer every day for the past 4 months. Last drink was 1 day ago. In ED patient endorsed feeling like he was going through withdrawal, endorsing feeling anxious, SOB and diaphoresis.  In ED, patient found to have  BNP >3000, trop 65, CXR significant for Bilateral perihilar opacities likely 2/2 pulm edema. Patient was admitted to medicine for CHF exacerbation and EtOH withdrawal, On evaluation by medicine team, pt appears to be withdrawing. In total up to this point pt has received 100 librium, 4 ativan. Noted to be hypertensive SBP 200s, given clonidine 0.1 and 10 IV labetalol, without improvement. MICU consulted for EtOH withdrawal, agitation, hypertensive emergency.    Past 24hrs events   No acute event o/n. Pt sedated. sBP 178. No tremors were noted on gross exam.      PAST MEDICAL & SURGICAL HISTORY:  HTN (hypertension)  CHF (congestive heart failure)  Atrial fibrillation  Depression, unspecified depression type  Pulmonary hypertension  Hyperlipidemia  Pericardial effusion      Review of Systems (unable to obtain)         Medications:    cloNIDine 0.1 milliGRAM(s) Oral every 4 hours  furosemide   Injectable 40 milliGRAM(s) IV Push every 12 hours  hydrALAZINE Injectable 10 milliGRAM(s) IV Push every 4 hours PRN      acetaminophen     Tablet .. 650 milliGRAM(s) Oral every 6 hours PRN  dexMEDEtomidine Infusion 0.3 MICROgram(s)/kG/Hr IV Continuous <Continuous>  diazepam  Injectable 20 milliGRAM(s) IV Push every 4 hours PRN  diazepam  Injectable 10 milliGRAM(s) IV Push every 6 hours  melatonin 3 milliGRAM(s) Oral at bedtime PRN  ondansetron Injectable 4 milliGRAM(s) IV Push every 8 hours PRN        aluminum hydroxide/magnesium hydroxide/simethicone Suspension 30 milliLiter(s) Oral every 4 hours PRN        folic acid 1 milliGRAM(s) Oral daily  multivitamin 1 Tablet(s) Oral daily  thiamine 100 milliGRAM(s) Oral daily      chlorhexidine 2% Cloths 1 Application(s) Topical <User Schedule>  nystatin Powder 1 Application(s) Topical two times a day            ICU Vital Signs Last 24 Hrs  T(C): 36.5 (01 Nov 2024 12:06), Max: 36.6 (01 Nov 2024 08:08)  T(F): 97.7 (01 Nov 2024 12:06), Max: 97.8 (01 Nov 2024 08:08)  HR: 53 (01 Nov 2024 13:30) (49 - 62)  BP: 134/93 (01 Nov 2024 13:30) (101/75 - 159/88)  BP(mean): 106 (01 Nov 2024 13:30) (79 - 119)  ABP: --  ABP(mean): --  RR: 14 (01 Nov 2024 13:30) (10 - 27)  SpO2: 100% (01 Nov 2024 13:30) (92% - 100%)    O2 Parameters below as of 01 Nov 2024 12:00  Patient On (Oxygen Delivery Method): nasal cannula  O2 Flow (L/min): 4          ABG - ( 31 Oct 2024 10:05 )  pH, Arterial: 7.500 pH, Blood: x     /  pCO2: 40    /  pO2: 107   / HCO3: 31    / Base Excess: 8.0   /  SaO2: 99.9                I&O's Detail    31 Oct 2024 07:01  -  01 Nov 2024 07:00  --------------------------------------------------------  IN:    Dexmedetomidine: 312.4 mL    IV PiggyBack: 50 mL    IV PiggyBack: 600 mL  Total IN: 962.4 mL    OUT:    NiCARdipine: 0 mL    Voided (mL): 1595 mL  Total OUT: 1595 mL    Total NET: -632.6 mL      01 Nov 2024 07:01  -  01 Nov 2024 14:23  --------------------------------------------------------  IN:    Dexmedetomidine: 49 mL  Total IN: 49 mL    OUT:    Voided (mL): 400 mL  Total OUT: 400 mL    Total NET: -351 mL        Physical Examination:    General: No acute distress.  A&OX1    HEENT: Pupils equal, reactive to light.  Symmetric.    PULM: Clear to auscultation bilaterally, no significant sputum production    CVS: Regular rate, no murmurs, rubs, or gallops    ABD: Soft, slightly distended, nontender, normoactive bowel sounds, no masses    EXT: No edema in bilateral lower extremities, nontender    SKIN: Warm and well perfused, no rashes noted.    LABS:                        13.9   8.10  )-----------( 239      ( 01 Nov 2024 14:00 )             43.1     11-01    141  |  102  |  25.6[H]  ----------------------------<  123[H]  4.2   |  25.0  |  1.70[H]    Ca    8.7      01 Nov 2024 02:35  Phos  4.6     11-01  Mg     2.3     11-01    TPro  6.3[L]  /  Alb  3.5  /  TBili  0.5  /  DBili  x   /  AST  9   /  ALT  7   /  AlkPhos  56  11-01          CAPILLARY BLOOD GLUCOSE        PT/INR - ( 30 Oct 2024 22:20 )   PT: 11.6 sec;   INR: 1.03 ratio         PTT - ( 30 Oct 2024 22:20 )  PTT:29.2 sec  Urinalysis Basic - ( 01 Nov 2024 02:35 )    Color: x / Appearance: x / SG: x / pH: x  Gluc: 123 mg/dL / Ketone: x  / Bili: x / Urobili: x   Blood: x / Protein: x / Nitrite: x   Leuk Esterase: x / RBC: x / WBC x   Sq Epi: x / Non Sq Epi: x / Bacteria: x      CULTURES:          DEVICES:     RADIOLOGY: ***    CRITICAL CARE TIME SPENT: ***

## 2024-11-01 NOTE — PROGRESS NOTE ADULT - ATTENDING COMMENTS
CHF exacerbation  HTN emergency  alcohol withdrawal  metabolic encephalopathy  CARLI   Afib on eliquis     - continue to wean precedex  - started standing valium   - given CARLI, changed to heparin drip for AC  - continue diuretics  - BP improved  - will remain in MICU for now  - prognosis guarded

## 2024-11-01 NOTE — PROGRESS NOTE ADULT - CRITICAL CARE ATTENDING COMMENT
greater than 50% of time spent reviewing labs, notes, orders and radiographs, coordinating care  discussed with nursing, icu team   critically ill patient, actively managing sedatives, high risk for deterioration

## 2024-11-02 LAB
ALBUMIN SERPL ELPH-MCNC: 3.5 G/DL — SIGNIFICANT CHANGE UP (ref 3.3–5.2)
ALP SERPL-CCNC: 58 U/L — SIGNIFICANT CHANGE UP (ref 40–120)
ALT FLD-CCNC: 7 U/L — SIGNIFICANT CHANGE UP
ANION GAP SERPL CALC-SCNC: 13 MMOL/L — SIGNIFICANT CHANGE UP (ref 5–17)
APTT BLD: 55.9 SEC — HIGH (ref 24.5–35.6)
AST SERPL-CCNC: 10 U/L — SIGNIFICANT CHANGE UP
BASOPHILS # BLD AUTO: 0.03 K/UL — SIGNIFICANT CHANGE UP (ref 0–0.2)
BASOPHILS NFR BLD AUTO: 0.4 % — SIGNIFICANT CHANGE UP (ref 0–2)
BILIRUB SERPL-MCNC: 0.5 MG/DL — SIGNIFICANT CHANGE UP (ref 0.4–2)
BUN SERPL-MCNC: 30.8 MG/DL — HIGH (ref 8–20)
CALCIUM SERPL-MCNC: 8.8 MG/DL — SIGNIFICANT CHANGE UP (ref 8.4–10.5)
CHLORIDE SERPL-SCNC: 102 MMOL/L — SIGNIFICANT CHANGE UP (ref 96–108)
CO2 SERPL-SCNC: 27 MMOL/L — SIGNIFICANT CHANGE UP (ref 22–29)
CREAT SERPL-MCNC: 1.53 MG/DL — HIGH (ref 0.5–1.3)
EGFR: 50 ML/MIN/1.73M2 — LOW
EOSINOPHIL # BLD AUTO: 0.2 K/UL — SIGNIFICANT CHANGE UP (ref 0–0.5)
EOSINOPHIL NFR BLD AUTO: 2.8 % — SIGNIFICANT CHANGE UP (ref 0–6)
GLUCOSE SERPL-MCNC: 100 MG/DL — HIGH (ref 70–99)
HCT VFR BLD CALC: 43.4 % — SIGNIFICANT CHANGE UP (ref 39–50)
HGB BLD-MCNC: 14.2 G/DL — SIGNIFICANT CHANGE UP (ref 13–17)
IMM GRANULOCYTES NFR BLD AUTO: 0.4 % — SIGNIFICANT CHANGE UP (ref 0–0.9)
INR BLD: 1.1 RATIO — SIGNIFICANT CHANGE UP (ref 0.85–1.16)
LYMPHOCYTES # BLD AUTO: 0.88 K/UL — LOW (ref 1–3.3)
LYMPHOCYTES # BLD AUTO: 12.1 % — LOW (ref 13–44)
MAGNESIUM SERPL-MCNC: 2.2 MG/DL — SIGNIFICANT CHANGE UP (ref 1.6–2.6)
MCHC RBC-ENTMCNC: 28.1 PG — SIGNIFICANT CHANGE UP (ref 27–34)
MCHC RBC-ENTMCNC: 32.7 G/DL — SIGNIFICANT CHANGE UP (ref 32–36)
MCV RBC AUTO: 85.9 FL — SIGNIFICANT CHANGE UP (ref 80–100)
MONOCYTES # BLD AUTO: 0.76 K/UL — SIGNIFICANT CHANGE UP (ref 0–0.9)
MONOCYTES NFR BLD AUTO: 10.5 % — SIGNIFICANT CHANGE UP (ref 2–14)
NEUTROPHILS # BLD AUTO: 5.35 K/UL — SIGNIFICANT CHANGE UP (ref 1.8–7.4)
NEUTROPHILS NFR BLD AUTO: 73.8 % — SIGNIFICANT CHANGE UP (ref 43–77)
PHOSPHATE SERPL-MCNC: 4 MG/DL — SIGNIFICANT CHANGE UP (ref 2.4–4.7)
PLATELET # BLD AUTO: 217 K/UL — SIGNIFICANT CHANGE UP (ref 150–400)
POTASSIUM SERPL-MCNC: 3.4 MMOL/L — LOW (ref 3.5–5.3)
POTASSIUM SERPL-SCNC: 3.4 MMOL/L — LOW (ref 3.5–5.3)
PROT SERPL-MCNC: 6.7 G/DL — SIGNIFICANT CHANGE UP (ref 6.6–8.7)
PROTHROM AB SERPL-ACNC: 12.4 SEC — SIGNIFICANT CHANGE UP (ref 9.9–13.4)
RBC # BLD: 5.05 M/UL — SIGNIFICANT CHANGE UP (ref 4.2–5.8)
RBC # FLD: 13.9 % — SIGNIFICANT CHANGE UP (ref 10.3–14.5)
SODIUM SERPL-SCNC: 142 MMOL/L — SIGNIFICANT CHANGE UP (ref 135–145)
WBC # BLD: 7.25 K/UL — SIGNIFICANT CHANGE UP (ref 3.8–10.5)
WBC # FLD AUTO: 7.25 K/UL — SIGNIFICANT CHANGE UP (ref 3.8–10.5)

## 2024-11-02 PROCEDURE — 76775 US EXAM ABDO BACK WALL LIM: CPT | Mod: 26

## 2024-11-02 PROCEDURE — 99233 SBSQ HOSP IP/OBS HIGH 50: CPT

## 2024-11-02 PROCEDURE — 71250 CT THORAX DX C-: CPT | Mod: 26

## 2024-11-02 PROCEDURE — 70450 CT HEAD/BRAIN W/O DYE: CPT | Mod: 26

## 2024-11-02 PROCEDURE — 71045 X-RAY EXAM CHEST 1 VIEW: CPT | Mod: 26

## 2024-11-02 RX ORDER — HALOPERIDOL DECANOATE 50 MG/ML
5 INJECTION INTRAMUSCULAR EVERY 6 HOURS
Refills: 0 | Status: DISCONTINUED | OUTPATIENT
Start: 2024-11-02 | End: 2024-11-03

## 2024-11-02 RX ORDER — METOPROLOL TARTRATE 50 MG
5 TABLET ORAL EVERY 6 HOURS
Refills: 0 | Status: DISCONTINUED | OUTPATIENT
Start: 2024-11-02 | End: 2024-11-02

## 2024-11-02 RX ORDER — HYDRALAZINE HYDROCHLORIDE 50 MG/1
25 TABLET, FILM COATED ORAL EVERY 8 HOURS
Refills: 0 | Status: DISCONTINUED | OUTPATIENT
Start: 2024-11-02 | End: 2024-11-02

## 2024-11-02 RX ORDER — HYDRALAZINE HYDROCHLORIDE 50 MG/1
50 TABLET, FILM COATED ORAL EVERY 8 HOURS
Refills: 0 | Status: DISCONTINUED | OUTPATIENT
Start: 2024-11-02 | End: 2024-11-02

## 2024-11-02 RX ORDER — DIAZEPAM 10 MG/1
20 FILM BUCCAL ONCE
Refills: 0 | Status: DISCONTINUED | OUTPATIENT
Start: 2024-11-02 | End: 2024-11-02

## 2024-11-02 RX ORDER — FOLIC ACID 1 MG/1
1 TABLET ORAL DAILY
Refills: 0 | Status: DISCONTINUED | OUTPATIENT
Start: 2024-11-02 | End: 2024-11-06

## 2024-11-02 RX ORDER — METOPROLOL TARTRATE 50 MG
25 TABLET ORAL
Refills: 0 | Status: DISCONTINUED | OUTPATIENT
Start: 2024-11-02 | End: 2024-11-02

## 2024-11-02 RX ORDER — POTASSIUM CHLORIDE 10 MEQ
10 TABLET, EXTENDED RELEASE ORAL
Refills: 0 | Status: DISCONTINUED | OUTPATIENT
Start: 2024-11-02 | End: 2024-11-02

## 2024-11-02 RX ORDER — METOPROLOL TARTRATE 50 MG
50 TABLET ORAL
Refills: 0 | Status: DISCONTINUED | OUTPATIENT
Start: 2024-11-02 | End: 2024-11-02

## 2024-11-02 RX ORDER — HYDRALAZINE HYDROCHLORIDE 50 MG/1
50 TABLET, FILM COATED ORAL EVERY 8 HOURS
Refills: 0 | Status: DISCONTINUED | OUTPATIENT
Start: 2024-11-02 | End: 2024-11-06

## 2024-11-02 RX ORDER — APIXABAN 5 MG/1
5 TABLET, FILM COATED ORAL EVERY 12 HOURS
Refills: 0 | Status: DISCONTINUED | OUTPATIENT
Start: 2024-11-02 | End: 2024-11-06

## 2024-11-02 RX ORDER — CLONIDINE HYDROCHLORIDE 0.2 MG/1
0.1 TABLET ORAL EVERY 8 HOURS
Refills: 0 | Status: DISCONTINUED | OUTPATIENT
Start: 2024-11-02 | End: 2024-11-06

## 2024-11-02 RX ORDER — POTASSIUM CHLORIDE 10 MEQ
20 TABLET, EXTENDED RELEASE ORAL ONCE
Refills: 0 | Status: DISCONTINUED | OUTPATIENT
Start: 2024-11-02 | End: 2024-11-02

## 2024-11-02 RX ORDER — POTASSIUM CHLORIDE 10 MEQ
40 TABLET, EXTENDED RELEASE ORAL ONCE
Refills: 0 | Status: DISCONTINUED | OUTPATIENT
Start: 2024-11-02 | End: 2024-11-02

## 2024-11-02 RX ORDER — POTASSIUM CHLORIDE 10 MEQ
40 TABLET, EXTENDED RELEASE ORAL ONCE
Refills: 0 | Status: COMPLETED | OUTPATIENT
Start: 2024-11-02 | End: 2024-11-02

## 2024-11-02 RX ORDER — DIAZEPAM 10 MG/1
10 FILM BUCCAL EVERY 6 HOURS
Refills: 0 | Status: DISCONTINUED | OUTPATIENT
Start: 2024-11-02 | End: 2024-11-03

## 2024-11-02 RX ADMIN — Medication 40 MILLIGRAM(S): at 06:18

## 2024-11-02 RX ADMIN — Medication 40 MILLIEQUIVALENT(S): at 10:42

## 2024-11-02 RX ADMIN — HEPARIN SODIUM 1700 UNIT(S)/HR: 10000 INJECTION INTRAVENOUS; SUBCUTANEOUS at 08:07

## 2024-11-02 RX ADMIN — DIAZEPAM 10 MILLIGRAM(S): 10 FILM BUCCAL at 17:45

## 2024-11-02 RX ADMIN — CLONIDINE HYDROCHLORIDE 0.1 MILLIGRAM(S): 0.2 TABLET ORAL at 22:27

## 2024-11-02 RX ADMIN — QUETIAPINE FUMARATE 25 MILLIGRAM(S): 200 TABLET ORAL at 22:27

## 2024-11-02 RX ADMIN — HYDRALAZINE HYDROCHLORIDE 50 MILLIGRAM(S): 50 TABLET, FILM COATED ORAL at 22:27

## 2024-11-02 RX ADMIN — HYDRALAZINE HYDROCHLORIDE 25 MILLIGRAM(S): 50 TABLET, FILM COATED ORAL at 06:19

## 2024-11-02 RX ADMIN — HALOPERIDOL DECANOATE 5 MILLIGRAM(S): 50 INJECTION INTRAMUSCULAR at 08:54

## 2024-11-02 RX ADMIN — Medication 100 MILLIEQUIVALENT(S): at 10:15

## 2024-11-02 RX ADMIN — FOLIC ACID 1 MILLIGRAM(S): 1 TABLET ORAL at 11:30

## 2024-11-02 RX ADMIN — Medication 10 MILLIGRAM(S): at 06:19

## 2024-11-02 RX ADMIN — Medication 105 MILLIGRAM(S): at 14:32

## 2024-11-02 RX ADMIN — CHLORHEXIDINE GLUCONATE 1 APPLICATION(S): 40 SOLUTION TOPICAL at 06:28

## 2024-11-02 RX ADMIN — SERTRALINE HYDROCHLORIDE 50 MILLIGRAM(S): 50 TABLET, FILM COATED ORAL at 11:30

## 2024-11-02 RX ADMIN — HYDRALAZINE HYDROCHLORIDE 50 MILLIGRAM(S): 50 TABLET, FILM COATED ORAL at 14:32

## 2024-11-02 RX ADMIN — DIAZEPAM 10 MILLIGRAM(S): 10 FILM BUCCAL at 06:18

## 2024-11-02 RX ADMIN — HEPARIN SODIUM 1700 UNIT(S)/HR: 10000 INJECTION INTRAVENOUS; SUBCUTANEOUS at 06:27

## 2024-11-02 RX ADMIN — DIAZEPAM 20 MILLIGRAM(S): 10 FILM BUCCAL at 20:13

## 2024-11-02 RX ADMIN — HALOPERIDOL DECANOATE 5 MILLIGRAM(S): 50 INJECTION INTRAMUSCULAR at 22:38

## 2024-11-02 RX ADMIN — DIAZEPAM 20 MILLIGRAM(S): 10 FILM BUCCAL at 08:09

## 2024-11-02 RX ADMIN — NYSTATIN 1 APPLICATION(S): 100000 POWDER TOPICAL at 06:28

## 2024-11-02 RX ADMIN — DIAZEPAM 20 MILLIGRAM(S): 10 FILM BUCCAL at 12:59

## 2024-11-02 RX ADMIN — Medication 105 MILLIGRAM(S): at 22:27

## 2024-11-02 RX ADMIN — DIAZEPAM 20 MILLIGRAM(S): 10 FILM BUCCAL at 11:33

## 2024-11-02 RX ADMIN — Medication 20 MILLIGRAM(S): at 22:28

## 2024-11-02 RX ADMIN — APIXABAN 5 MILLIGRAM(S): 5 TABLET, FILM COATED ORAL at 17:45

## 2024-11-02 RX ADMIN — CLONIDINE HYDROCHLORIDE 0.1 MILLIGRAM(S): 0.2 TABLET ORAL at 06:20

## 2024-11-02 RX ADMIN — Medication 40 MILLIGRAM(S): at 17:45

## 2024-11-02 RX ADMIN — Medication 105 MILLIGRAM(S): at 06:19

## 2024-11-02 RX ADMIN — Medication 1 TABLET(S): at 11:30

## 2024-11-02 RX ADMIN — Medication 3 MILLIGRAM(S): at 22:28

## 2024-11-02 RX ADMIN — CLONIDINE HYDROCHLORIDE 0.1 MILLIGRAM(S): 0.2 TABLET ORAL at 14:32

## 2024-11-02 NOTE — DIETITIAN INITIAL EVALUATION ADULT - OTHER INFO
63 y/o M PMH of etoh abuse (hx of hospitalizations for withdrawal, no hx of withdraw seizures), HFpEF, HTN presented c/o SOB for the past few months. Pt reported to ED he has had difficulty laying flat, requiring more pillows to sleep and that he is noncompliant w/ his home medications, including lasix, last dose was at least a month ago. Patient has been drinking 4-6 large beer every day for the past 4 months. Last drink was 1 day ago. In ED patient endorsed feeling like he was going through withdrawal, endorsing feeling anxious, SOB and diaphoresis.  In ED, patient found to have  BNP >3000, trop 65, CXR significant for Bilateral perihilar opacities likely 2/2 pulm edema. Patient was admitted to medicine for CHF exacerbation and EtOH withdrawal. Noted to be hypertensive SBP 200s, given clonidine 0.1 and 10 IV labetalol, without improvement. MICU consulted for EtOH withdrawal, agitation, hypertensive emergency.

## 2024-11-02 NOTE — DIETITIAN INITIAL EVALUATION ADULT - PERTINENT LABORATORY DATA
11-02    142  |  102  |  30.8[H]  ----------------------------<  100[H]  3.4[L]   |  27.0  |  1.53[H]    Ca    8.8      02 Nov 2024 06:07  Phos  4.0     11-02  Mg     2.2     11-02    TPro  6.7  /  Alb  3.5  /  TBili  0.5  /  DBili  x   /  AST  10  /  ALT  7   /  AlkPhos  58  11-02

## 2024-11-02 NOTE — PROGRESS NOTE ADULT - SUBJECTIVE AND OBJECTIVE BOX
Patient is a 64y old  Male who presents with a chief complaint of Shortness of breath     (02 Nov 2024 10:28)    pt is awake,alert but confusion. calm    REVIEW OF SYSTEMS:unable-ams    MEDICATIONS  (STANDING):  amLODIPine   Tablet 10 milliGRAM(s) Oral daily  apixaban 5 milliGRAM(s) Oral every 12 hours  atorvastatin 20 milliGRAM(s) Oral at bedtime  chlorhexidine 2% Cloths 1 Application(s) Topical <User Schedule>  cloNIDine 0.1 milliGRAM(s) Oral every 8 hours  diazepam  Injectable 10 milliGRAM(s) IV Push every 6 hours  folic acid 1 milliGRAM(s) Oral daily  furosemide   Injectable 40 milliGRAM(s) IV Push every 12 hours  hydrALAZINE 50 milliGRAM(s) Oral every 8 hours  multivitamin 1 Tablet(s) Oral daily  nicotine - 21 mG/24Hr(s) Patch 21 Patch Transdermal daily  nystatin Powder 1 Application(s) Topical two times a day  QUEtiapine 25 milliGRAM(s) Oral at bedtime  sertraline 50 milliGRAM(s) Oral daily  thiamine IVPB 500 milliGRAM(s) IV Intermittent every 8 hours    MEDICATIONS  (PRN):  acetaminophen     Tablet .. 650 milliGRAM(s) Oral every 6 hours PRN Temp greater or equal to 38C (100.4F), Mild Pain (1 - 3)  aluminum hydroxide/magnesium hydroxide/simethicone Suspension 30 milliLiter(s) Oral every 4 hours PRN Dyspepsia  diazepam  Injectable 20 milliGRAM(s) IV Push every 4 hours PRN agitation  haloperidol    Injectable 5 milliGRAM(s) IV Push every 6 hours PRN agitation  hydrALAZINE Injectable 10 milliGRAM(s) IV Push every 4 hours PRN SBP > 180  melatonin 3 milliGRAM(s) Oral at bedtime PRN Insomnia  ondansetron Injectable 4 milliGRAM(s) IV Push every 8 hours PRN Nausea and/or Vomiting      CAPILLARY BLOOD GLUCOSE        I&O's Summary    01 Nov 2024 07:01  -  02 Nov 2024 07:00  --------------------------------------------------------  IN: 455.1 mL / OUT: 2050 mL / NET: -1594.9 mL    02 Nov 2024 08:01  -  02 Nov 2024 15:28  --------------------------------------------------------  IN: 34 mL / OUT: 910 mL / NET: -876 mL        PHYSICAL EXAM:  Vital Signs Last 24 Hrs  T(C): 36.7 (02 Nov 2024 11:38), Max: 37 (01 Nov 2024 23:00)  T(F): 98.1 (02 Nov 2024 11:38), Max: 98.6 (01 Nov 2024 23:00)  HR: 81 (02 Nov 2024 14:00) (49 - 91)  BP: 161/108 (02 Nov 2024 14:00) (111/79 - 164/91)  BP(mean): 113 (02 Nov 2024 14:00) (87 - 132)  RR: 17 (02 Nov 2024 14:00) (6 - 28)  SpO2: 100% (02 Nov 2024 14:00) (92% - 100%)    Parameters below as of 02 Nov 2024 12:00  Patient On (Oxygen Delivery Method): nasal cannula  O2 Flow (L/min): 2      CONSTITUTIONAL: NAD,  EYES: PERRLA; conjunctiva and sclera clear  ENMT: Moist oral mucosa,   RESPIRATORY: Normal respiratory effort;crackles to auscultation bilaterally  CARDIOVASCULAR: , normal S1 and S2, no murmur   EXTS: No lower extremity edema; Peripheral pulses are 2+ bilaterally  ABDOMEN: Nontender to palpation, normoactive bowel sounds, no rebound/guarding;   MUSCLOSKELETAL:    no joint swelling or tenderness to palpation  PSYCH: affect appropriate  NEUROLOGY: A+O to person, on/off place, and not to  time; no gross sensory deficits; limited exam ,follows few simple instruction      LABS:                        14.2   7.25  )-----------( 217      ( 02 Nov 2024 06:07 )             43.4     11-02    142  |  102  |  30.8[H]  ----------------------------<  100[H]  3.4[L]   |  27.0  |  1.53[H]    Ca    8.8      02 Nov 2024 06:07  Phos  4.0     11-02  Mg     2.2     11-02    TPro  6.7  /  Alb  3.5  /  TBili  0.5  /  DBili  x   /  AST  10  /  ALT  7   /  AlkPhos  58  11-02    PT/INR - ( 02 Nov 2024 06:07 )   PT: 12.4 sec;   INR: 1.10 ratio         PTT - ( 02 Nov 2024 06:07 )  PTT:55.9 sec      Urinalysis Basic - ( 02 Nov 2024 06:07 )    Color: x / Appearance: x / SG: x / pH: x  Gluc: 100 mg/dL / Ketone: x  / Bili: x / Urobili: x   Blood: x / Protein: x / Nitrite: x   Leuk Esterase: x / RBC: x / WBC x   Sq Epi: x / Non Sq Epi: x / Bacteria: x          RADIOLOGY & ADDITIONAL TESTS:  Results Reviewed:   < from: TTE W or WO Ultrasound Enhancing Agent (10.31.24 @ 20:44) >  < from: CT Chest No Cont (11.02.24 @ 14:24) >    IMPRESSION:    Small left and trace right pleural effusions.    Mildly increased right hilar lymph node. Otherwise unchanged nonspecific   intrathoracic lymphadenopathy and decreased upper abdominal   lymphadenopathy.    < end of copied text >  < from: CT Head No Cont (11.02.24 @ 14:23) >    IMPRESSION:  No CT evidence of acute intracranial pathology.    Severe confluent white matter hypodensity in both cerebral hemispheres is   stable from 09/27/2024.  The findings may represent severe microvascular   type changes however other etiologies of white matter leukoencephalopathy   are not excluded.    Chronic lacunar infarcts in the basal ganglionic regions, internal   capsules, subinsular white matter and thalami are stable from 09/27/2024.    < end of copied text >

## 2024-11-02 NOTE — SBIRT NOTE ADULT - NSSBIRTDRGPOSREINDET_GEN_A_CORE
Patient reports occasional cocaine use and occasional marijuana use. Pt denied concerns and declined resources.

## 2024-11-02 NOTE — DIETITIAN INITIAL EVALUATION ADULT - PERTINENT MEDS FT
MEDICATIONS  (STANDING):  cloNIDine 0.1 milliGRAM(s) Oral every 8 hours  diazepam  Injectable 10 milliGRAM(s) IV Push every 6 hours  folic acid Injectable 1 milliGRAM(s) IV Push daily  furosemide   Injectable 40 milliGRAM(s) IV Push every 12 hours  hydrALAZINE 25 milliGRAM(s) Oral three times a day  multivitamin 1 Tablet(s) Oral daily  potassium chloride  10 mEq/100 mL IVPB 10 milliEquivalent(s) IV Intermittent every 1 hour  sertraline 50 milliGRAM(s) Oral daily  thiamine IVPB 500 milliGRAM(s) IV Intermittent every 8 hours    MEDICATIONS  (PRN):  diazepam  Injectable 20 milliGRAM(s) IV Push every 4 hours PRN agitation  haloperidol    Injectable 5 milliGRAM(s) IV Push every 6 hours PRN agitation  hydrALAZINE Injectable 10 milliGRAM(s) IV Push every 4 hours PRN SBP > 180  ondansetron Injectable 4 milliGRAM(s) IV Push every 8 hours PRN Nausea and/or Vomiting

## 2024-11-02 NOTE — PROGRESS NOTE ADULT - TIME BILLING
greater than 50% of time spent reviewing labs, notes, orders and radiographs, coordinating care  discussed with nursing, icu team, consultants

## 2024-11-02 NOTE — PROGRESS NOTE ADULT - ATTENDING COMMENTS
CHF exacerbation  HTN emergency  alcohol withdrawal  metabolic encephalopathy  CARLI   Afib on eliquis     - off precedex  - restart eliquis  - start seroquel  - haldol PRN   - continue standing valium, taper as tolerate   - continue diuretics  - continue bp meds  - tolerating diet   - will downgrade to tele   - prognosis guarded . CHF exacerbation  HTN emergency  alcohol withdrawal  metabolic encephalopathy  CARLI   Afib on eliquis     - off precedex  - restart eliquis  - start seroquel  - haldol PRN   - continue standing valium, taper as tolerate   - continue diuretics  - continue bp meds  - tolerating diet   - psych consulted, to follow on monday to help with delirium/agitation/personality disorder   - will downgrade to tele   - prognosis guarded .

## 2024-11-02 NOTE — PROGRESS NOTE ADULT - ASSESSMENT
ASSESSMENT      PLAN:  #ProblemList    NEURO:    CV:  - Maintain MAP>65  - Cardiac monitor    RESP:  - Sating well on RA, continue to monitor on   - Aspiration precautions    GI:  - Protonix for GI ppx    RENAL:    ID:    Heme:    Endo:      DVT:  Diet:  Code Status: Full Code  Dispo:    Case discussed with MICU Attending:     ASSESSMENT  63 yo male with hx of ETOH abuse, chronic HFpEF, A-fib, HTN, presented initially with dyspnea to the hospital. Patient developed ETOH withdrawal, hypertensive emergency, transferred to the MICU.    PLAN:  # ETOH withdrawal  # HTN emergency   # Acute hypoxic respiratory failure 2/2 acute decompensated HFpEF  # A-fib   # CARLI Vs. CKD  #Depression/Anxiety       NEURO  - d/c precedex,   - C/w CIWA  - neuro checks q4h  - acteaminophen 650mg PO q6h prn for fever  - c/w melatonin 3mg PO at bedtime prn for insomnia    PSYCH  - nicotine patch  - sertraline 50mg PO daily  - started on seroquel 25mg PO at bedtime  - c/w diazepam 10 mg IVP q6h,   - c/w diazepam 20mg IV push q4hrs, PRN  - c/w haldol 5mg IVP q6h PRN   - behavioral issues concerned for possible undiagnosed psychiatric disease  - Megan consulted, will follow up on Monday    CVS  - start home Eliquis 5mg q12h   - Maintain MAP>65  - Cardiac monitor  - c/w hydralazine 50mg PO q8h  - c/w nicardipine 10mg PO daily  - c/w clonidine 0.1 mg PO q8h  - c/w lipitor 20mg PO at bedtime  - Goal of K>4, Phos >3, Mag >2. Replete as needed  - f/u TTE  - f/u EEG  - cardiology consulted, followed recs    PULM  - Sating well on RA, continue to monitor on   - Aspiration precautions  - c/w lasix 40mg IBP q12h for pulm edema 2/2 CHF    GI  - Protonix for GI ppx  - c/w thiamine  - c/w folate  - c/w multivitamin  - c/w zofran 4mg IVP q9h prn for nausea  - f/u hepatic panel  - Dietician consulted, following recs    RENAL/  - condom cath  - monitor I/Os  - monitor creatine      ID  - C/w nystatin powder , topical BID for groin  - trend CBC  - trend fever    DVT: Eliquis 5mg q12h  Diet: DASH/TLC  Code Status: Full Code  Dispo: MICU    Case discussed with MICU Attending: Dr. Mejia      ASSESSMENT  65 yo male with hx of ETOH abuse, chronic HFpEF, A-fib, HTN, presented initially with dyspnea to the hospital. Patient developed ETOH withdrawal, hypertensive emergency, transferred to the MICU.    PLAN:  # ETOH withdrawal  # HTN emergency   # Acute hypoxic respiratory failure 2/2 acute decompensated HFpEF  # A-fib   # CARLI Vs. CKD  #Depression/Anxiety   #Derilium      NEURO  - d/c precedex,   - C/w CIWA  - neuro checks q4h  - acteaminophen 650mg PO q6h prn for fever  - c/w melatonin 3mg PO at bedtime prn for insomnia  - f/u CT head    PSYCH  - nicotine patch  - sertraline 50mg PO daily  - started on seroquel 25mg PO at bedtime  - c/w diazepam 10 mg IVP q6h,   - c/w diazepam 20mg IV push q4hrs, PRN  - c/w haldol 5mg IVP q6h PRN   - behavioral issues concerned for possible undiagnosed psychiatric disease  - Megan consulted, will follow up on Monday    CVS  - start home Eliquis 5mg q12h   - Maintain MAP>65  - Cardiac monitor  - c/w hydralazine 50mg PO q8h  - c/w nicardipine 10mg PO daily  - c/w clonidine 0.1 mg PO q8h  - c/w lipitor 20mg PO at bedtime  - Goal of K>4, Phos >3, Mag >2. Replete as needed  - TTE showed LVEF 50-55%  - f/u EEG  - cardiology consulted, followed recs    PULM  - Sating well on RA, continue to monitor on   - Aspiration precautions  - c/w lasix 40mg IBP q12h for pulm edema 2/2 CHF    GI  - Protonix for GI ppx  - c/w thiamine  - c/w folate  - c/w multivitamin  - c/w zofran 4mg IVP q9h prn for nausea  - f/u hepatic panel  - Dietician consulted, following recs    RENAL/  - condom cath  - monitor I/Os  - monitor creatine  - renal US showed Simple left renal cyst measuring 3.5 cm; No hydronephrosis      ID  - C/w nystatin powder , topical BID for groin  - trend CBC  - trend fever    DVT: Eliquis 5mg q12h  Diet: DASH/TLC  Code Status: Full Code  Dispo: MICU    Case discussed with MICU Attending: Dr. Mejia      ASSESSMENT  65 yo male with hx of ETOH abuse, chronic HFpEF, A-fib, HTN, presented initially with dyspnea to the hospital. Patient developed ETOH withdrawal, hypertensive emergency, transferred to the MICU.    PLAN:  # ETOH withdrawal  # HTN emergency   # Acute hypoxic respiratory failure 2/2 acute decompensated HFpEF  # A-fib   # CARLI Vs. CKD  #Depression/Anxiety   #Derilium      NEURO  - d/c precedex,   - C/w CIWA  - neuro checks q4h  - acteaminophen 650mg PO q6h prn for fever  - c/w melatonin 3mg PO at bedtime prn for insomnia  - f/u CT head    PSYCH  - nicotine patch  - sertraline 50mg PO daily  - started on seroquel 25mg PO at bedtime  - c/w diazepam 10 mg IVP q6h,   - c/w diazepam 20mg IV push q4hrs, PRN  - c/w haldol 5mg IVP q6h PRN   - behavioral issues concerned for possible undiagnosed psychiatric disease  - Megan consulted, will follow up on Monday    CVS  - start home Eliquis 5mg q12h   - Maintain MAP>65  - Cardiac monitor  - c/w hydralazine 50mg PO q8h  - c/w amlodipine 10mg PO daily  - c/w clonidine 0.1 mg PO q8h  - c/w lipitor 20mg PO at bedtime  - Goal of K>4, Phos >3, Mag >2. Replete as needed  - TTE showed LVEF 50-55%  - f/u EEG  - [facility] consulted (Dr. Patel)- 11/2/24    PULM  - Sating well on RA, continue to monitor on   - Aspiration precautions  - c/w lasix 40mg IBP q12h for pulm edema 2/2 CHF    GI  - Protonix for GI ppx  - c/w thiamine  - c/w folate  - c/w multivitamin  - c/w zofran 4mg IVP q9h prn for nausea  - f/u hepatic panel  - Dietician consulted, following recs    RENAL/  - condom cath  - monitor I/Os  - monitor creatine  - renal US showed Simple left renal cyst measuring 3.5 cm; No hydronephrosis      ID  - C/w nystatin powder , topical BID for groin  - trend CBC  - trend fever    DVT: Eliquis 5mg q12h  Diet: DASH/TLC  Code Status: Full Code  Dispo: MICU    Case discussed with MICU Attending: Dr. Mejia      ASSESSMENT  63 yo male with hx of ETOH abuse, chronic HFpEF, A-fib, HTN, presented initially with dyspnea to the hospital. Patient developed ETOH withdrawal, hypertensive emergency, transferred to the MICU.    PLAN:  # ETOH withdrawal  # HTN emergency   # Acute hypoxic respiratory failure 2/2 acute decompensated HFpEF  # A-fib   # CARLI Vs. CKD  #Depression/Anxiety   #Derilium      NEURO  - d/c precedex,   - C/w CIWA  - neuro checks q4h  - acteaminophen 650mg PO q6h prn for fever  - c/w melatonin 3mg PO at bedtime prn for insomnia  - f/u CT head  - c/w diazepam 10 mg IVP q6h,   - c/w diazepam 20mg IV push q4hrs, PRN    PSYCH  - nicotine patch  - sertraline 50mg PO daily  - started on seroquel 25mg PO at bedtime  - c/w haldol 5mg IVP q6h PRN   - behavioral issues concerned for possible undiagnosed psychiatric disease  - Megan consulted, will follow up on Monday    CVS  - start home Eliquis 5mg q12h   - Maintain MAP>65  - Cardiac monitor  - c/w hydralazine 50mg PO q8h  - c/w amlodipine 10mg PO daily  - c/w clonidine 0.1 mg PO q8h  - c/w lipitor 20mg PO at bedtime  - Goal of K>4, Phos >3, Mag >2. Replete as needed  - TTE showed LVEF 50-55%  - f/u EEG  - [facility] consulted (Dr. Patel)- 11/2/24    PULM  - Sating well on RA, continue to monitor on   - Aspiration precautions  - c/w lasix 40mg IBP q12h for pulm edema 2/2 CHF    GI  - Protonix for GI ppx  - c/w thiamine  - c/w folate  - c/w multivitamin  - c/w zofran 4mg IVP q9h prn for nausea  - f/u hepatic panel  - Dietician consulted, following recs    RENAL/  - condom cath  - monitor I/Os  - monitor creatine  - renal US showed Simple left renal cyst measuring 3.5 cm; No hydronephrosis      ID  - C/w nystatin powder , topical BID for groin  - trend CBC  - trend fever    DVT: Eliquis 5mg q12h  Diet: DASH/TLC  Code Status: Full Code  Dispo: MICU    Case discussed with MICU Attending: Dr. Mejia

## 2024-11-02 NOTE — DIETITIAN INITIAL EVALUATION ADULT - NS FNS DIET ORDER
Diet, DASH/TLC:   Sodium & Cholesterol Restricted  1000mL Fluid Restriction ([id number]) (11-01-24 @ 22:04)

## 2024-11-02 NOTE — CHART NOTE - NSCHARTNOTEFT_GEN_A_CORE
Pt follows with Dr. Chu, [facility] cardiology group. ICU team made aware.

## 2024-11-02 NOTE — DIETITIAN INITIAL EVALUATION ADULT - ADD RECOMMEND
Encourage PO intake as appropriate when pt awake/alert; add Ensure Plus HP BID to supplement (350 kcals, 20 g pro each)  Continue Rx: MVI, thiamine, folic acid supplementation  Diet education at f/u as appropriate  Daily weights and I/Os

## 2024-11-02 NOTE — DIETITIAN INITIAL EVALUATION ADULT - ORAL INTAKE PTA/DIET HISTORY
Consult received for heart failure diet ed, aware pt is a STAR pt. Spoke with RN outside pt's room in ICU this morning, pt currently going through ETOH withdrawal, is confused and not appropriate for education at this time. MVI, thiamine and folic acid supplementation in place. Plan is to go for CT today. Pt has not been eating since admission. Current weight 203 lbs. NFPE not appropriate at this time, deferred to follow up as feasible. Hypokalemia noted, supplementing. RD remains available, will follow up for subjective interview as appropriate/schedule permits when pt more alert. At risk for malnutrition.

## 2024-11-02 NOTE — PROGRESS NOTE ADULT - SUBJECTIVE AND OBJECTIVE BOX
HPI:  65 y/o M PMH of etoh abuse (hx of hospitalizations for withdrawal, no hx of withdraw seizures), HFpEF, HTN presented c/o SOB for the past few months. Per documentation, medicine team unable to obtain information from pt as he is lethargic.  Pt reported to ED he has had difficulty laying flat, requiring more pillows to sleep and that he is noncompliant w/ his home medications, including lasix, last dose was at least a month ago. Patient has been drinking 4-6 large beer every day for the past 4 months. Last drink was 1 day ago. In ED patient endorsed feeling like he was going through withdrawal, endorsing feeling anxious, SOB and diaphoresis.  In ED, patient found to have  BNP >3000, trop 65, CXR significant for Bilateral perihilar opacities likely 2/2 pulm edema. Patient was admitted to medicine for CHF exacerbation and EtOH withdrawal, On evaluation by medicine team this morning, pt appears to be withdrawing. In total up to this point pt has received 100 librium, 4 ativan. Noted to be hypertensive SBP 200s, given clonidine 0.1 and 10 IV labetalol, without improvement. MICU consulted for EtOH withdrawal, agitation, hypertensive emergency.      EVENT in last 24 Hrs    REASON FOR CONSULT: ***    CONSULT REQUESTED BY: ***    Patient is a 64y old  Male who presents with a chief complaint of ETOH withdrawal / HTN emergency (01 Nov 2024 14:23)      HPI: ***    Events last 24 hours: ***    PAST MEDICAL & SURGICAL HISTORY:  HTN (hypertension)      CHF (congestive heart failure)      Atrial fibrillation      Depression, unspecified depression type      Pulmonary hypertension      Hyperlipidemia      Pericardial effusion        Allergies    No Known Allergies    Intolerances      FAMILY HISTORY:      Review of Systems:  CONSTITUTIONAL: No fever, chills, or fatigue  EYES: No eye pain, visual disturbances, or discharge  ENMT:  No difficulty hearing, tinnitus, vertigo; No sinus or throat pain  NECK: No pain or stiffness  RESPIRATORY: No cough, wheezing, chills or hemoptysis; No shortness of breath  CARDIOVASCULAR: No chest pain, palpitations, dizziness, or leg swelling  GASTROINTESTINAL: No abdominal or epigastric pain. No nausea, vomiting, or hematemesis; No diarrhea or constipation. No melena or hematochezia.  GENITOURINARY: No dysuria, frequency, hematuria, or incontinence  NEUROLOGICAL: No headaches, memory loss, loss of strength, numbness, or tremors  SKIN: No itching, burning, rashes, or lesions   MUSCULOSKELETAL: No joint pain or swelling; No muscle, back, or extremity pain  PSYCHIATRIC: No depression, anxiety, mood swings, or difficulty sleeping      Medications:    amLODIPine   Tablet 10 milliGRAM(s) Oral daily  cloNIDine 0.1 milliGRAM(s) Oral every 8 hours  furosemide   Injectable 40 milliGRAM(s) IV Push every 12 hours  hydrALAZINE 25 milliGRAM(s) Oral three times a day  hydrALAZINE Injectable 10 milliGRAM(s) IV Push every 4 hours PRN      acetaminophen     Tablet .. 650 milliGRAM(s) Oral every 6 hours PRN  diazepam  Injectable 10 milliGRAM(s) IV Push every 6 hours  diazepam  Injectable 20 milliGRAM(s) IV Push every 4 hours PRN  haloperidol    Injectable 5 milliGRAM(s) IntraMuscular every 6 hours PRN  melatonin 3 milliGRAM(s) Oral at bedtime PRN  ondansetron Injectable 4 milliGRAM(s) IV Push every 8 hours PRN  QUEtiapine 25 milliGRAM(s) Oral at bedtime  sertraline 50 milliGRAM(s) Oral daily      heparin  Infusion.  Unit(s)/Hr IV Continuous <Continuous>    aluminum hydroxide/magnesium hydroxide/simethicone Suspension 30 milliLiter(s) Oral every 4 hours PRN      atorvastatin 20 milliGRAM(s) Oral at bedtime    folic acid Injectable 1 milliGRAM(s) IV Push daily  multivitamin 1 Tablet(s) Oral daily  thiamine IVPB 500 milliGRAM(s) IV Intermittent every 8 hours      chlorhexidine 2% Cloths 1 Application(s) Topical <User Schedule>  nystatin Powder 1 Application(s) Topical two times a day    nicotine - 21 mG/24Hr(s) Patch 21 Patch Transdermal daily          ICU Vital Signs Last 24 Hrs  T(C): 36.8 (02 Nov 2024 03:45), Max: 37 (01 Nov 2024 23:00)  T(F): 98.2 (02 Nov 2024 03:45), Max: 98.6 (01 Nov 2024 23:00)  HR: 71 (02 Nov 2024 08:00) (49 - 71)  BP: 157/94 (02 Nov 2024 08:00) (111/79 - 160/118)  BP(mean): 111 (02 Nov 2024 08:00) (87 - 132)  ABP: --  ABP(mean): --  RR: 22 (02 Nov 2024 08:00) (6 - 28)  SpO2: 98% (02 Nov 2024 08:00) (86% - 100%)    O2 Parameters below as of 02 Nov 2024 08:00  Patient On (Oxygen Delivery Method): nasal cannula  O2 Flow (L/min): 3        Vital Signs Last 24 Hrs  T(C): 36.8 (02 Nov 2024 03:45), Max: 37 (01 Nov 2024 23:00)  T(F): 98.2 (02 Nov 2024 03:45), Max: 98.6 (01 Nov 2024 23:00)  HR: 71 (02 Nov 2024 08:00) (49 - 71)  BP: 157/94 (02 Nov 2024 08:00) (111/79 - 160/118)  BP(mean): 111 (02 Nov 2024 08:00) (87 - 132)  RR: 22 (02 Nov 2024 08:00) (6 - 28)  SpO2: 98% (02 Nov 2024 08:00) (86% - 100%)    Parameters below as of 02 Nov 2024 08:00  Patient On (Oxygen Delivery Method): nasal cannula  O2 Flow (L/min): 3      Physical Examination:    General: No acute distress.  Alert, oriented, interactive, nonfocal    HEENT: Pupils equal, reactive to light.  Symmetric.    PULM: Clear to auscultation bilaterally, no significant sputum production    CVS: Regular rate and rhythm, no murmurs, rubs, or gallops    ABD: Soft, nondistended, nontender, normoactive bowel sounds, no masses    EXT: No edema, nontender    SKIN: Warm and well perfused, no rashes noted.    ABG - ( 31 Oct 2024 10:05 )  pH, Arterial: 7.500 pH, Blood: x     /  pCO2: 40    /  pO2: 107   / HCO3: 31    / Base Excess: 8.0   /  SaO2: 99.9                I&O's Detail    01 Nov 2024 07:01  -  02 Nov 2024 07:00  --------------------------------------------------------  IN:    Dexmedetomidine: 211.1 mL    Heparin Infusion: 244 mL  Total IN: 455.1 mL    OUT:    Voided (mL): 2050 mL  Total OUT: 2050 mL    Total NET: -1594.9 mL            LABS:                        14.2   7.25  )-----------( 217      ( 02 Nov 2024 06:07 )             43.4     11-02    142  |  102  |  30.8[H]  ----------------------------<  100[H]  3.4[L]   |  27.0  |  1.53[H]    Ca    8.8      02 Nov 2024 06:07  Phos  4.0     11-02  Mg     2.2     11-02    TPro  6.7  /  Alb  3.5  /  TBili  0.5  /  DBili  x   /  AST  10  /  ALT  7   /  AlkPhos  58  11-02          CAPILLARY BLOOD GLUCOSE        PT/INR - ( 02 Nov 2024 06:07 )   PT: 12.4 sec;   INR: 1.10 ratio         PTT - ( 02 Nov 2024 06:07 )  PTT:55.9 sec  Urinalysis Basic - ( 02 Nov 2024 06:07 )    Color: x / Appearance: x / SG: x / pH: x  Gluc: 100 mg/dL / Ketone: x  / Bili: x / Urobili: x   Blood: x / Protein: x / Nitrite: x   Leuk Esterase: x / RBC: x / WBC x   Sq Epi: x / Non Sq Epi: x / Bacteria: x      CULTURES:        RADIOLOGY: ***    CRITICAL CARE TIME SPENT: ***   HPI:  63 y/o M PMH of etoh abuse (hx of hospitalizations for withdrawal, no hx of withdraw seizures), HFpEF, HTN presented c/o SOB for the past few months. Per documentation, medicine team unable to obtain information from pt as he is lethargic.  Pt reported to ED he has had difficulty laying flat, requiring more pillows to sleep and that he is noncompliant w/ his home medications, including lasix, last dose was at least a month ago. Patient has been drinking 4-6 large beer every day for the past 4 months. Last drink was 1 day ago. In ED patient endorsed feeling like he was going through withdrawal, endorsing feeling anxious, SOB and diaphoresis.  In ED, patient found to have  BNP >3000, trop 65, CXR significant for Bilateral perihilar opacities likely 2/2 pulm edema. Patient was admitted to medicine for CHF exacerbation and EtOH withdrawal, On evaluation by medicine team this morning, pt appears to be withdrawing. In total up to this point pt has received 100 librium, 4 ativan. Noted to be hypertensive SBP 200s, given clonidine 0.1 and 10 IV labetalol, without improvement. MICU consulted for EtOH withdrawal, agitation, hypertensive emergency.      EVENT in last 24 Hrs  Overnight patient was start on Seroquel and haldol, no longer thought to be in EtOH withdrawl so precedex discontinued. On heparin drip for afib. Patient was seen at bedside this AM, in no acute respiratory distress, although was agitated, no visual/auditory hallucinations or tremors noted. No HI/SI. Given valium with no improvement, followed by haldol. Found to be hypertensive with SBP 170s, given hydralazine. Scheduled for CT today.         PAST MEDICAL & SURGICAL HISTORY:  HTN (hypertension)  CHF (congestive heart failure)  Atrial fibrillation  Depression, unspecified depression type  Pulmonary hypertension  Hyperlipidemia  Pericardial effusion    Allergies - No Known Allergies    Intolerances      FAMILY HISTORY:      Review of Systems:  CONSTITUTIONAL: No fever, chills, or fatigue  EYES: No eye pain, visual disturbances, or discharge  ENMT:  No difficulty hearing, tinnitus, vertigo; No sinus or throat pain  NECK: No pain or stiffness  RESPIRATORY: No cough, wheezing, chills or hemoptysis; No shortness of breath  CARDIOVASCULAR: No chest pain, palpitations, dizziness, or leg swelling  GASTROINTESTINAL: No abdominal or epigastric pain. No nausea, vomiting, or hematemesis; No diarrhea or constipation. No melena or hematochezia.  GENITOURINARY: No dysuria, frequency, hematuria, or incontinence  NEUROLOGICAL: No headaches, memory loss, loss of strength, numbness, or tremors  SKIN: No itching, burning, rashes, or lesions   MUSCULOSKELETAL: No joint pain or swelling; No muscle, back, or extremity pain  PSYCHIATRIC: No depression, anxiety, mood swings, or difficulty sleeping      Medications:    amLODIPine   Tablet 10 milliGRAM(s) Oral daily  cloNIDine 0.1 milliGRAM(s) Oral every 8 hours  furosemide   Injectable 40 milliGRAM(s) IV Push every 12 hours  hydrALAZINE 25 milliGRAM(s) Oral three times a day  hydrALAZINE Injectable 10 milliGRAM(s) IV Push every 4 hours PRN  acetaminophen     Tablet .. 650 milliGRAM(s) Oral every 6 hours PRN  diazepam  Injectable 10 milliGRAM(s) IV Push every 6 hours  diazepam  Injectable 20 milliGRAM(s) IV Push every 4 hours PRN  haloperidol    Injectable 5 milliGRAM(s) IntraMuscular every 6 hours PRN  melatonin 3 milliGRAM(s) Oral at bedtime PRN  ondansetron Injectable 4 milliGRAM(s) IV Push every 8 hours PRN  QUEtiapine 25 milliGRAM(s) Oral at bedtime  sertraline 50 milliGRAM(s) Oral daily  heparin  Infusion.  Unit(s)/Hr IV Continuous <Continuous>  aluminum hydroxide/magnesium hydroxide/simethicone Suspension 30 milliLiter(s) Oral every 4 hours PRN  atorvastatin 20 milliGRAM(s) Oral at bedtime  folic acid Injectable 1 milliGRAM(s) IV Push daily  multivitamin 1 Tablet(s) Oral daily  thiamine IVPB 500 milliGRAM(s) IV Intermittent every 8 hours  chlorhexidine 2% Cloths 1 Application(s) Topical <User Schedule>  nystatin Powder 1 Application(s) Topical two times a day  nicotine - 21 mG/24Hr(s) Patch 21 Patch Transdermal daily          ICU Vital Signs Last 24 Hrs  T(C): 36.8 (02 Nov 2024 03:45), Max: 37 (01 Nov 2024 23:00)  T(F): 98.2 (02 Nov 2024 03:45), Max: 98.6 (01 Nov 2024 23:00)  HR: 71 (02 Nov 2024 08:00) (49 - 71)  BP: 157/94 (02 Nov 2024 08:00) (111/79 - 160/118)  BP(mean): 111 (02 Nov 2024 08:00) (87 - 132)  ABP: --  ABP(mean): --  RR: 22 (02 Nov 2024 08:00) (6 - 28)  SpO2: 98% (02 Nov 2024 08:00) (86% - 100%)    O2 Parameters below as of 02 Nov 2024 08:00  Patient On (Oxygen Delivery Method): nasal cannula  O2 Flow (L/min): 3        Vital Signs Last 24 Hrs  T(C): 36.8 (02 Nov 2024 03:45), Max: 37 (01 Nov 2024 23:00)  T(F): 98.2 (02 Nov 2024 03:45), Max: 98.6 (01 Nov 2024 23:00)  HR: 71 (02 Nov 2024 08:00) (49 - 71)  BP: 157/94 (02 Nov 2024 08:00) (111/79 - 160/118)  BP(mean): 111 (02 Nov 2024 08:00) (87 - 132)  RR: 22 (02 Nov 2024 08:00) (6 - 28)  SpO2: 98% (02 Nov 2024 08:00) (86% - 100%)    Parameters below as of 02 Nov 2024 08:00  Patient On (Oxygen Delivery Method): nasal cannula  O2 Flow (L/min): 3      Physical Examination:    General: No acute distress.  Alert, oriented, interactive, nonfocal    HEENT: Pupils equal, reactive to light.  Symmetric.    PULM: Clear to auscultation bilaterally, no significant sputum production    CVS: Regular rate and rhythm, no murmurs, rubs, or gallops    ABD: Soft, nondistended, nontender, normoactive bowel sounds, no masses    EXT: No edema, nontender    SKIN: Warm and well perfused, no rashes noted.    ABG - ( 31 Oct 2024 10:05 )  pH, Arterial: 7.500 pH, Blood: x     /  pCO2: 40    /  pO2: 107   / HCO3: 31    / Base Excess: 8.0   /  SaO2: 99.9                I&O's Detail    01 Nov 2024 07:01  -  02 Nov 2024 07:00  --------------------------------------------------------  IN:    Dexmedetomidine: 211.1 mL    Heparin Infusion: 244 mL  Total IN: 455.1 mL    OUT:    Voided (mL): 2050 mL  Total OUT: 2050 mL    Total NET: -1594.9 mL            LABS:                        14.2   7.25  )-----------( 217      ( 02 Nov 2024 06:07 )             43.4     11-02    142  |  102  |  30.8[H]  ----------------------------<  100[H]  3.4[L]   |  27.0  |  1.53[H]    Ca    8.8      02 Nov 2024 06:07  Phos  4.0     11-02  Mg     2.2     11-02    TPro  6.7  /  Alb  3.5  /  TBili  0.5  /  DBili  x   /  AST  10  /  ALT  7   /  AlkPhos  58  11-02          CAPILLARY BLOOD GLUCOSE        PT/INR - ( 02 Nov 2024 06:07 )   PT: 12.4 sec;   INR: 1.10 ratio         PTT - ( 02 Nov 2024 06:07 )  PTT:55.9 sec  Urinalysis Basic - ( 02 Nov 2024 06:07 )    Color: x / Appearance: x / SG: x / pH: x  Gluc: 100 mg/dL / Ketone: x  / Bili: x / Urobili: x   Blood: x / Protein: x / Nitrite: x   Leuk Esterase: x / RBC: x / WBC x   Sq Epi: x / Non Sq Epi: x / Bacteria: x      CULTURES:        RADIOLOGY: ***    CRITICAL CARE TIME SPENT: ***   HPI:  63 y/o M PMH of etoh abuse (hx of hospitalizations for withdrawal, no hx of withdraw seizures), HFpEF, HTN presented c/o SOB for the past few months. Per documentation, medicine team unable to obtain information from pt as he is lethargic.  Pt reported to ED he has had difficulty laying flat, requiring more pillows to sleep and that he is noncompliant w/ his home medications, including lasix, last dose was at least a month ago. Patient has been drinking 4-6 large beer every day for the past 4 months. Last drink was 1 day ago. In ED patient endorsed feeling like he was going through withdrawal, endorsing feeling anxious, SOB and diaphoresis.  In ED, patient found to have  BNP >3000, trop 65, CXR significant for Bilateral perihilar opacities likely 2/2 pulm edema. Patient was admitted to medicine for CHF exacerbation and EtOH withdrawal, On evaluation by medicine team this morning, pt appears to be withdrawing. In total up to this point pt has received 100 librium, 4 ativan. Noted to be hypertensive SBP 200s, given clonidine 0.1 and 10 IV labetalol, without improvement. MICU consulted for EtOH withdrawal, agitation, hypertensive emergency.      EVENT in last 24 Hrs  Overnight patient was start on Seroquel and haldol, no longer thought to be in EtOH withdrawl so precedex discontinued. On heparin drip for afib. Patient was seen at bedside this AM, in no acute respiratory distress, although was agitated, no visual/auditory hallucinations or tremors noted. No HI/SI. Given valium with no improvement, followed by haldol. Found to be hypertensive with SBP 170s, given hydralazine. Scheduled for CT today. Patient oriented to person, place and year, [place] 3        PAST MEDICAL & SURGICAL HISTORY:  HTN (hypertension)  CHF (congestive heart failure)  Atrial fibrillation  Depression, unspecified depression type  Pulmonary hypertension  Hyperlipidemia  Pericardial effusion    Allergies - No Known Allergies    Intolerances      FAMILY HISTORY:      Review of Systems:  CONSTITUTIONAL: No fever, chills, or fatigue  EYES: No eye pain, visual disturbances, or discharge  ENMT:  No difficulty hearing, tinnitus, vertigo; No sinus or throat pain  NECK: No pain or stiffness  RESPIRATORY: No cough, wheezing, chills or hemoptysis; No shortness of breath  CARDIOVASCULAR: No chest pain, palpitations, dizziness, or leg swelling  GASTROINTESTINAL: No abdominal or epigastric pain. No nausea, vomiting, or hematemesis; No diarrhea or constipation. No melena or hematochezia.  GENITOURINARY: No dysuria, frequency, hematuria, or incontinence  NEUROLOGICAL: No headaches, memory loss, loss of strength, numbness, or tremors  SKIN: No itching, burning, rashes, or lesions   MUSCULOSKELETAL: No joint pain or swelling; No muscle, back, or extremity pain  PSYCHIATRIC: No depression, anxiety, mood swings, or difficulty sleeping      Medications:    amLODIPine   Tablet 10 milliGRAM(s) Oral daily  cloNIDine 0.1 milliGRAM(s) Oral every 8 hours  furosemide   Injectable 40 milliGRAM(s) IV Push every 12 hours  hydrALAZINE 25 milliGRAM(s) Oral three times a day  hydrALAZINE Injectable 10 milliGRAM(s) IV Push every 4 hours PRN  acetaminophen     Tablet .. 650 milliGRAM(s) Oral every 6 hours PRN  diazepam  Injectable 10 milliGRAM(s) IV Push every 6 hours  diazepam  Injectable 20 milliGRAM(s) IV Push every 4 hours PRN  haloperidol    Injectable 5 milliGRAM(s) IntraMuscular every 6 hours PRN  melatonin 3 milliGRAM(s) Oral at bedtime PRN  ondansetron Injectable 4 milliGRAM(s) IV Push every 8 hours PRN  QUEtiapine 25 milliGRAM(s) Oral at bedtime  sertraline 50 milliGRAM(s) Oral daily  heparin  Infusion.  Unit(s)/Hr IV Continuous <Continuous>  aluminum hydroxide/magnesium hydroxide/simethicone Suspension 30 milliLiter(s) Oral every 4 hours PRN  atorvastatin 20 milliGRAM(s) Oral at bedtime  folic acid Injectable 1 milliGRAM(s) IV Push daily  multivitamin 1 Tablet(s) Oral daily  thiamine IVPB 500 milliGRAM(s) IV Intermittent every 8 hours  chlorhexidine 2% Cloths 1 Application(s) Topical <User Schedule>  nystatin Powder 1 Application(s) Topical two times a day  nicotine - 21 mG/24Hr(s) Patch 21 Patch Transdermal daily          ICU Vital Signs Last 24 Hrs  T(C): 36.8 (02 Nov 2024 03:45), Max: 37 (01 Nov 2024 23:00)  T(F): 98.2 (02 Nov 2024 03:45), Max: 98.6 (01 Nov 2024 23:00)  HR: 71 (02 Nov 2024 08:00) (49 - 71)  BP: 157/94 (02 Nov 2024 08:00) (111/79 - 160/118)  BP(mean): 111 (02 Nov 2024 08:00) (87 - 132)  ABP: --  ABP(mean): --  RR: 22 (02 Nov 2024 08:00) (6 - 28)  SpO2: 98% (02 Nov 2024 08:00) (86% - 100%)    O2 Parameters below as of 02 Nov 2024 08:00  Patient On (Oxygen Delivery Method): nasal cannula  O2 Flow (L/min): 3        Vital Signs Last 24 Hrs  T(C): 36.8 (02 Nov 2024 03:45), Max: 37 (01 Nov 2024 23:00)  T(F): 98.2 (02 Nov 2024 03:45), Max: 98.6 (01 Nov 2024 23:00)  HR: 71 (02 Nov 2024 08:00) (49 - 71)  BP: 157/94 (02 Nov 2024 08:00) (111/79 - 160/118)  BP(mean): 111 (02 Nov 2024 08:00) (87 - 132)  RR: 22 (02 Nov 2024 08:00) (6 - 28)  SpO2: 98% (02 Nov 2024 08:00) (86% - 100%)    Parameters below as of 02 Nov 2024 08:00  Patient On (Oxygen Delivery Method): nasal cannula  O2 Flow (L/min): 3      Physical Examination:    General: No acute distress.  Alert, oriented, interactive, nonfocal    HEENT: Pupils equal, reactive to light.  Symmetric.    PULM: Clear to auscultation bilaterally, no significant sputum production    CVS: Regular rate and rhythm, no murmurs, rubs, or gallops    ABD: Soft, nondistended, nontender, normoactive bowel sounds, no masses    EXT: No edema, nontender    SKIN: Warm and well perfused, no rashes noted.    ABG - ( 31 Oct 2024 10:05 )  pH, Arterial: 7.500 pH, Blood: x     /  pCO2: 40    /  pO2: 107   / HCO3: 31    / Base Excess: 8.0   /  SaO2: 99.9                I&O's Detail    01 Nov 2024 07:01  -  02 Nov 2024 07:00  --------------------------------------------------------  IN:    Dexmedetomidine: 211.1 mL    Heparin Infusion: 244 mL  Total IN: 455.1 mL    OUT:    Voided (mL): 2050 mL  Total OUT: 2050 mL    Total NET: -1594.9 mL            LABS:                        14.2   7.25  )-----------( 217      ( 02 Nov 2024 06:07 )             43.4     11-02    142  |  102  |  30.8[H]  ----------------------------<  100[H]  3.4[L]   |  27.0  |  1.53[H]    Ca    8.8      02 Nov 2024 06:07  Phos  4.0     11-02  Mg     2.2     11-02    TPro  6.7  /  Alb  3.5  /  TBili  0.5  /  DBili  x   /  AST  10  /  ALT  7   /  AlkPhos  58  11-02          CAPILLARY BLOOD GLUCOSE        PT/INR - ( 02 Nov 2024 06:07 )   PT: 12.4 sec;   INR: 1.10 ratio         PTT - ( 02 Nov 2024 06:07 )  PTT:55.9 sec  Urinalysis Basic - ( 02 Nov 2024 06:07 )    Color: x / Appearance: x / SG: x / pH: x  Gluc: 100 mg/dL / Ketone: x  / Bili: x / Urobili: x   Blood: x / Protein: x / Nitrite: x   Leuk Esterase: x / RBC: x / WBC x   Sq Epi: x / Non Sq Epi: x / Bacteria: x      CULTURES:        RADIOLOGY: ***    CRITICAL CARE TIME SPENT: ***

## 2024-11-02 NOTE — SBIRT NOTE ADULT - NSSBIRTALCPASSREFTXDET_GEN_A_CORE
Patient reports he drinks daily and will start with 2-3 beers and then move to vodka. Worker inquired if patient was interested in referral to inpatient or outpatient substance use. Pt declined. Pt states he plans to quit drinking altogether, however he has tried treatment in the past and does not feel it will work for him. Worker offered resources, pt declined and states he will work on getting sober on his own. SW to remain available as needed.

## 2024-11-02 NOTE — PROGRESS NOTE ADULT - ASSESSMENT
63 yo male with hx of ETOH abuse, chronic HFpEF, A-fib on Eliquis, HTN, presented initially with dyspnea to the hospital.Amitted to medicine for acute hypoxic respiratory failure sec acute on chronic HFpEF,hypertensive urgency,alcohol withdrawal later transferred to the MICU for severe ETOH withdrawal, hypertensive emergency, s/p Precedex. Pt sbp still high side.currently on 2 l nc. Pt is aaox1-2 now. Downgrade to Medicine.      Acute hypoxic respiratory failure 2/2 acute decompensated HFpEF  - telemetry  - oxygen supplement.titrate off as tolerate   - on IV lasix for aggressive diuresis  - continue hydralazine,amlodipine,BB  - TTE EF 50-55%.MILD-MOD MR  - Monitor daily weights and strict I/O's   - Monitor renal function  - c/s SS cardiology by MICU Team today    HTN emergency   - Continue hydralazine,amlodipine,Clonidine,BB  - PRN IV hydralazine  - Monitor bp closely       ETOH withdrawal  AMS likely from alcohol induce delirium   - CIWA protocol  -Last CIWA 2 per MICU Team  - neuro checks q4h  -  CT head no acute cva  - s/p precedex,   - c/w diazepam 10 mg IVP q6h,   - c/w diazepam 20mg IV push q4hrs, PRN  - prn haldol   - c/w thiamine  - c/w folate  - c/w multivitamin    A-fib rvr  -tele  -resume metoprolol  -ELiquis  -c/s cardiology      CARLI   -? CKD  - Last known cr from 2022 was 1.20  -renal US showed Simple left renal cyst measuring 3.5 cm; No hydronephrosis  -monitor bmp    Depression/Anxiety   Derilium  - sertraline 50mg PO daily  - started on seroquel 25mg PO at bedtime  - c/w haldol 5mg IVP q6h PRN   - Pycynthia consulted, will follow up on Monday        Lung Nodule  -Ct chest Mildly increased right hilar lymph node. Otherwise unchanged nonspecific intrathoracic lymphadenopathy and decreased upper abdominal lymphadenopathy.  -F/U pulmonary out pt for f/u imaging.      GI ppx : Protonix for GI ppx  DVT: Eliquis     Case discussed with MICU team                 63 yo male with hx of ETOH abuse, chronic HFpEF, A-fib on Eliquis, HTN, presented initially with dyspnea to the hospital.Amitted to medicine for acute hypoxic respiratory failure sec acute on chronic HFpEF,hypertensive urgency,alcohol withdrawal later transferred to the MICU for severe ETOH withdrawal, hypertensive emergency, s/p Precedex. Pt sbp still high side.currently on 2 l nc. Pt is aaox1-2 now. Downgrade to Medicine.      Acute hypoxic respiratory failure 2/2 acute decompensated HFpEF  - telemetry  - oxygen supplement.titrate off as tolerate   - on IV lasix for aggressive diuresis  - continue hydralazine,amlodipine,BB  - TTE EF 50-55%.MILD-MOD MR  - Monitor daily weights and strict I/O's   - Monitor renal function  - c/s SS cardiology by MICU Team today    HTN emergency   - s/p nicardipine infusion  - Continue hydralazine,amlodipine,Clonidine,BB  - PRN IV hydralazine  - Monitor bp closely       ETOH withdrawal  AMS likely from alcohol induce delirium   - CIWA protocol  -Last CIWA 2 per MICU Team  - neuro checks q4h  -  CT head no acute cva  - s/p precedex,   - c/w diazepam 10 mg IVP q6h,   - c/w diazepam 20mg IV push q4hrs, PRN  - prn haldol   - c/w thiamine  - c/w folate  - c/w multivitamin  -will do eeg if mental status does not improve    A-fib rvr  -tele  -resume metoprolol  -ELiquis  -c/s cardiology      CARLI   -? CKD  - Last known cr from 2022 was 1.20  -renal US showed Simple left renal cyst measuring 3.5 cm; No hydronephrosis  -monitor bmp    Depression/Anxiety   Derilium  - sertraline 50mg PO daily  - started on seroquel 25mg PO at bedtime  - c/w haldol 5mg IVP q6h PRN   - Megan consulted, will follow up on Monday        Lung Nodule  -Ct chest Mildly increased right hilar lymph node. Otherwise unchanged nonspecific intrathoracic lymphadenopathy and decreased upper abdominal lymphadenopathy.  -F/U pulmonary out pt for f/u imaging.      GI ppx : Protonix for GI ppx  DVT: Eliquis     Case discussed with MICU team                 63 yo male with hx of ETOH abuse, chronic HFpEF, A-fib on Eliquis, HTN, presented initially with dyspnea to the hospital.Amitted to medicine for acute hypoxic respiratory failure sec acute on chronic HFpEF,hypertensive urgency,alcohol withdrawal later transferred to the MICU for severe ETOH withdrawal, hypertensive emergency, s/p Precedex. Pt sbp still high side.currently on 2 l nc. Pt is aaox1-2 now. Downgrade to Medicine.      Acute hypoxic respiratory failure 2/2 acute decompensated HFpEF  - telemetry  - oxygen supplement.titrate off as tolerate   - on IV lasix for aggressive diuresis  - continue hydralazine,amlodipine  - hold BB for bradycardia  - TTE EF 50-55%.MILD-MOD MR  - Monitor daily weights and strict I/O's   - Monitor renal function  - c/s SS cardiology by MICU Team today    HTN emergency   - s/p nicardipine infusion  - Continue hydralazine,amlodipine,Clonidine,BB  - PRN IV hydralazine  - Monitor bp closely       ETOH withdrawal  AMS likely from alcohol induce delirium   - CIWA protocol  -Last CIWA 2 per MICU Team  - neuro checks q4h  -  CT head no acute cva  - s/p precedex,   - c/w diazepam 10 mg IVP q6h,   - c/w diazepam 20mg IV push q4hrs, PRN  - prn haldol   - c/w thiamine  - c/w folate  - c/w multivitamin  -will do eeg if mental status does not improve    A-fib   -tele  -rate control  -metoprolol hold for bradycardia  -ELiquis  -c/s cardiology      CARLI   -? CKD  - Last known cr from 2022 was 1.20  -renal US showed Simple left renal cyst measuring 3.5 cm; No hydronephrosis  -monitor bmp    Depression/Anxiety   Derilium  - sertraline 50mg PO daily  - started on seroquel 25mg PO at bedtime  - c/w haldol 5mg IVP q6h PRN   - Megan consulted, will follow up on Monday        Lung Nodule  -Ct chest Mildly increased right hilar lymph node. Otherwise unchanged nonspecific intrathoracic lymphadenopathy and decreased upper abdominal lymphadenopathy.  -F/U pulmonary out pt for f/u imaging.      GI ppx : Protonix for GI ppx  DVT: Eliquis     Case discussed with MICU team

## 2024-11-03 LAB
ALBUMIN SERPL ELPH-MCNC: 3.5 G/DL — SIGNIFICANT CHANGE UP (ref 3.3–5.2)
ALP SERPL-CCNC: 62 U/L — SIGNIFICANT CHANGE UP (ref 40–120)
ALT FLD-CCNC: 6 U/L — SIGNIFICANT CHANGE UP
ANION GAP SERPL CALC-SCNC: 17 MMOL/L — SIGNIFICANT CHANGE UP (ref 5–17)
AST SERPL-CCNC: 12 U/L — SIGNIFICANT CHANGE UP
BASOPHILS # BLD AUTO: 0 K/UL — SIGNIFICANT CHANGE UP (ref 0–0.2)
BASOPHILS NFR BLD AUTO: 0 % — SIGNIFICANT CHANGE UP (ref 0–2)
BILIRUB SERPL-MCNC: 0.5 MG/DL — SIGNIFICANT CHANGE UP (ref 0.4–2)
BUN SERPL-MCNC: 35.2 MG/DL — HIGH (ref 8–20)
CALCIUM SERPL-MCNC: 9.1 MG/DL — SIGNIFICANT CHANGE UP (ref 8.4–10.5)
CHLORIDE SERPL-SCNC: 99 MMOL/L — SIGNIFICANT CHANGE UP (ref 96–108)
CO2 SERPL-SCNC: 23 MMOL/L — SIGNIFICANT CHANGE UP (ref 22–29)
CREAT SERPL-MCNC: 1.94 MG/DL — HIGH (ref 0.5–1.3)
EGFR: 38 ML/MIN/1.73M2 — LOW
EOSINOPHIL # BLD AUTO: 0.31 K/UL — SIGNIFICANT CHANGE UP (ref 0–0.5)
EOSINOPHIL NFR BLD AUTO: 3.6 % — SIGNIFICANT CHANGE UP (ref 0–6)
GIANT PLATELETS BLD QL SMEAR: PRESENT — SIGNIFICANT CHANGE UP
GLUCOSE SERPL-MCNC: 89 MG/DL — SIGNIFICANT CHANGE UP (ref 70–99)
HCT VFR BLD CALC: 42.7 % — SIGNIFICANT CHANGE UP (ref 39–50)
HGB BLD-MCNC: 13.8 G/DL — SIGNIFICANT CHANGE UP (ref 13–17)
LYMPHOCYTES # BLD AUTO: 0.23 K/UL — LOW (ref 1–3.3)
LYMPHOCYTES # BLD AUTO: 2.7 % — LOW (ref 13–44)
MAGNESIUM SERPL-MCNC: 2.2 MG/DL — SIGNIFICANT CHANGE UP (ref 1.8–2.6)
MANUAL SMEAR VERIFICATION: SIGNIFICANT CHANGE UP
MCHC RBC-ENTMCNC: 28 PG — SIGNIFICANT CHANGE UP (ref 27–34)
MCHC RBC-ENTMCNC: 32.3 G/DL — SIGNIFICANT CHANGE UP (ref 32–36)
MCV RBC AUTO: 86.6 FL — SIGNIFICANT CHANGE UP (ref 80–100)
MONOCYTES # BLD AUTO: 0.69 K/UL — SIGNIFICANT CHANGE UP (ref 0–0.9)
MONOCYTES NFR BLD AUTO: 8 % — SIGNIFICANT CHANGE UP (ref 2–14)
MYELOCYTES NFR BLD: 0.9 % — HIGH (ref 0–0)
NEUTROPHILS # BLD AUTO: 7.22 K/UL — SIGNIFICANT CHANGE UP (ref 1.8–7.4)
NEUTROPHILS NFR BLD AUTO: 83.9 % — HIGH (ref 43–77)
PHOSPHATE SERPL-MCNC: 4.2 MG/DL — SIGNIFICANT CHANGE UP (ref 2.4–4.7)
PLAT MORPH BLD: NORMAL — SIGNIFICANT CHANGE UP
PLATELET # BLD AUTO: 240 K/UL — SIGNIFICANT CHANGE UP (ref 150–400)
POTASSIUM SERPL-MCNC: 3.9 MMOL/L — SIGNIFICANT CHANGE UP (ref 3.5–5.3)
POTASSIUM SERPL-SCNC: 3.9 MMOL/L — SIGNIFICANT CHANGE UP (ref 3.5–5.3)
PROT SERPL-MCNC: 6.9 G/DL — SIGNIFICANT CHANGE UP (ref 6.6–8.7)
RBC # BLD: 4.93 M/UL — SIGNIFICANT CHANGE UP (ref 4.2–5.8)
RBC # FLD: 13.8 % — SIGNIFICANT CHANGE UP (ref 10.3–14.5)
RBC BLD AUTO: NORMAL — SIGNIFICANT CHANGE UP
SMUDGE CELLS # BLD: PRESENT — SIGNIFICANT CHANGE UP
SODIUM SERPL-SCNC: 139 MMOL/L — SIGNIFICANT CHANGE UP (ref 135–145)
VARIANT LYMPHS # BLD: 0.9 % — SIGNIFICANT CHANGE UP (ref 0–6)
WBC # BLD: 8.61 K/UL — SIGNIFICANT CHANGE UP (ref 3.8–10.5)
WBC # FLD AUTO: 8.61 K/UL — SIGNIFICANT CHANGE UP (ref 3.8–10.5)

## 2024-11-03 PROCEDURE — 99233 SBSQ HOSP IP/OBS HIGH 50: CPT

## 2024-11-03 RX ORDER — DIAZEPAM 10 MG/1
10 FILM BUCCAL ONCE
Refills: 0 | Status: DISCONTINUED | OUTPATIENT
Start: 2024-11-03 | End: 2024-11-03

## 2024-11-03 RX ORDER — DIAZEPAM 10 MG/1
10 FILM BUCCAL EVERY 12 HOURS
Refills: 0 | Status: DISCONTINUED | OUTPATIENT
Start: 2024-11-03 | End: 2024-11-05

## 2024-11-03 RX ADMIN — Medication 1 TABLET(S): at 13:14

## 2024-11-03 RX ADMIN — HYDRALAZINE HYDROCHLORIDE 50 MILLIGRAM(S): 50 TABLET, FILM COATED ORAL at 05:42

## 2024-11-03 RX ADMIN — Medication 105 MILLIGRAM(S): at 13:12

## 2024-11-03 RX ADMIN — DIAZEPAM 10 MILLIGRAM(S): 10 FILM BUCCAL at 00:34

## 2024-11-03 RX ADMIN — CHLORHEXIDINE GLUCONATE 1 APPLICATION(S): 40 SOLUTION TOPICAL at 05:42

## 2024-11-03 RX ADMIN — CLONIDINE HYDROCHLORIDE 0.1 MILLIGRAM(S): 0.2 TABLET ORAL at 13:15

## 2024-11-03 RX ADMIN — QUETIAPINE FUMARATE 25 MILLIGRAM(S): 200 TABLET ORAL at 20:49

## 2024-11-03 RX ADMIN — DIAZEPAM 10 MILLIGRAM(S): 10 FILM BUCCAL at 05:40

## 2024-11-03 RX ADMIN — NYSTATIN 1 APPLICATION(S): 100000 POWDER TOPICAL at 17:03

## 2024-11-03 RX ADMIN — SERTRALINE HYDROCHLORIDE 50 MILLIGRAM(S): 50 TABLET, FILM COATED ORAL at 13:14

## 2024-11-03 RX ADMIN — Medication 40 MILLIGRAM(S): at 05:42

## 2024-11-03 RX ADMIN — APIXABAN 5 MILLIGRAM(S): 5 TABLET, FILM COATED ORAL at 17:03

## 2024-11-03 RX ADMIN — Medication 20 MILLIGRAM(S): at 20:49

## 2024-11-03 RX ADMIN — CLONIDINE HYDROCHLORIDE 0.1 MILLIGRAM(S): 0.2 TABLET ORAL at 05:42

## 2024-11-03 RX ADMIN — DIAZEPAM 10 MILLIGRAM(S): 10 FILM BUCCAL at 17:07

## 2024-11-03 RX ADMIN — Medication 10 MILLIGRAM(S): at 05:42

## 2024-11-03 RX ADMIN — NYSTATIN 1 APPLICATION(S): 100000 POWDER TOPICAL at 05:42

## 2024-11-03 RX ADMIN — APIXABAN 5 MILLIGRAM(S): 5 TABLET, FILM COATED ORAL at 05:42

## 2024-11-03 RX ADMIN — HYDRALAZINE HYDROCHLORIDE 50 MILLIGRAM(S): 50 TABLET, FILM COATED ORAL at 13:13

## 2024-11-03 RX ADMIN — NICOTINE POLACRILEX 1 PATCH: 4 GUM, CHEWING ORAL at 13:12

## 2024-11-03 RX ADMIN — Medication 105 MILLIGRAM(S): at 23:13

## 2024-11-03 RX ADMIN — HYDRALAZINE HYDROCHLORIDE 50 MILLIGRAM(S): 50 TABLET, FILM COATED ORAL at 20:49

## 2024-11-03 RX ADMIN — CLONIDINE HYDROCHLORIDE 0.1 MILLIGRAM(S): 0.2 TABLET ORAL at 20:49

## 2024-11-03 RX ADMIN — Medication 3 MILLIGRAM(S): at 20:49

## 2024-11-03 RX ADMIN — DIAZEPAM 10 MILLIGRAM(S): 10 FILM BUCCAL at 21:15

## 2024-11-03 RX ADMIN — Medication 105 MILLIGRAM(S): at 05:40

## 2024-11-03 RX ADMIN — FOLIC ACID 1 MILLIGRAM(S): 1 TABLET ORAL at 13:14

## 2024-11-03 NOTE — PROGRESS NOTE ADULT - SUBJECTIVE AND OBJECTIVE BOX
Patient is a 64y old  Male who presents with a chief complaint of ETOH withdrawal / HTN emergency (02 Nov 2024 15:27)      INTERVAL HPI/OVERNIGHT EVENTS: seen and examined. Awake, follows commands, but not able to provide any ROS   Oriented only to self   Per RN, no events     MEDICATIONS  (STANDING):  amLODIPine   Tablet 10 milliGRAM(s) Oral daily  apixaban 5 milliGRAM(s) Oral every 12 hours  atorvastatin 20 milliGRAM(s) Oral at bedtime  chlorhexidine 2% Cloths 1 Application(s) Topical <User Schedule>  cloNIDine 0.1 milliGRAM(s) Oral every 8 hours  diazepam  Injectable 10 milliGRAM(s) IV Push every 12 hours  folic acid 1 milliGRAM(s) Oral daily  hydrALAZINE 50 milliGRAM(s) Oral every 8 hours  multivitamin 1 Tablet(s) Oral daily  nicotine - 21 mG/24Hr(s) Patch 21 Patch Transdermal daily  nystatin Powder 1 Application(s) Topical two times a day  QUEtiapine 25 milliGRAM(s) Oral at bedtime  sertraline 50 milliGRAM(s) Oral daily  thiamine IVPB 500 milliGRAM(s) IV Intermittent every 8 hours    MEDICATIONS  (PRN):  acetaminophen     Tablet .. 650 milliGRAM(s) Oral every 6 hours PRN Temp greater or equal to 38C (100.4F), Mild Pain (1 - 3)  aluminum hydroxide/magnesium hydroxide/simethicone Suspension 30 milliLiter(s) Oral every 4 hours PRN Dyspepsia  haloperidol    Injectable 5 milliGRAM(s) IV Push every 6 hours PRN agitation  hydrALAZINE Injectable 10 milliGRAM(s) IV Push every 4 hours PRN SBP > 180  melatonin 3 milliGRAM(s) Oral at bedtime PRN Insomnia  ondansetron Injectable 4 milliGRAM(s) IV Push every 8 hours PRN Nausea and/or Vomiting      Allergies    No Known Allergies    Intolerances        REVIEW OF SYSTEMS:  CONSTITUTIONAL: No fever, weight loss, or fatigue  RESPIRATORY: No cough, wheezing, chills or hemoptysis; No shortness of breath  CARDIOVASCULAR: No chest pain, palpitations, dizziness, or leg swelling  GASTROINTESTINAL: No abdominal or epigastric pain. No nausea, vomiting, or hematemesis; No diarrhea or constipation. No melena or hematochezia.  NEUROLOGICAL: No headaches, memory loss, loss of strength, numbness, or tremors  MUSCULOSKELETAL: No joint pain or swelling; No muscle, back, or extremity pain      Vital Signs Last 24 Hrs  T(C): 36.8 (03 Nov 2024 08:16), Max: 37.1 (02 Nov 2024 19:00)  T(F): 98.3 (03 Nov 2024 08:16), Max: 98.8 (03 Nov 2024 00:24)  HR: 76 (03 Nov 2024 08:16) (60 - 91)  BP: 144/73 (03 Nov 2024 08:16) (130/82 - 186/99)  BP(mean): 110 (02 Nov 2024 18:00) (98 - 118)  RR: 18 (03 Nov 2024 08:16) (17 - 22)  SpO2: 97% (03 Nov 2024 08:16) (93% - 100%)    Parameters below as of 03 Nov 2024 08:16  Patient On (Oxygen Delivery Method): nasal cannula        PHYSICAL EXAM:  GENERAL: NAD, laying in bed, very drowsy   HEAD:  Atraumatic, Normocephalic  EYES: EOMI, PERRLA, conjunctiva and sclera clear  NECK: Supple, No JVD  NERVOUS SYSTEM:  Alert & Oriented X1, No gross focal deficits  CHEST/LUNG: Clear to percussion bilaterally; No rales, rhonchi, wheezing, or rubs  HEART: Regular rate and rhythm; No murmurs, rubs, or gallops  ABDOMEN: Soft, Nontender, Nondistended; Bowel sounds present  EXTREMITIES:  No clubbing, cyanosis, or edema  SKIN: No rashes or lesions    LABS:                        13.8   8.61  )-----------( 240      ( 03 Nov 2024 05:12 )             42.7     11-03    139  |  99  |  35.2[H]  ----------------------------<  89  3.9   |  23.0  |  1.94[H]    Ca    9.1      03 Nov 2024 05:12  Phos  4.2     11-03  Mg     2.2     11-03    TPro  6.9  /  Alb  3.5  /  TBili  0.5  /  DBili  x   /  AST  12  /  ALT  6   /  AlkPhos  62  11-03    PT/INR - ( 02 Nov 2024 06:07 )   PT: 12.4 sec;   INR: 1.10 ratio         PTT - ( 02 Nov 2024 06:07 )  PTT:55.9 sec  Urinalysis Basic - ( 03 Nov 2024 05:12 )    Color: x / Appearance: x / SG: x / pH: x  Gluc: 89 mg/dL / Ketone: x  / Bili: x / Urobili: x   Blood: x / Protein: x / Nitrite: x   Leuk Esterase: x / RBC: x / WBC x   Sq Epi: x / Non Sq Epi: x / Bacteria: x      CAPILLARY BLOOD GLUCOSE          RADIOLOGY & ADDITIONAL TESTS:    Imaging Personally Reviewed:  [ x] YES  [ ] NO    Consultant(s) Notes Reviewed:  [ x] YES  [ ] NO    Care Discussed with Consultants/Other Providers [ ] YES  [ ] NO    Plan of Care discussed with Housestaff [x ]YES [ ] NO

## 2024-11-03 NOTE — CHART NOTE - NSCHARTNOTEFT_GEN_A_CORE
Pt seen and examined at bedside for episode severe anxiety  States he feels a sense that something bad is about to happen  Denies chest pain, SOB, n/v/d/c, abdominal pain, HA, dizziness, blurry vision  BP elevated 185/69, remainder of vital signs stable  CIWA score currently 9  10mg IV valium ordered  Also due for HS clonidine and hydralazine   RN to recheck BP ~30mins  Continue on CIWA protocol

## 2024-11-03 NOTE — PROGRESS NOTE ADULT - ASSESSMENT
65 yo male with hx of ETOH abuse, chronic HFpEF, A-fib on Eliquis, HTN, presented initially with dyspnea to the hospital.Amitted to medicine for acute hypoxic respiratory failure sec acute on chronic HFpEF,hypertensive urgency,alcohol withdrawal later transferred to the MICU for severe ETOH withdrawal, hypertensive emergency, s/p Precedex. Pt sbp still high side.currently on 2 l nc. Pt is aaox1-2 now. Downgrade to Medicine.      Acute hypoxic respiratory failure 2/2 acute decompensated HFpEF  Taper off supplemental O2   - TTE EF 50-55%.MILD-MOD MR  - Monitor daily weights and strict I/O's   Stop IV Furosemide, looks very dry on exam with uptrending Cr    c/w  hydralazine, amlodipine      HTN emergency - resolved   - s/p nicardipine infusion  - Continue hydralazine, amlodipine, Clonidine  d/c  PRN IV hydralazine        ETOH withdrawal  On CIWA protocol   CIWA score 4-5  Taper Diazepam to 10 mg Iv q12 and PRN   c/w Thiamin and folic acid       Persistent A-fib   Rate controlled   -metoprolol hold for bradycardia  -c/w Eliquis for anticoagulation         CARLI on CKD   Baseline Cr 1.2-1.3  US kidney done, results reviewed   Hold Furosemide   -monitor bmp    Depression/Anxiety   - sertraline 50mg PO daily  - started on seroquel 25mg PO at bedtime    Lung Nodule  -Ct chest Mildly increased right hilar lymph node. Otherwise unchanged nonspecific intrathoracic lymphadenopathy and decreased upper abdominal lymphadenopathy.  -F/U pulmonary out pt for f/u imaging.      GI ppx : Protonix for GI ppx  DVT: Eliquis

## 2024-11-04 LAB
ANION GAP SERPL CALC-SCNC: 13 MMOL/L — SIGNIFICANT CHANGE UP (ref 5–17)
BUN SERPL-MCNC: 38.8 MG/DL — HIGH (ref 8–20)
CALCIUM SERPL-MCNC: 8.8 MG/DL — SIGNIFICANT CHANGE UP (ref 8.4–10.5)
CHLORIDE SERPL-SCNC: 101 MMOL/L — SIGNIFICANT CHANGE UP (ref 96–108)
CO2 SERPL-SCNC: 26 MMOL/L — SIGNIFICANT CHANGE UP (ref 22–29)
CREAT SERPL-MCNC: 1.74 MG/DL — HIGH (ref 0.5–1.3)
EGFR: 43 ML/MIN/1.73M2 — LOW
GLUCOSE SERPL-MCNC: 113 MG/DL — HIGH (ref 70–99)
POTASSIUM SERPL-MCNC: 3.5 MMOL/L — SIGNIFICANT CHANGE UP (ref 3.5–5.3)
POTASSIUM SERPL-SCNC: 3.5 MMOL/L — SIGNIFICANT CHANGE UP (ref 3.5–5.3)
SODIUM SERPL-SCNC: 140 MMOL/L — SIGNIFICANT CHANGE UP (ref 135–145)

## 2024-11-04 PROCEDURE — 99233 SBSQ HOSP IP/OBS HIGH 50: CPT | Mod: GC

## 2024-11-04 PROCEDURE — 73030 X-RAY EXAM OF SHOULDER: CPT | Mod: 26,LT

## 2024-11-04 RX ORDER — DIAZEPAM 10 MG/1
10 FILM BUCCAL
Refills: 0 | Status: DISCONTINUED | OUTPATIENT
Start: 2024-11-04 | End: 2024-11-06

## 2024-11-04 RX ADMIN — NICOTINE POLACRILEX 1 PATCH: 4 GUM, CHEWING ORAL at 13:13

## 2024-11-04 RX ADMIN — NICOTINE POLACRILEX 21 PATCH: 4 GUM, CHEWING ORAL at 13:17

## 2024-11-04 RX ADMIN — NICOTINE POLACRILEX 21 PATCH: 4 GUM, CHEWING ORAL at 19:05

## 2024-11-04 RX ADMIN — CLONIDINE HYDROCHLORIDE 0.1 MILLIGRAM(S): 0.2 TABLET ORAL at 22:44

## 2024-11-04 RX ADMIN — NYSTATIN 1 APPLICATION(S): 100000 POWDER TOPICAL at 17:36

## 2024-11-04 RX ADMIN — HYDRALAZINE HYDROCHLORIDE 50 MILLIGRAM(S): 50 TABLET, FILM COATED ORAL at 13:14

## 2024-11-04 RX ADMIN — CLONIDINE HYDROCHLORIDE 0.1 MILLIGRAM(S): 0.2 TABLET ORAL at 05:22

## 2024-11-04 RX ADMIN — Medication 20 MILLIGRAM(S): at 22:44

## 2024-11-04 RX ADMIN — Medication 10 MILLIGRAM(S): at 05:22

## 2024-11-04 RX ADMIN — Medication 1 TABLET(S): at 13:12

## 2024-11-04 RX ADMIN — NICOTINE POLACRILEX 21 PATCH: 4 GUM, CHEWING ORAL at 07:25

## 2024-11-04 RX ADMIN — HYDRALAZINE HYDROCHLORIDE 50 MILLIGRAM(S): 50 TABLET, FILM COATED ORAL at 05:22

## 2024-11-04 RX ADMIN — CHLORHEXIDINE GLUCONATE 1 APPLICATION(S): 40 SOLUTION TOPICAL at 05:21

## 2024-11-04 RX ADMIN — Medication 105 MILLIGRAM(S): at 13:12

## 2024-11-04 RX ADMIN — DIAZEPAM 10 MILLIGRAM(S): 10 FILM BUCCAL at 05:22

## 2024-11-04 RX ADMIN — APIXABAN 5 MILLIGRAM(S): 5 TABLET, FILM COATED ORAL at 05:22

## 2024-11-04 RX ADMIN — NYSTATIN 1 APPLICATION(S): 100000 POWDER TOPICAL at 05:21

## 2024-11-04 RX ADMIN — QUETIAPINE FUMARATE 25 MILLIGRAM(S): 200 TABLET ORAL at 22:44

## 2024-11-04 RX ADMIN — SERTRALINE HYDROCHLORIDE 50 MILLIGRAM(S): 50 TABLET, FILM COATED ORAL at 13:12

## 2024-11-04 RX ADMIN — APIXABAN 5 MILLIGRAM(S): 5 TABLET, FILM COATED ORAL at 17:36

## 2024-11-04 RX ADMIN — FOLIC ACID 1 MILLIGRAM(S): 1 TABLET ORAL at 13:12

## 2024-11-04 RX ADMIN — DIAZEPAM 10 MILLIGRAM(S): 10 FILM BUCCAL at 17:35

## 2024-11-04 RX ADMIN — Medication 105 MILLIGRAM(S): at 05:22

## 2024-11-04 RX ADMIN — CLONIDINE HYDROCHLORIDE 0.1 MILLIGRAM(S): 0.2 TABLET ORAL at 13:14

## 2024-11-04 RX ADMIN — HYDRALAZINE HYDROCHLORIDE 50 MILLIGRAM(S): 50 TABLET, FILM COATED ORAL at 22:44

## 2024-11-04 NOTE — PHYSICAL THERAPY INITIAL EVALUATION ADULT - ACTIVE RANGE OF MOTION EXAMINATION, REHAB EVAL
decreased left shoulder flexion secondary to pain/Right LE Active ROM was WFL (within functional limits)/bilateral  lower extremity Active ROM was WFL (within functional limits)/deficits as listed below

## 2024-11-04 NOTE — PROGRESS NOTE ADULT - ATTENDING COMMENTS
Agree with above   Patient seen and examined together with medical residents. Today he is awake and responding to all questions. c/o left shoulder pain. CIWA score 5-12.   Vital signs,  labs and imaging  reviewed   Assessment and plan discussed with patient and residents   C/w CIWA protocol and taper Diazepam   Get xray of the left shoulder   Pain control.   Get out of bed to chair and PT evaluation

## 2024-11-04 NOTE — PROGRESS NOTE ADULT - ASSESSMENT
Patient is a 64y Male with PMHx HFpEF, Afib, HTN, and alcohol abuse admitted for AHRF secondary to HFpEF exacerbation.      #AHRF secondary to ADHFpEF  -TTE 10/31 showing LVEF 52% + LVH +dilated left and right atria + MR + TR  -currently on room air   -Furosemide paused due to improving overload status     #Alcohol withdrawal  -CIWA protocol  -Valium 10mg BID   -Valium 10mg q2 prn for CIWA > 8  -Thiamine 500mg TID   -Folic acid 1mg daily   -Multivitamin    #HTN  -Transferred to MICU for hypertensive urgency in the setting of alcohol withdrawal   -Amlodipine 10mg daily   -Clonidine 0.1mg TID   -hydralazine 50mg TID     #Afib   -Eliquis 5mg BID       #CKD   -Baseline Cr 1.2-1.3   -Renal US negative for hydronephrosis   -Furosemide held   -serial CMPs     #Lung nodule on CT Chest   -Right hilar lymph node enlargement   -Pulm follow up outpatient     #Depression Anxiety   -Sertraline 50mg daily   -Quetiapine 25mg bedtime     DVT ppx Eliquis 5mg BID   Dispo: Active withdrawal, pending PT eval

## 2024-11-04 NOTE — CHART NOTE - NSCHARTNOTEFT_GEN_A_CORE
Consult cancelled- clarified by Dr. Sims case coordination with Dr. Rebollar indicates no need for acute psychiatric intervention at this time.  Please reconsult as needed. Consult cancelled- per primary team, no need for acute psychiatric intervention at this time.  Please reconsult as needed.

## 2024-11-04 NOTE — PHYSICAL THERAPY INITIAL EVALUATION ADULT - PERTINENT HX OF CURRENT PROBLEM, REHAB EVAL
Pt presented on 10/31, pt stated he was drinking and went to the city, called an ambulance as he had left shoulder pain and was short of breath.

## 2024-11-04 NOTE — PHYSICAL THERAPY INITIAL EVALUATION ADULT - ADDITIONAL COMMENTS
as per pt, lives on 1st floor of an apartment building with 1STE, has a roommate (unable to assist), does not have family/friends nearby, independent prior, no DME

## 2024-11-04 NOTE — PROGRESS NOTE ADULT - SUBJECTIVE AND OBJECTIVE BOX
HPI  Patient is a 64y Male with PMHx HFpEF, Afib, HTN, and alcohol abuse admitted for AHRF secondary to HFpEF exacerbation.      INTERVAL EVENTS  Patient required 10mg Valium overnight for CIWA score of 9, with points added for anxiety. Patient reporting left shoulder pain, due to trauma previous to hospital admission. Work up with Xray of joint.     HOSPITAL COURSE  Pt BIBEMS to the ED on 10/30 due to heart failure regimen noncompliance and ADHF with AHRF, requiring 02 supplementation in the ambulance. MICU was consulted on 10/31 for alcohol withdrawal symptoms including diaphoresis, agitation, and hypertensive emergency. He was placed on precedex sedation + valium taper.  TTE done on 10/31 showing LVEF 52% + LVH + dilated Left and Right atria + MR + TR. Patient downgraded to medial floors on 11/02, while on 2LNC, with BP managed with Hydralazine, amlodipine, clonidine, Beta blocker.     REVIEW OF SYSTEMS Patient denies SOB, on room air. Patient denies chest pain.  General: No fatigue, decreased apatite, weight loss  HEENT: No cough, throat pain, rhinorrhea hemoptysis    Chest: No shortness of breath, chest pain  Abdomen: No abdominal pain, hematemesis   Genitourinary: No dysuria, No hematuria   Extremities: No joint pain, no change in range of motion  Skin: No rashes, ecchymoses purpura, nodules       MEDICATIONS  MEDICATIONS  (STANDING):  amLODIPine   Tablet 10 milliGRAM(s) Oral daily  apixaban 5 milliGRAM(s) Oral every 12 hours  atorvastatin 20 milliGRAM(s) Oral at bedtime  chlorhexidine 2% Cloths 1 Application(s) Topical <User Schedule>  cloNIDine 0.1 milliGRAM(s) Oral every 8 hours  diazepam  Injectable 10 milliGRAM(s) IV Push every 12 hours  folic acid 1 milliGRAM(s) Oral daily  hydrALAZINE 50 milliGRAM(s) Oral every 8 hours  multivitamin 1 Tablet(s) Oral daily  nicotine - 21 mG/24Hr(s) Patch 21 Patch Transdermal daily  nystatin Powder 1 Application(s) Topical two times a day  QUEtiapine 25 milliGRAM(s) Oral at bedtime  sertraline 50 milliGRAM(s) Oral daily  thiamine IVPB 500 milliGRAM(s) IV Intermittent every 8 hours    MEDICATIONS  (PRN):  acetaminophen     Tablet .. 650 milliGRAM(s) Oral every 6 hours PRN Temp greater or equal to 38C (100.4F), Mild Pain (1 - 3)  aluminum hydroxide/magnesium hydroxide/simethicone Suspension 30 milliLiter(s) Oral every 4 hours PRN Dyspepsia  melatonin 3 milliGRAM(s) Oral at bedtime PRN Insomnia  ondansetron Injectable 4 milliGRAM(s) IV Push every 8 hours PRN Nausea and/or Vomiting      ALLERGIES  Allergies    No Known Allergies    Intolerances        PHYSICAL EXAM   Vital Signs Last 24 Hrs  T(C): 36.9 (04 Nov 2024 09:00), Max: 37.2 (03 Nov 2024 13:00)  T(F): 98.5 (04 Nov 2024 09:00), Max: 99 (03 Nov 2024 17:00)  HR: 75 (04 Nov 2024 09:00) (68 - 82)  BP: 157/80 (04 Nov 2024 09:00) (114/73 - 185/90)  BP(mean): --  RR: 18 (04 Nov 2024 09:00) (18 - 20)  SpO2: 92% (04 Nov 2024 09:00) (91% - 94%)    Parameters below as of 04 Nov 2024 09:00  Patient On (Oxygen Delivery Method): room air        T(C): 36.9 (11-04-24 @ 09:00), Max: 37.2 (11-03-24 @ 13:00)  HR: 75 (11-04-24 @ 09:00) (68 - 82)  BP: 157/80 (11-04-24 @ 09:00) (114/73 - 185/90)  RR: 18 (11-04-24 @ 09:00) (18 - 20)  SpO2: 92% (11-04-24 @ 09:00) (91% - 94%)    CONSTITUTIONAL: Well groomed, no apparent distress, lying in bed comfortably   EYES: bilateral eye conjunctivitis   ENMT: Oral mucosa with moist membranes. Normal dentition, dried yellow paste at corner of mouth and beard.   NECK: without tracheal deviation   RESP: No respiratory distress, no use of accessory muscles; CTA b/l, no WRR  CV: RRR, +S1S2, no MRG; no JVD; no peripheral edema  GI: Soft, NT, ND, no rebound, no guarding; bowel sounds present   MSK: Left upper arm pain present with palpation and active ROM overhead   SKIN: No rashes or ulcers noted  NEURO: sensation intact in upper and lower extremities b/l to light touch   PSYCH: Appropriate insight/judgment; A+O x 4, mood and affect appropriate, recent/remote memory intact      LABS                        13.8   8.61  )-----------( 240      ( 03 Nov 2024 05:12 )             42.7       CBC Full  -  ( 03 Nov 2024 05:12 )  WBC Count : 8.61 K/uL  RBC Count : 4.93 M/uL  Hemoglobin : 13.8 g/dL  Hematocrit : 42.7 %  Platelet Count - Automated : 240 K/uL  Mean Cell Volume : 86.6 fl  Mean Cell Hemoglobin : 28.0 pg  Mean Cell Hemoglobin Concentration : 32.3 g/dL  Auto Neutrophil # : 7.22 K/uL  Auto Lymphocyte # : 0.23 K/uL  Auto Monocyte # : 0.69 K/uL  Auto Eosinophil # : 0.31 K/uL  Auto Basophil # : 0.00 K/uL  Auto Neutrophil % : 83.9 %  Auto Lymphocyte % : 2.7 %  Auto Monocyte % : 8.0 %  Auto Eosinophil % : 3.6 %  Auto Basophil % : 0.0 %      11-04    140  |  101  |  38.8[H]  ----------------------------<  113[H]  3.5   |  26.0  |  1.74[H]    Ca    8.8      04 Nov 2024 04:40  Phos  4.2     11-03  Mg     2.2     11-03    TPro  6.9  /  Alb  3.5  /  TBili  0.5  /  DBili  x   /  AST  12  /  ALT  6   /  AlkPhos  62  11-03      CAPILLARY BLOOD GLUCOSE              Urinalysis Basic - ( 04 Nov 2024 04:40 )    Color: x / Appearance: x / SG: x / pH: x  Gluc: 113 mg/dL / Ketone: x  / Bili: x / Urobili: x   Blood: x / Protein: x / Nitrite: x   Leuk Esterase: x / RBC: x / WBC x   Sq Epi: x / Non Sq Epi: x / Bacteria: x        IMAGING       < from: CT Chest No Cont (11.02.24 @ 14:24) >  ACC: [id number] EXAM:  CT CHEST      PROCEDURE DATE:  11/02/2024          INTERPRETATION:  INDICATION: Alcohol abuse, left pleural effusion    TECHNIQUE: Helical acquisition images of the chest without intravenous   contrast. Maximum intensity projection images were generated.    COMPARISON: CT chest 6/16/2021.    FINDINGS:    LUNGS/AIRWAYS/PLEURA: Small volume secretions in the trachea and left   main bronchus. Biapical emphysema. Unchanged few sub-5 mm nodules in the   right lung. Small left and trace right pleural effusions. Passive   atelectasis in the lower lobes.    LYMPH NODES/MEDIASTINUM: Increased size of 1.5 cm right hilar lymph node   from 1.1 cm. Otherwise unchanged bilateral mediastinal and hilar   lymphadenopathy.    HEART/VASCULATURE: Enlarged heart. No pericardial effusion. Coronary   artery calcification. 3.8 cm ascending aorta.    UPPER ABDOMEN: Left renal cyst. Decrease partially included   lymphadenopathy.    BONES/SOFT TISSUES: Unremarkable.      IMPRESSION:    Small left and trace right pleural effusions.    Mildly increased right hilar lymph node. Otherwise unchanged nonspecific   intrathoracic lymphadenopathy and decreased upper abdominal   lymphadenopathy.    --- End of Report ---    < end of copied text >  < from: CT Head No Cont (11.02.24 @ 14:23) >  ACC: [id number] EXAM:  CT BRAIN    PROCEDURE DATE:  11/02/2024          INTERPRETATION:  CLINICAL INFORMATION: Alteration of consciousness.    COMPARISON: 09/27/2004.    CONTRAST:  IV Contrast: NONE  Complications: None reported at time of study completion    TECHNIQUE:  Serial axial images were obtained from the skull base to the   vertex using multi-slice helical technique. Sagittal and coronal   reformats were obtained.    FINDINGS:    VENTRICLES AND SULCI: Age appropriate involutional changes.  INTRA-AXIAL: No mass effect, acute hemorrhage, or midline shift.  Severe   confluent hypodensity and periventricular, deep and subcortical white   matter both cerebral hemispheres is stable from 09/27/2024.  Chronic   lacunar infarcts in the right caudate nucleus, anterior limbs of the   internal capsules bilaterally, subinsular white matter bilaterally, and   thalami bilaterally are stable from 09/27/2024.  A chronic lacunar   infarct versus dilated perivascular space in the ventral right basal   ganglionic region is stable from 09/27/2024.  EXTRA-AXIAL: No acute subarachnoid or subdural hemorrhage.    VISUALIZED SINUSES:  Clear.  TYMPANOMASTOID CAVITIES:  Clear.  VISUALIZED ORBITS: Normal.  CALVARIUM: Intact.    MISCELLANEOUS: None.      IMPRESSION:  No CT evidence of acute intracranial pathology.    Severe confluent white matter hypodensity in both cerebral hemispheres is   stable from 09/27/2024.  The findings may represent severe microvascular   type changes however other etiologies of white matter leukoencephalopathy   are not excluded.    Chronic lacunar infarcts in the basal ganglionic regions, internal   capsules, subinsular white matter and thalami are stable from 09/27/2024.    Follow-up MRI may be obtained for further evaluation.        --- End of Report ---    < end of copied text >

## 2024-11-04 NOTE — GOALS OF CARE CONVERSATION - ADVANCED CARE PLANNING - CONVERSATION DETAILS
Patient was asked how he would want the health care team to proceed in the event of cardiac arrest. Patient AOX4 and understanding of the consequences of his decisions. Patient reports that he would not CPR to be initiated in the event of cardiac arrest and also denied intubation in the event of inability to breathe on his own. Patient also reports that he would not be interested in NG tube placement of PEG tube placement in the event he is no longer able to tolerate oral feeding.

## 2024-11-04 NOTE — PHYSICAL THERAPY INITIAL EVALUATION ADULT - BED MOBILITY LIMITATIONS, REHAB EVAL
instructed pt to not use LUE/decreased ability to use arms for pushing/pulling/impaired ability to control trunk for mobility

## 2024-11-05 ENCOUNTER — TRANSCRIPTION ENCOUNTER (OUTPATIENT)
Age: 64
End: 2024-11-05

## 2024-11-05 LAB
ALBUMIN SERPL ELPH-MCNC: 3.3 G/DL — SIGNIFICANT CHANGE UP (ref 3.3–5.2)
ALP SERPL-CCNC: 58 U/L — SIGNIFICANT CHANGE UP (ref 40–120)
ALT FLD-CCNC: 12 U/L — SIGNIFICANT CHANGE UP
ANION GAP SERPL CALC-SCNC: 12 MMOL/L — SIGNIFICANT CHANGE UP (ref 5–17)
AST SERPL-CCNC: 14 U/L — SIGNIFICANT CHANGE UP
BASOPHILS # BLD AUTO: 0.05 K/UL — SIGNIFICANT CHANGE UP (ref 0–0.2)
BASOPHILS NFR BLD AUTO: 0.6 % — SIGNIFICANT CHANGE UP (ref 0–2)
BILIRUB SERPL-MCNC: 0.5 MG/DL — SIGNIFICANT CHANGE UP (ref 0.4–2)
BUN SERPL-MCNC: 35.1 MG/DL — HIGH (ref 8–20)
CALCIUM SERPL-MCNC: 8.9 MG/DL — SIGNIFICANT CHANGE UP (ref 8.4–10.5)
CHLORIDE SERPL-SCNC: 101 MMOL/L — SIGNIFICANT CHANGE UP (ref 96–108)
CO2 SERPL-SCNC: 27 MMOL/L — SIGNIFICANT CHANGE UP (ref 22–29)
CREAT SERPL-MCNC: 1.45 MG/DL — HIGH (ref 0.5–1.3)
EGFR: 53 ML/MIN/1.73M2 — LOW
EOSINOPHIL # BLD AUTO: 0.26 K/UL — SIGNIFICANT CHANGE UP (ref 0–0.5)
EOSINOPHIL NFR BLD AUTO: 3 % — SIGNIFICANT CHANGE UP (ref 0–6)
GLUCOSE SERPL-MCNC: 114 MG/DL — HIGH (ref 70–99)
HCT VFR BLD CALC: 44.1 % — SIGNIFICANT CHANGE UP (ref 39–50)
HGB BLD-MCNC: 13.9 G/DL — SIGNIFICANT CHANGE UP (ref 13–17)
IMM GRANULOCYTES NFR BLD AUTO: 0.7 % — SIGNIFICANT CHANGE UP (ref 0–0.9)
LYMPHOCYTES # BLD AUTO: 0.68 K/UL — LOW (ref 1–3.3)
LYMPHOCYTES # BLD AUTO: 7.9 % — LOW (ref 13–44)
MAGNESIUM SERPL-MCNC: 2.4 MG/DL — SIGNIFICANT CHANGE UP (ref 1.6–2.6)
MCHC RBC-ENTMCNC: 26.9 PG — LOW (ref 27–34)
MCHC RBC-ENTMCNC: 31.5 G/DL — LOW (ref 32–36)
MCV RBC AUTO: 85.5 FL — SIGNIFICANT CHANGE UP (ref 80–100)
MONOCYTES # BLD AUTO: 1.15 K/UL — HIGH (ref 0–0.9)
MONOCYTES NFR BLD AUTO: 13.3 % — SIGNIFICANT CHANGE UP (ref 2–14)
NEUTROPHILS # BLD AUTO: 6.43 K/UL — SIGNIFICANT CHANGE UP (ref 1.8–7.4)
NEUTROPHILS NFR BLD AUTO: 74.5 % — SIGNIFICANT CHANGE UP (ref 43–77)
PHOSPHATE SERPL-MCNC: 3.9 MG/DL — SIGNIFICANT CHANGE UP (ref 2.4–4.7)
PLATELET # BLD AUTO: 272 K/UL — SIGNIFICANT CHANGE UP (ref 150–400)
POTASSIUM SERPL-MCNC: 3.7 MMOL/L — SIGNIFICANT CHANGE UP (ref 3.5–5.3)
POTASSIUM SERPL-SCNC: 3.7 MMOL/L — SIGNIFICANT CHANGE UP (ref 3.5–5.3)
PROT SERPL-MCNC: 6.7 G/DL — SIGNIFICANT CHANGE UP (ref 6.6–8.7)
RBC # BLD: 5.16 M/UL — SIGNIFICANT CHANGE UP (ref 4.2–5.8)
RBC # FLD: 13.5 % — SIGNIFICANT CHANGE UP (ref 10.3–14.5)
SODIUM SERPL-SCNC: 140 MMOL/L — SIGNIFICANT CHANGE UP (ref 135–145)
WBC # BLD: 8.63 K/UL — SIGNIFICANT CHANGE UP (ref 3.8–10.5)
WBC # FLD AUTO: 8.63 K/UL — SIGNIFICANT CHANGE UP (ref 3.8–10.5)

## 2024-11-05 PROCEDURE — 99233 SBSQ HOSP IP/OBS HIGH 50: CPT | Mod: GC

## 2024-11-05 RX ORDER — B-COMPLEX WITH VITAMIN C
1 VIAL (ML) INJECTION
Qty: 0 | Refills: 0 | DISCHARGE
Start: 2024-11-05

## 2024-11-05 RX ORDER — POLYETHYLENE GLYCOL 3350 17 G/17G
17 POWDER, FOR SOLUTION ORAL ONCE
Refills: 0 | Status: COMPLETED | OUTPATIENT
Start: 2024-11-05 | End: 2024-11-06

## 2024-11-05 RX ORDER — HYDRALAZINE HYDROCHLORIDE 50 MG/1
1 TABLET, FILM COATED ORAL
Qty: 0 | Refills: 0 | DISCHARGE
Start: 2024-11-05

## 2024-11-05 RX ORDER — FOLIC ACID 1 MG/1
1 TABLET ORAL
Qty: 0 | Refills: 0 | DISCHARGE
Start: 2024-11-05

## 2024-11-05 RX ORDER — HYDRALAZINE HYDROCHLORIDE 50 MG/1
1 TABLET, FILM COATED ORAL
Refills: 0 | DISCHARGE

## 2024-11-05 RX ORDER — ACETAMINOPHEN 500 MG
2 TABLET ORAL
Qty: 0 | Refills: 0 | DISCHARGE
Start: 2024-11-05

## 2024-11-05 RX ORDER — NICOTINE POLACRILEX 4 MG/1
1 GUM, CHEWING ORAL
Qty: 0 | Refills: 0 | DISCHARGE
Start: 2024-11-05

## 2024-11-05 RX ADMIN — APIXABAN 5 MILLIGRAM(S): 5 TABLET, FILM COATED ORAL at 18:11

## 2024-11-05 RX ADMIN — NICOTINE POLACRILEX 21 PATCH: 4 GUM, CHEWING ORAL at 19:00

## 2024-11-05 RX ADMIN — Medication 20 MILLIGRAM(S): at 22:20

## 2024-11-05 RX ADMIN — Medication 1 TABLET(S): at 13:47

## 2024-11-05 RX ADMIN — CHLORHEXIDINE GLUCONATE 1 APPLICATION(S): 40 SOLUTION TOPICAL at 05:39

## 2024-11-05 RX ADMIN — Medication 650 MILLIGRAM(S): at 18:12

## 2024-11-05 RX ADMIN — HYDRALAZINE HYDROCHLORIDE 50 MILLIGRAM(S): 50 TABLET, FILM COATED ORAL at 22:20

## 2024-11-05 RX ADMIN — NICOTINE POLACRILEX 21 PATCH: 4 GUM, CHEWING ORAL at 13:48

## 2024-11-05 RX ADMIN — QUETIAPINE FUMARATE 25 MILLIGRAM(S): 200 TABLET ORAL at 22:20

## 2024-11-05 RX ADMIN — HYDRALAZINE HYDROCHLORIDE 50 MILLIGRAM(S): 50 TABLET, FILM COATED ORAL at 13:47

## 2024-11-05 RX ADMIN — HYDRALAZINE HYDROCHLORIDE 50 MILLIGRAM(S): 50 TABLET, FILM COATED ORAL at 05:39

## 2024-11-05 RX ADMIN — FOLIC ACID 1 MILLIGRAM(S): 1 TABLET ORAL at 13:48

## 2024-11-05 RX ADMIN — Medication 10 MILLIGRAM(S): at 05:39

## 2024-11-05 RX ADMIN — APIXABAN 5 MILLIGRAM(S): 5 TABLET, FILM COATED ORAL at 05:39

## 2024-11-05 RX ADMIN — NYSTATIN 1 APPLICATION(S): 100000 POWDER TOPICAL at 18:11

## 2024-11-05 RX ADMIN — NYSTATIN 1 APPLICATION(S): 100000 POWDER TOPICAL at 05:41

## 2024-11-05 RX ADMIN — SERTRALINE HYDROCHLORIDE 50 MILLIGRAM(S): 50 TABLET, FILM COATED ORAL at 13:48

## 2024-11-05 RX ADMIN — CLONIDINE HYDROCHLORIDE 0.1 MILLIGRAM(S): 0.2 TABLET ORAL at 05:39

## 2024-11-05 RX ADMIN — DIAZEPAM 10 MILLIGRAM(S): 10 FILM BUCCAL at 05:40

## 2024-11-05 RX ADMIN — CLONIDINE HYDROCHLORIDE 0.1 MILLIGRAM(S): 0.2 TABLET ORAL at 22:20

## 2024-11-05 RX ADMIN — NICOTINE POLACRILEX 1 PATCH: 4 GUM, CHEWING ORAL at 13:49

## 2024-11-05 RX ADMIN — CLONIDINE HYDROCHLORIDE 0.1 MILLIGRAM(S): 0.2 TABLET ORAL at 13:48

## 2024-11-05 NOTE — DISCHARGE NOTE PROVIDER - NSDCFUSCHEDAPPT_GEN_ALL_CORE_FT
Luis Antonio Chu Physician [facility]  CARDIOLOGY [address]  Scheduled Appointment: 11/19/2024

## 2024-11-05 NOTE — DISCHARGE NOTE PROVIDER - HOSPITAL COURSE
Pt BIBEMS to the ED on 10/30 due to heart failure regimen noncompliance and ADHF with AHRF, requiring 02 supplementation in the ambulance. MICU was consulted on 10/31 for alcohol withdrawal symptoms including diaphoresis, agitation, and hypertensive emergency. He was placed on precedex sedation + valium taper.  TTE done on 10/31 showing LVEF 52% + LVH + dilated Left and Right atria + MR + TR. Patient downgraded to medial floors on 11/02, while on 2LNC, with BP managed with Hydralazine, amlodipine, clonidine, Beta blocker. Pt BIBEMS to the ED on 10/30 due to heart failure regimen noncompliance and ADHF with AHRF, requiring 02 supplementation in the ambulance. MICU was consulted on 10/31 for alcohol withdrawal symptoms including diaphoresis, agitation, and hypertensive emergency. He was placed on precedex sedation + valium taper.  TTE done on 10/31 showing LVEF 52% + LVH + dilated Left and Right atria + MR + TR. Patient downgraded to medial floors on 11/02, while on 2LNC, with BP managed with Hydralazine, amlodipine, clonidine, Beta blocker. Patient required 10mg Valium overnight for CIWA score of 9, with points added for anxiety. Patient reporting left shoulder pain, due to trauma previous to hospital admission. Work up with Xray of joint on 11/04. Cardiology consult placed for HFpEF diagnosis 11/05. Pt BIBEMS to the ED on 10/30 due to heart failure regimen noncompliance and ADHF with AHRF, requiring 02 supplementation in the ambulance. MICU was consulted on 10/31 for alcohol withdrawal symptoms including diaphoresis, agitation, and hypertensive emergency. He was placed on precedex sedation + valium taper.  TTE done on 10/31 showing LVEF 52% + LVH + dilated Left and Right atria + MR + TR. Patient downgraded to medial floors on 11/02, while on 2LNC, with BP managed with Hydralazine, amlodipine, clonidine, Beta blocker. Patient reporting left shoulder pain, due to trauma previous to hospital admission. Work up with Xray of joint on 11/04. Cardiology consult placed for HFpEF diagnosis 11/05.

## 2024-11-05 NOTE — CHART NOTE - NSCHARTNOTEFT_GEN_A_CORE
Nutrition Note:   Pt noted to be a STAR pt and now on 5twr.  Met with pt who reports 8# intentional weight loss.  Provided STAR heart failure education packet with instruction.   Spoke about fluid restriction, monitoring weights and following low sodium diet.   Pt reports he  follows diet at home.    Encouraged diet compliance.  Monitor po intake, labs  Continue folic acid, MVI  Rec Thiamine    RD to follow up as needed.

## 2024-11-05 NOTE — DISCHARGE NOTE NURSING/CASE MANAGEMENT/SOCIAL WORK - PATIENT PORTAL LINK FT
You can access the FollowMyHealth Patient Portal offered by [facility] by registering at the following website: [email or web address]. By joining [facility]’s FollowMyHealth portal, you will also be able to view your health information using other applications (apps) compatible with our system.

## 2024-11-05 NOTE — DISCHARGE NOTE NURSING/CASE MANAGEMENT/SOCIAL WORK - FINANCIAL ASSISTANCE
[facility] provides services at a reduced cost to those who are determined to be eligible through [facility]’s financial assistance program. Information regarding [facility]’s financial assistance program can be found by going to [email or web address] or by calling [phone].

## 2024-11-05 NOTE — PROGRESS NOTE ADULT - SUBJECTIVE AND OBJECTIVE BOX
HPI  Patient is a 64y Male with PMHx HFpEF, Afib, HTN, and alcohol abuse admitted for AHRF secondary to HFpEF exacerbation.      INTERVAL EVENTS  Cardiology consulted for history of HFpEF. CIWA scoring 2.     HOSPITAL COURSE  Pt BIBEMS to the ED on 10/30 due to heart failure regimen noncompliance and ADHF with AHRF, requiring 02 supplementation in the ambulance. MICU was consulted on 10/31 for alcohol withdrawal symptoms including diaphoresis, agitation, and hypertensive emergency. He was placed on precedex sedation + valium taper.  TTE done on 10/31 showing LVEF 52% + LVH + dilated Left and Right atria + MR + TR. Patient downgraded to medial floors on 11/02, while on 2LNC, with BP managed with Hydralazine, amlodipine, clonidine, Beta blocker. Patient required 10mg Valium overnight for CIWA score of 9, with points added for anxiety. Patient reporting left shoulder pain, due to trauma previous to hospital admission. Work up with Xray of joint.       REVIEW OF SYSTEMS Patient denies chest pain, SOB, diaphoresis.   General: No fatigue, decreased apatite, weight loss  HEENT: No cough, throat pain, rhinorrhea hemoptysis    Chest: No shortness of breath, chest pain  Abdomen: No abdominal pain, hematemesis   Genitourinary: No dysuria, No hematuria   Extremities: No joint pain, no change in range of motion  Skin: No rashes, ecchymoses purpura, nodules       MEDICATIONS  MEDICATIONS  (STANDING):  amLODIPine   Tablet 10 milliGRAM(s) Oral daily  apixaban 5 milliGRAM(s) Oral every 12 hours  atorvastatin 20 milliGRAM(s) Oral at bedtime  chlorhexidine 2% Cloths 1 Application(s) Topical <User Schedule>  cloNIDine 0.1 milliGRAM(s) Oral every 8 hours  folic acid 1 milliGRAM(s) Oral daily  hydrALAZINE 50 milliGRAM(s) Oral every 8 hours  multivitamin 1 Tablet(s) Oral daily  nicotine - 21 mG/24Hr(s) Patch 21 Patch Transdermal daily  nystatin Powder 1 Application(s) Topical two times a day  QUEtiapine 25 milliGRAM(s) Oral at bedtime  sertraline 50 milliGRAM(s) Oral daily    MEDICATIONS  (PRN):  acetaminophen     Tablet .. 650 milliGRAM(s) Oral every 6 hours PRN Temp greater or equal to 38C (100.4F), Mild Pain (1 - 3)  aluminum hydroxide/magnesium hydroxide/simethicone Suspension 30 milliLiter(s) Oral every 4 hours PRN Dyspepsia  diazepam  Injectable 10 milliGRAM(s) IV Push every 2 hours PRN CIWA > 8  melatonin 3 milliGRAM(s) Oral at bedtime PRN Insomnia  ondansetron Injectable 4 milliGRAM(s) IV Push every 8 hours PRN Nausea and/or Vomiting      ALLERGIES  Allergies    No Known Allergies    Intolerances        PHYSICAL EXAM   Vital Signs Last 24 Hrs  T(C): 36.7 (05 Nov 2024 08:14), Max: 37.4 (04 Nov 2024 22:13)  T(F): 98.1 (05 Nov 2024 08:14), Max: 99.3 (04 Nov 2024 22:13)  HR: 82 (05 Nov 2024 08:14) (74 - 85)  BP: 170/98 (05 Nov 2024 08:14) (156/101 - 170/98)  BP(mean): 121 (05 Nov 2024 05:50) (121 - 121)  RR: 18 (05 Nov 2024 08:14) (18 - 18)  SpO2: 95% (05 Nov 2024 08:14) (92% - 95%)    Parameters below as of 05 Nov 2024 08:14  Patient On (Oxygen Delivery Method): room air        T(C): 36.7 (11-05-24 @ 08:14), Max: 37.4 (11-04-24 @ 22:13)  HR: 82 (11-05-24 @ 08:14) (74 - 85)  BP: 170/98 (11-05-24 @ 08:14) (156/101 - 170/98)  RR: 18 (11-05-24 @ 08:14) (18 - 18)  SpO2: 95% (11-05-24 @ 08:14) (92% - 95%)      CONSTITUTIONAL: Well groomed, no apparent distress, lying in bed comfortably   EYES: bilateral eye conjunctivitis   ENMT: Oral mucosa with moist membranes. Normal dentition, dried yellow paste at corner of mouth and beard.   NECK: without tracheal deviation   RESP: No respiratory distress, no use of accessory muscles; CTA b/l, no WRR  CV: RRR, +S1S2, no MRG; no JVD; no peripheral edema  GI: Soft, NT, ND, no rebound, no guarding; bowel sounds present   MSK: Left upper arm pain present with palpation and active ROM overhead   SKIN: No rashes or ulcers noted  NEURO: sensation intact in upper and lower extremities b/l to light touch, negative for asterixis or tremor of the right arm.   PSYCH: Appropriate insight/judgment; A+O x 4, mood and affect appropriate, recent/remote memory intact      LABS                        13.9   8.63  )-----------( 272      ( 05 Nov 2024 04:54 )             44.1       CBC Full  -  ( 05 Nov 2024 04:54 )  WBC Count : 8.63 K/uL  RBC Count : 5.16 M/uL  Hemoglobin : 13.9 g/dL  Hematocrit : 44.1 %  Platelet Count - Automated : 272 K/uL  Mean Cell Volume : 85.5 fl  Mean Cell Hemoglobin : 26.9 pg  Mean Cell Hemoglobin Concentration : 31.5 g/dL  Auto Neutrophil # : 6.43 K/uL  Auto Lymphocyte # : 0.68 K/uL  Auto Monocyte # : 1.15 K/uL  Auto Eosinophil # : 0.26 K/uL  Auto Basophil # : 0.05 K/uL  Auto Neutrophil % : 74.5 %  Auto Lymphocyte % : 7.9 %  Auto Monocyte % : 13.3 %  Auto Eosinophil % : 3.0 %  Auto Basophil % : 0.6 %      11-05    140  |  101  |  35.1[H]  ----------------------------<  114[H]  3.7   |  27.0  |  1.45[H]    Ca    8.9      05 Nov 2024 04:54  Phos  3.9     11-05  Mg     2.4     11-05    TPro  6.7  /  Alb  3.3  /  TBili  0.5  /  DBili  x   /  AST  14  /  ALT  12  /  AlkPhos  58  11-05      CAPILLARY BLOOD GLUCOSE              Urinalysis Basic - ( 05 Nov 2024 04:54 )    Color: x / Appearance: x / SG: x / pH: x  Gluc: 114 mg/dL / Ketone: x  / Bili: x / Urobili: x   Blood: x / Protein: x / Nitrite: x   Leuk Esterase: x / RBC: x / WBC x   Sq Epi: x / Non Sq Epi: x / Bacteria: x        IMAGING   < from: Xray Shoulder 2 Views, Left (11.04.24 @ 13:13) >    ACC: [id number] EXAM:  XR SHOULDER COMP MIN 2V LT   PROCEDURE DATE:  11/04/2024          INTERPRETATION:  XR SHOULDER COMPLETE 2 VIEWS LEFT    Clinical History: Pain    Left shoulder 3 views      FINDINGS:    There is no fracture, dislocation, soft tissue swelling or joint effusion.  Mild narrowing of the AC joint.  Coarse calcifications adjacent to the humeral head may be related to   prior trauma, tendinitis or bursitis.  No radiopaque foreign body.    IMPRESSION:    No fracture.  Mild narrowing of the AC joint.  Coarse calcifications adjacent to the humeral head may be related to   prior trauma, tendinitis or bursitis.    --- End of Report ---    < end of copied text >

## 2024-11-05 NOTE — DISCHARGE NOTE PROVIDER - NSDCMRMEDTOKEN_GEN_ALL_CORE_FT
amLODIPine 10 mg oral tablet: 1 tab(s) orally once a day  atorvastatin 20 mg oral tablet: 1 tab(s) orally once a day  clonazePAM 0.5 mg oral tablet: 1 tab(s) orally 2 times a day MDD:2  cloNIDine 0.1 mg oral tablet: 1 tab(s) orally 2 times a day  Eliquis 5 mg oral tablet: 1 tab(s) orally 2 times a day   hydrALAZINE 25 mg oral tablet: 1 tab(s) orally 3 times a day  hydrOXYzine hydrochloride 50 mg oral tablet: 1 tab(s) orally once a day (at bedtime)  lisinopril 40 mg oral tablet: 1 tab(s) orally once a day  metoprolol tartrate 50 mg oral tablet: 1 tab(s) orally 2 times a day  sertraline 50 mg oral tablet: 1 tab(s) orally once a day   acetaminophen 325 mg oral tablet: 2 tab(s) orally every 6 hours As needed Temp greater or equal to 38C (100.4F), Mild Pain (1 - 3)  amLODIPine 10 mg oral tablet: 1 tab(s) orally once a day  atorvastatin 20 mg oral tablet: 1 tab(s) orally once a day  clonazePAM 0.5 mg oral tablet: 1 tab(s) orally 2 times a day MDD:2  cloNIDine 0.1 mg oral tablet: 1 tab(s) orally 2 times a day  Eliquis 5 mg oral tablet: 1 tab(s) orally 2 times a day   folic acid 1 mg oral tablet: 1 tab(s) orally once a day  hydrALAZINE 50 mg oral tablet: 1 tab(s) orally every 8 hours  hydrOXYzine hydrochloride 50 mg oral tablet: 1 tab(s) orally once a day (at bedtime)  lisinopril 40 mg oral tablet: 1 tab(s) orally once a day  metoprolol tartrate 50 mg oral tablet: 1 tab(s) orally 2 times a day  Multiple Vitamins oral tablet: 1 tab(s) orally once a day  nicotine 21 mg/24 hr transdermal film, extended release: 1 patch transdermal once a day  sertraline 50 mg oral tablet: 1 tab(s) orally once a day

## 2024-11-05 NOTE — DISCHARGE NOTE NURSING/CASE MANAGEMENT/SOCIAL WORK - NSDCPEFALRISK_GEN_ALL_CORE
For information on Fall & Injury Prevention, visit: [email or web address] OR  [email or web address] OR  https://www.cdc.gov/steadi/patient.html

## 2024-11-05 NOTE — DISCHARGE NOTE PROVIDER - NSDCCPCAREPLAN_GEN_ALL_CORE_FT
PRINCIPAL DISCHARGE DIAGNOSIS  Diagnosis: Shortness of breath  Assessment and Plan of Treatment: You came with difficulty breathing, most likely due to an exaserbation of your previous heart failure diagnosis. We continued to control your blood pressure with hydralazine + amlodipine.      SECONDARY DISCHARGE DIAGNOSES  Diagnosis: Alcohol dependence with withdrawal  Assessment and Plan of Treatment: During your hospital stay, you were transferred to the ICU because of extremely high blood pressures likely caused by your recent pause in alcohol consumption. When you stop drinking alcohol abruptly, it can throw your body into overdrive with high blood pressures + sweating + tremors. In the ICU, the team was able to get your blood pressure under control and you were placed on benzos to keep your withdrawal symptoms under control.     PRINCIPAL DISCHARGE DIAGNOSIS  Diagnosis: Shortness of breath  Assessment and Plan of Treatment: You came with difficulty breathing, most likely due to an exaserbation of your previous heart failure diagnosis. We continued to control your blood pressure with hydralazine + amlodipine.      SECONDARY DISCHARGE DIAGNOSES  Diagnosis: Alcohol dependence with withdrawal  Assessment and Plan of Treatment: During your hospital stay, you were transferred to the ICU because of extremely high blood pressures likely caused by your recent pause in alcohol consumption. When you stop drinking alcohol abruptly, it can throw your body into overdrive with high blood pressures + sweating + tremors. In the ICU, the team was able to get your blood pressure under control and you were placed on benzos to keep your withdrawal symptoms under control.    Diagnosis: HTN (hypertension)  Assessment and Plan of Treatment:

## 2024-11-05 NOTE — DISCHARGE NOTE NURSING/CASE MANAGEMENT/SOCIAL WORK - NSDCFUADDAPPT_GEN_ALL_CORE_FT
Please see below for post hospital follow up information     1. [facility] Meds To Bed: [phone]    2. Home Care:N/A    3. Transitional Care Services: [phone]    4, A. Primary Care Appointment:N/A    4, B. Specialty Appointment:    5. STAR Education Packet Provided   Please see below for post hospital follow up information     1. [facility] Meds To Bed: [phone]    2. Home Care:N/A    3. Transitional Care Services: [phone]    4, A. Primary Care Appointment:N/A    4, B. Specialty Appointment:Cardio – Appt w/ Dr. Chu on 11/19 at 9:30. If you are unable to attend your pre-scheduled appointment, please contact the office directly at [phone] to reschedule    5. STAR Education Packet Provided

## 2024-11-05 NOTE — PROGRESS NOTE ADULT - ASSESSMENT
Patient is a 64y Male with PMHx HFpEF, Afib, HTN, and alcohol abuse admitted for AHRF secondary to HFpEF exacerbation.      #AHRF secondary to ADHFpEF  -TTE 10/31 showing LVEF 52% + LVH +dilated left and right atria + MR + TR  -currently on room air   -Furosemide paused due to improving overload status   -Cardiology consulted     #Alcohol withdrawal  -CIWA protocol  -Valium 10mg BID   -Valium 10mg q2 prn for CIWA > 8  -Thiamine 500mg TID   -Folic acid 1mg daily   -Multivitamin    #HTN  -Transferred to MICU for hypertensive urgency in the setting of alcohol withdrawal   -Amlodipine 10mg daily   -Clonidine 0.1mg TID   -hydralazine 50mg TID     #Afib   -Eliquis 5mg BID       #CKD   -Baseline Cr 1.2-1.3   -Renal US negative for hydronephrosis   -Furosemide held   -serial CMPs     #Lung nodule on CT Chest   -Right hilar lymph node enlargement   -Pulm follow up outpatient     #Depression Anxiety   -Sertraline 50mg daily   -Quetiapine 25mg bedtime     DVT ppx Eliquis 5mg BID   Dispo: PT valentín recommending PATRICK

## 2024-11-06 ENCOUNTER — EMERGENCY (EMERGENCY)
Facility: HOSPITAL | Age: 64
LOS: 1 days | End: 2024-11-06
Attending: STUDENT IN AN ORGANIZED HEALTH CARE EDUCATION/TRAINING PROGRAM
Payer: MEDICARE

## 2024-11-06 VITALS
OXYGEN SATURATION: 94 % | HEART RATE: 81 BPM | TEMPERATURE: 98 F | SYSTOLIC BLOOD PRESSURE: 172 MMHG | HEIGHT: 67 IN | DIASTOLIC BLOOD PRESSURE: 97 MMHG | RESPIRATION RATE: 18 BRPM

## 2024-11-06 VITALS
TEMPERATURE: 98 F | RESPIRATION RATE: 19 BRPM | HEART RATE: 64 BPM | SYSTOLIC BLOOD PRESSURE: 159 MMHG | OXYGEN SATURATION: 95 % | DIASTOLIC BLOOD PRESSURE: 99 MMHG

## 2024-11-06 PROCEDURE — 99497 ADVNCD CARE PLAN 30 MIN: CPT | Mod: 25

## 2024-11-06 PROCEDURE — 99239 HOSP IP/OBS DSCHRG MGMT >30: CPT

## 2024-11-06 PROCEDURE — 99284 EMERGENCY DEPT VISIT MOD MDM: CPT

## 2024-11-06 PROCEDURE — 99222 1ST HOSP IP/OBS MODERATE 55: CPT

## 2024-11-06 RX ADMIN — HYDRALAZINE HYDROCHLORIDE 50 MILLIGRAM(S): 50 TABLET, FILM COATED ORAL at 06:20

## 2024-11-06 RX ADMIN — CLONIDINE HYDROCHLORIDE 0.1 MILLIGRAM(S): 0.2 TABLET ORAL at 06:20

## 2024-11-06 RX ADMIN — NYSTATIN 1 APPLICATION(S): 100000 POWDER TOPICAL at 06:20

## 2024-11-06 RX ADMIN — APIXABAN 5 MILLIGRAM(S): 5 TABLET, FILM COATED ORAL at 06:20

## 2024-11-06 RX ADMIN — Medication 10 MILLIGRAM(S): at 06:20

## 2024-11-06 RX ADMIN — CHLORHEXIDINE GLUCONATE 1 APPLICATION(S): 40 SOLUTION TOPICAL at 06:19

## 2024-11-06 NOTE — CONSULT NOTE ADULT - CONVERSATION DETAILS
Patient acknowledges his wishes for Full Code now but he does go on to explain that at the end of life he wouldnt want aggressive measures like CPR and intubation but that his sister and him should continue these important conversations if this is how he feels in the event something would happen leaving him in a critical state that would require these interventions   Patient designates his sister Latrice as HCP he is able to identify her as a sister who he cares for and knows him well, he believes she would act in his best interest  HCP form completed with second witness Gabrielle Good LCSW who was at bedside during encounter  See Chart note from palliative  gabrielle for more information on our encounter

## 2024-11-06 NOTE — CONSULT NOTE ADULT - SUBJECTIVE AND OBJECTIVE BOX
Palliative Care Consult  64 year old admitted with HTN emergency/ETOH withdrawal, palliative consulted as patient meetvs STAR criteria for CHF    <HPI:  63 y/o M w/ PMH of etoh abuse, HFpEF, HTN presented c/o SOB for the past few months.  At this time unable to obtain information from pt as he is lethargic.  Pt reported to ED he has had difficulty laying flat, requiring more pillows to sleep and that he is noncompliant w/ home medications.  He also reported drinking 4-6 large beers every day and last drink was last night.   (31 Oct 2024 08:11)>end of copied text      PERTINENT PMH REVIEWED: Yes     PAST MEDICAL & SURGICAL HISTORY:  HTN (hypertension)      CHF (congestive heart failure)      Atrial fibrillation      Depression, unspecified depression type      Pulmonary hypertension      Hyperlipidemia      Pericardial effusion          SOCIAL HISTORY:                      Substance history:yes                    Admitted from:  home  - free group home                     Protestant/spirituality:unknown                    Cultural concerns:none                      Surrogate/HCP/Guardian: Phone#:Latrice Cervantes sister/hcp  [phone]    FAMILY HISTORY:    No family history related to admission diagonsis    Allergies  No Known Allergies    ADVANCE DIRECTIVES/TREATMENT PREFERENCES:  Full Code  SISTER HCP COMPLETED    Baseline ADLs (prior to admission):  Independent    Karnofsky/Palliative Performance Status Version 2:  %60  http://npcrc.org/files/news/palliative_performance_scale_ppsv2.pdf    Present Symptoms:     Dyspnea: no  Nausea/Vomiting: No  Anxiety:  No  Depression: No  Fatigue: No  Loss of appetite: No  Constipation: no    Pain: denies pain            Character-            Duration-            Effect-            Factors-            Frequency-            Location-            Severity-    Pain AD Score:0  [email or web address] (press ctrl + left click to view)    Review of Systems: Reviewed                     Negative: no pain  All others negative    MEDICATIONS  (STANDING):  amLODIPine   Tablet 10 milliGRAM(s) Oral daily  apixaban 5 milliGRAM(s) Oral every 12 hours  atorvastatin 20 milliGRAM(s) Oral at bedtime  chlorhexidine 2% Cloths 1 Application(s) Topical <User Schedule>  cloNIDine 0.1 milliGRAM(s) Oral every 8 hours  folic acid 1 milliGRAM(s) Oral daily  hydrALAZINE 50 milliGRAM(s) Oral every 8 hours  multivitamin 1 Tablet(s) Oral daily  nicotine - 21 mG/24Hr(s) Patch 21 Patch Transdermal daily  nystatin Powder 1 Application(s) Topical two times a day  QUEtiapine 25 milliGRAM(s) Oral at bedtime  sertraline 50 milliGRAM(s) Oral daily    MEDICATIONS  (PRN):  acetaminophen     Tablet .. 650 milliGRAM(s) Oral every 6 hours PRN Temp greater or equal to 38C (100.4F), Mild Pain (1 - 3)  aluminum hydroxide/magnesium hydroxide/simethicone Suspension 30 milliLiter(s) Oral every 4 hours PRN Dyspepsia  diazepam  Injectable 10 milliGRAM(s) IV Push every 2 hours PRN CIWA > 8  melatonin 3 milliGRAM(s) Oral at bedtime PRN Insomnia  ondansetron Injectable 4 milliGRAM(s) IV Push every 8 hours PRN Nausea and/or Vomiting      PHYSICAL EXAM:    Vital Signs Last 24 Hrs  T(C): 36.9 (06 Nov 2024 08:33), Max: 36.9 (05 Nov 2024 19:30)  T(F): 98.5 (06 Nov 2024 08:33), Max: 98.5 (06 Nov 2024 08:33)  HR: 64 (06 Nov 2024 08:33) (64 - 79)  BP: 159/99 (06 Nov 2024 08:33) (124/70 - 176/96)  BP(mean): --  RR: 19 (06 Nov 2024 08:33) (18 - 19)  SpO2: 95% (06 Nov 2024 08:33) (92% - 95%)    Parameters below as of 06 Nov 2024 08:33  Patient On (Oxygen Delivery Method): room air        General: alert      HEENT: normal      Lungs: comfortable     CV: normal      GI: rounded abdomen, no pain    : normal     MSK: normal     Neuro: no focal deficits     Skin: dry skin    LABS:                        13.9   8.63  )-----------( 272      ( 05 Nov 2024 04:54 )             44.1     11-05    140  |  101  |  35.1[H]  ----------------------------<  114[H]  3.7   |  27.0  |  1.45[H]    Ca    8.9      05 Nov 2024 04:54  Phos  3.9     11-05  Mg     2.4     11-05    TPro  6.7  /  Alb  3.3  /  TBili  0.5  /  DBili  x   /  AST  14  /  ALT  12  /  AlkPhos  58  11-05      Urinalysis Basic - ( 05 Nov 2024 04:54 )    Color: x / Appearance: x / SG: x / pH: x  Gluc: 114 mg/dL / Ketone: x  / Bili: x / Urobili: x   Blood: x / Protein: x / Nitrite: x   Leuk Esterase: x / RBC: x / WBC x   Sq Epi: x / Non Sq Epi: x / Bacteria: x      I&O's Summary    05 Nov 2024 07:01  -  06 Nov 2024 07:00  --------------------------------------------------------  IN: 0 mL / OUT: 1000 mL / NET: -1000 mL        RADIOLOGY & ADDITIONAL STUDIES:  CT CHest 11.2.24  IMPRESSION:    Small left and trace right pleural effusions.    Mildly increased right hilar lymph node. Otherwise unchanged nonspecific   intrathoracic lymphadenopathy and decreased upper abdominal   lymphadenopathy.

## 2024-11-06 NOTE — PROGRESS NOTE ADULT - REASON FOR ADMISSION
ETOH withdrawal / HTN emergency

## 2024-11-06 NOTE — PROGRESS NOTE ADULT - SUBJECTIVE AND OBJECTIVE BOX
HPI  Patient is a 64y Male with PMHx HFpEF, Afib, HTN, and alcohol abuse admitted for AHRF secondary to HFpEF exacerbation.      INTERVAL EVENTS  Patient continues to demonstrate drug seeking behavior.     HOSPITAL COURSE  Pt BIBEMS to the ED on 10/30 due to heart failure regimen noncompliance and ADHF with AHRF, requiring 02 supplementation in the ambulance. MICU was consulted on 10/31 for alcohol withdrawal symptoms including diaphoresis, agitation, and hypertensive emergency. He was placed on precedex sedation + valium taper.  TTE done on 10/31 showing LVEF 52% + LVH + dilated Left and Right atria + MR + TR. Patient downgraded to medial floors on 11/02, while on 2LNC, with BP managed with Hydralazine, amlodipine, clonidine, Beta blocker. Patient required 10mg Valium overnight for CIWA score of 9, with points added for anxiety. Patient reporting left shoulder pain, due to trauma previous to hospital admission. Work up with Xray of joint. On 11/05, Cardiology consulted for history of HFpEF. CIWA scoring 2.     REVIEW OF SYSTEMS Patient endorses diaphoresis overnight along with headache, both have resolved.   General: No fatigue, decreased apatite, weight loss  HEENT: No cough, throat pain, rhinorrhea hemoptysis    Chest: No shortness of breath, chest pain  Abdomen: No abdominal pain, hematemesis   Genitourinary: No dysuria, No hematuria   Extremities: No joint pain, no change in range of motion  Skin: No rashes, ecchymoses purpura, nodules       MEDICATIONS  MEDICATIONS  (STANDING):  amLODIPine   Tablet 10 milliGRAM(s) Oral daily  apixaban 5 milliGRAM(s) Oral every 12 hours  atorvastatin 20 milliGRAM(s) Oral at bedtime  chlorhexidine 2% Cloths 1 Application(s) Topical <User Schedule>  cloNIDine 0.1 milliGRAM(s) Oral every 8 hours  folic acid 1 milliGRAM(s) Oral daily  hydrALAZINE 50 milliGRAM(s) Oral every 8 hours  multivitamin 1 Tablet(s) Oral daily  nicotine - 21 mG/24Hr(s) Patch 21 Patch Transdermal daily  nystatin Powder 1 Application(s) Topical two times a day  QUEtiapine 25 milliGRAM(s) Oral at bedtime  sertraline 50 milliGRAM(s) Oral daily    MEDICATIONS  (PRN):  acetaminophen     Tablet .. 650 milliGRAM(s) Oral every 6 hours PRN Temp greater or equal to 38C (100.4F), Mild Pain (1 - 3)  aluminum hydroxide/magnesium hydroxide/simethicone Suspension 30 milliLiter(s) Oral every 4 hours PRN Dyspepsia  diazepam  Injectable 10 milliGRAM(s) IV Push every 2 hours PRN CIWA > 8  melatonin 3 milliGRAM(s) Oral at bedtime PRN Insomnia  ondansetron Injectable 4 milliGRAM(s) IV Push every 8 hours PRN Nausea and/or Vomiting      ALLERGIES  Allergies    No Known Allergies    Intolerances        PHYSICAL EXAM   Vital Signs Last 24 Hrs  T(C): 36.6 (06 Nov 2024 04:43), Max: 36.9 (05 Nov 2024 19:30)  T(F): 97.9 (06 Nov 2024 04:43), Max: 98.4 (05 Nov 2024 19:30)  HR: 79 (06 Nov 2024 04:43) (71 - 79)  BP: 175/96 (06 Nov 2024 04:43) (124/70 - 176/96)  BP(mean): --  RR: 18 (06 Nov 2024 04:43) (18 - 18)  SpO2: 93% (06 Nov 2024 04:43) (92% - 93%)    Parameters below as of 06 Nov 2024 04:43  Patient On (Oxygen Delivery Method): room air        T(C): 36.6 (11-06-24 @ 04:43), Max: 36.9 (11-05-24 @ 19:30)  HR: 79 (11-06-24 @ 04:43) (71 - 79)  BP: 175/96 (11-06-24 @ 04:43) (124/70 - 176/96)  RR: 18 (11-06-24 @ 04:43) (18 - 18)  SpO2: 93% (11-06-24 @ 04:43) (92% - 93%)    CONSTITUTIONAL: Well groomed, no apparent distress  EYES: PERRLA and symmetric, EOMI, No conjunctival or scleral injection, non-icteric  ENMT: Oral mucosa with moist membranes. Normal dentition; no pharyngeal injection or exudates             NECK: Supple, symmetric and without tracheal deviation   RESP: No respiratory distress, no use of accessory muscles; CTA b/l, no WRR  CV: RRR, +S1S2, no MRG; no JVD; no peripheral edema  GI: Soft, NT, ND, no rebound, no guarding; no palpable masses; no hepatosplenomegaly; no hernia palpated  LYMPH: No cervical LAD or tenderness; no axillary LAD or tenderness; no inguinal LAD or tenderness  MSK: Normal gait; No digital clubbing or cyanosis; examination of the (head/neck/spine/ribs/pelvis, RUE, LUE, RLE, LLE) without misalignment,            Normal ROM without pain, no spinal tenderness, normal muscle strength/tone  SKIN: No rashes or ulcers noted; no subcutaneous nodules or induration palpable  NEURO: CN II-XII intact; normal reflexes in upper and lower extremities, sensation intact in upper and lower extremities b/l to light touch   PSYCH: Appropriate insight/judgment; A+O x 3, mood and affect appropriate, recent/remote memory intact      LABS                        13.9   8.63  )-----------( 272      ( 05 Nov 2024 04:54 )             44.1       CBC Full  -  ( 05 Nov 2024 04:54 )  WBC Count : 8.63 K/uL  RBC Count : 5.16 M/uL  Hemoglobin : 13.9 g/dL  Hematocrit : 44.1 %  Platelet Count - Automated : 272 K/uL  Mean Cell Volume : 85.5 fl  Mean Cell Hemoglobin : 26.9 pg  Mean Cell Hemoglobin Concentration : 31.5 g/dL  Auto Neutrophil # : 6.43 K/uL  Auto Lymphocyte # : 0.68 K/uL  Auto Monocyte # : 1.15 K/uL  Auto Eosinophil # : 0.26 K/uL  Auto Basophil # : 0.05 K/uL  Auto Neutrophil % : 74.5 %  Auto Lymphocyte % : 7.9 %  Auto Monocyte % : 13.3 %  Auto Eosinophil % : 3.0 %  Auto Basophil % : 0.6 %      11-05    140  |  101  |  35.1[H]  ----------------------------<  114[H]  3.7   |  27.0  |  1.45[H]    Ca    8.9      05 Nov 2024 04:54  Phos  3.9     11-05  Mg     2.4     11-05    TPro  6.7  /  Alb  3.3  /  TBili  0.5  /  DBili  x   /  AST  14  /  ALT  12  /  AlkPhos  58  11-05      CAPILLARY BLOOD GLUCOSE              Urinalysis Basic - ( 05 Nov 2024 04:54 )    Color: x / Appearance: x / SG: x / pH: x  Gluc: 114 mg/dL / Ketone: x  / Bili: x / Urobili: x   Blood: x / Protein: x / Nitrite: x   Leuk Esterase: x / RBC: x / WBC x   Sq Epi: x / Non Sq Epi: x / Bacteria: x        IMAGING          HPI  Patient is a 64y Male with PMHx HFpEF, Afib, HTN, and alcohol abuse admitted for AHRF secondary to HFpEF exacerbation.      INTERVAL EVENTS  Patient continues to demonstrate drug seeking behavior.     HOSPITAL COURSE  Pt BIBEMS to the ED on 10/30 due to heart failure regimen noncompliance and ADHF with AHRF, requiring 02 supplementation in the ambulance. MICU was consulted on 10/31 for alcohol withdrawal symptoms including diaphoresis, agitation, and hypertensive emergency. He was placed on precedex sedation + valium taper.  TTE done on 10/31 showing LVEF 52% + LVH + dilated Left and Right atria + MR + TR. Patient downgraded to medial floors on 11/02, while on 2LNC, with BP managed with Hydralazine, amlodipine, clonidine, Beta blocker. Patient required 10mg Valium overnight for CIWA score of 9, with points added for anxiety. Patient reporting left shoulder pain, due to trauma previous to hospital admission. Work up with Xray of joint. On 11/05, Cardiology consulted for history of HFpEF. CIWA scoring 2.     REVIEW OF SYSTEMS Patient endorses diaphoresis overnight along with headache, both have resolved.   General: No fatigue, decreased apatite, weight loss  HEENT: No cough, throat pain, rhinorrhea hemoptysis    Chest: No shortness of breath, chest pain  Abdomen: No abdominal pain, hematemesis   Genitourinary: No dysuria, No hematuria   Extremities: No joint pain, no change in range of motion  Skin: No rashes, ecchymoses purpura, nodules       MEDICATIONS  MEDICATIONS  (STANDING):  amLODIPine   Tablet 10 milliGRAM(s) Oral daily  apixaban 5 milliGRAM(s) Oral every 12 hours  atorvastatin 20 milliGRAM(s) Oral at bedtime  chlorhexidine 2% Cloths 1 Application(s) Topical <User Schedule>  cloNIDine 0.1 milliGRAM(s) Oral every 8 hours  folic acid 1 milliGRAM(s) Oral daily  hydrALAZINE 50 milliGRAM(s) Oral every 8 hours  multivitamin 1 Tablet(s) Oral daily  nicotine - 21 mG/24Hr(s) Patch 21 Patch Transdermal daily  nystatin Powder 1 Application(s) Topical two times a day  QUEtiapine 25 milliGRAM(s) Oral at bedtime  sertraline 50 milliGRAM(s) Oral daily    MEDICATIONS  (PRN):  acetaminophen     Tablet .. 650 milliGRAM(s) Oral every 6 hours PRN Temp greater or equal to 38C (100.4F), Mild Pain (1 - 3)  aluminum hydroxide/magnesium hydroxide/simethicone Suspension 30 milliLiter(s) Oral every 4 hours PRN Dyspepsia  diazepam  Injectable 10 milliGRAM(s) IV Push every 2 hours PRN CIWA > 8  melatonin 3 milliGRAM(s) Oral at bedtime PRN Insomnia  ondansetron Injectable 4 milliGRAM(s) IV Push every 8 hours PRN Nausea and/or Vomiting      ALLERGIES  Allergies    No Known Allergies    Intolerances        PHYSICAL EXAM   Vital Signs Last 24 Hrs  T(C): 36.6 (06 Nov 2024 04:43), Max: 36.9 (05 Nov 2024 19:30)  T(F): 97.9 (06 Nov 2024 04:43), Max: 98.4 (05 Nov 2024 19:30)  HR: 79 (06 Nov 2024 04:43) (71 - 79)  BP: 175/96 (06 Nov 2024 04:43) (124/70 - 176/96)  BP(mean): --  RR: 18 (06 Nov 2024 04:43) (18 - 18)  SpO2: 93% (06 Nov 2024 04:43) (92% - 93%)    Parameters below as of 06 Nov 2024 04:43  Patient On (Oxygen Delivery Method): room air        T(C): 36.6 (11-06-24 @ 04:43), Max: 36.9 (11-05-24 @ 19:30)  HR: 79 (11-06-24 @ 04:43) (71 - 79)  BP: 175/96 (11-06-24 @ 04:43) (124/70 - 176/96)  RR: 18 (11-06-24 @ 04:43) (18 - 18)  SpO2: 93% (11-06-24 @ 04:43) (92% - 93%)    CONSTITUTIONAL: Well groomed, no apparent distress, out of bed to chair   EYES: bilateral eye conjunctivitis   ENMT: Oral mucosa with moist membranes. Normal dentition, dried yellow paste at corner of mouth and beard.   NECK: without tracheal deviation   RESP: No respiratory distress, no use of accessory muscles; CTA b/l, no WRR  CV: RRR, +S1S2, no MRG; no JVD; no peripheral edema  GI: Soft, NT, ND, no rebound, no guarding; bowel sounds present   MSK: Left upper arm pain present with palpation and active ROM overhead   SKIN: No rashes or ulcers noted  NEURO: sensation intact in upper and lower extremities b/l to light touch, negative for asterixis or tremor of the right arm.   PSYCH: Appropriate insight/judgment; A+O x 4, mood and affect appropriate, recent/remote memory intact    LABS                        13.9   8.63  )-----------( 272      ( 05 Nov 2024 04:54 )             44.1       CBC Full  -  ( 05 Nov 2024 04:54 )  WBC Count : 8.63 K/uL  RBC Count : 5.16 M/uL  Hemoglobin : 13.9 g/dL  Hematocrit : 44.1 %  Platelet Count - Automated : 272 K/uL  Mean Cell Volume : 85.5 fl  Mean Cell Hemoglobin : 26.9 pg  Mean Cell Hemoglobin Concentration : 31.5 g/dL  Auto Neutrophil # : 6.43 K/uL  Auto Lymphocyte # : 0.68 K/uL  Auto Monocyte # : 1.15 K/uL  Auto Eosinophil # : 0.26 K/uL  Auto Basophil # : 0.05 K/uL  Auto Neutrophil % : 74.5 %  Auto Lymphocyte % : 7.9 %  Auto Monocyte % : 13.3 %  Auto Eosinophil % : 3.0 %  Auto Basophil % : 0.6 %      11-05    140  |  101  |  35.1[H]  ----------------------------<  114[H]  3.7   |  27.0  |  1.45[H]    Ca    8.9      05 Nov 2024 04:54  Phos  3.9     11-05  Mg     2.4     11-05    TPro  6.7  /  Alb  3.3  /  TBili  0.5  /  DBili  x   /  AST  14  /  ALT  12  /  AlkPhos  58  11-05      CAPILLARY BLOOD GLUCOSE              Urinalysis Basic - ( 05 Nov 2024 04:54 )    Color: x / Appearance: x / SG: x / pH: x  Gluc: 114 mg/dL / Ketone: x  / Bili: x / Urobili: x   Blood: x / Protein: x / Nitrite: x   Leuk Esterase: x / RBC: x / WBC x   Sq Epi: x / Non Sq Epi: x / Bacteria: x        IMAGING

## 2024-11-06 NOTE — PROGRESS NOTE ADULT - ASSESSMENT
Patient is a 64y Male with PMHx HFpEF, Afib, HTN, and alcohol abuse admitted for AHRF secondary to HFpEF exacerbation.      #AHRF secondary to ADHFpEF  -TTE 10/31 showing LVEF 52% + LVH +dilated left and right atria + MR + TR  -currently on room air   -Furosemide paused due to improving overload status   -Cardiology consulted     #Alcohol withdrawal  -CIWA protocol  -Valium 10mg BID   -Valium 10mg q2 prn for CIWA > 8  -Thiamine 500mg TID   -Folic acid 1mg daily   -Multivitamin    #HTN  -Transferred to MICU for hypertensive urgency in the setting of alcohol withdrawal   -Amlodipine 10mg daily   -Clonidine 0.1mg TID   -hydralazine 50mg TID     #Afib   -Eliquis 5mg BID       #CKD   -Baseline Cr 1.2-1.3   -Renal US negative for hydronephrosis   -Furosemide held   -serial CMPs     #Lung nodule on CT Chest   -Right hilar lymph node enlargement   -Pulm follow up outpatient     #Depression Anxiety   -Sertraline 50mg daily   -Quetiapine 25mg bedtime     DVT ppx Eliquis 5mg BID   Dispo: PT valentín recommending PATRICK        Patient is a 64y Male with PMHx HFpEF, Afib, HTN, and alcohol abuse admitted for AHRF secondary to HFpEF exacerbation.      #AHRF secondary to ADHFpEF  -TTE 10/31 showing LVEF 52% + LVH +dilated left and right atria + MR + TR  -currently on room air   -Furosemide paused due to improving overload status   -Cardiology consulted     #Alcohol withdrawal  -CIWA protocol  -Valium 10mg BID   -Valium 10mg q2 prn for CIWA > 8  -Thiamine 500mg TID   -Folic acid 1mg daily   -Multivitamin      #LUE pain  -likely secondary to traumatic injury prior to hosp admission  -Xray Left shoulder negative for fracture   -Recommending physical therapy     #HTN  -Transferred to MICU for hypertensive urgency in the setting of alcohol withdrawal   -Amlodipine 10mg daily   -Clonidine 0.1mg TID   -hydralazine 50mg TID     #Afib   -Eliquis 5mg BID       #CKD   -Baseline Cr 1.2-1.3   -Renal US negative for hydronephrosis   -Furosemide held   -serial CMPs     #Lung nodule on CT Chest   -Right hilar lymph node enlargement   -Pulm follow up outpatient     #Depression Anxiety   -Sertraline 50mg daily   -Quetiapine 25mg bedtime     DVT ppx Eliquis 5mg BID   Dispo: PT valentín recommending PATRICK

## 2024-11-06 NOTE — ED ADULT TRIAGE NOTE - CHIEF COMPLAINT QUOTE
Pt discharged from [facility] today to [facility]. As per EMS upon arrival to [place] pt refused to exit the ambulance, and stated he needed to come back to ED because he wanted to smoke a cigarette. Pt stated he came back because he wants to buy a walker, due to being unsteady on his feet

## 2024-11-06 NOTE — CHART NOTE - NSCHARTNOTEFT_GEN_A_CORE
Palliative care social work note.    SW and palliative care NP met with patient to provide support regarding dx and address goals further. Patient acknowledges that he is hoping for discharge soon. Patient lives in [facility] subsidized housing apartment with FREE. Patient acknowledges that his sister Latrice lives in [place] but she is involved. Patient reports his other sister estranged. Education regarding HCP provided and patient completed form appointing sister Latrice. Patient verbalizes that he does not want aggressive measures like CPR and intubation when at  end of life. Patient understands that at present his condition is not at end stage and encouraged him to have further discussions with his sister to advise of his wishes.

## 2024-11-06 NOTE — ED PROVIDER NOTE - NSFOLLOWUPINSTRUCTIONS_ED_ALL_ED_FT
Go directly to [facility] for rehabilitation     return here or go to the nearest emergency department if you develop new symptoms of acute concern such as severe shortness of breath, chest pain, or other new worries.

## 2024-11-06 NOTE — ED PROVIDER NOTE - NSFOLLOWUPCLINICS_GEN_ALL_ED_FT
[facility] [id number] [place] [address]  Phone: [phone]  Fax:   Follow Up Time: 4-6 Days

## 2024-11-06 NOTE — CONSULT NOTE ADULT - ASSESSMENT
64 year old admitted with HTN emergency/ETOH withdrawal, palliative consulted as patient meetvs STAR criteria for CHF    Problem/Recommendation 1:Dyspnea  Improving   Underlying CHF  Cardiology consulted/following  TTE completed 10-31  monitor I+O  monitor signs/symptoms dyspnea  Noted CT CHest 11.2.24  Small left and trace right pleural effusions.    Problem/Recommendation 2: Weakness  Assist in ADLS  Maintain safety, fall, aspiration precautions  Set bed alarm, chair alarm for safety and fall prevention  Turn and Position in bed     Problem/Recommendation 3: Palliative Care Encounter  Met with patient at bedside, introduced Palliative Care Team and Services at [facility].  No c/o discomforts, patient AO x 3  Patient acknowledges his wishes for Full Code now but he does go on to explain that at the end of life he wouldnt want aggressive measures like CPR and intubation but that his sister and him should continue these important conversations if this is how he feels in the event something would happen leaving him in a critical state that would require these interventions   Patient designates his sister Latrice as HCP he is able to identify her as a sister who he cares for and knows him well, he believes she would act in his best interest  HCP form completed with second witness Gabrielle Good LCSW who was at bedside during encounter  See Chart note from palliative  gabrielle for more information on our encounter  GOC have been established, no further needs to be addressed from Palliative care perspective.  Will sign off at this time. Please reconsult if GOC change or condition decompensates requiring further palliative care assistance.  Surrogate/HCP/Guardian: Phone#:Latrice Cervantes sister/hcp  [phone]  Code Status:         full code                        Total Time Spent_55_ minutes  Total time also includes discussion during interdisciplinary team rounds, chart review including but limited to prior admissions/   review of medications/ labs/ imaging, examination, care coordination with other health care professionals, documentation EXCLUDING advance care planning discussions.       COUNSELING:  Face to face meeting to discuss Advanced Care Planning - Time Spent ___18___Minutes.      Thank you for the opportunity to assist with the care of this patient.   [facility] Palliative Medicine Consult Service [phone]. 
ASSESSMENT  Pt is a 65 y/o M PMH of etoh abuse (hx of hospitalizations for withdrawal, no hx of withdraw seizures), HFpEF, HTN presented to the ED c/o SOB and admitted to medicine for CHF exacerbation and EtOH withdrawal.  On evaluation by medicine team this morning, pt appears to be withdrawing. In total up to this point pt has received 100 librium, 4 ativan. Noted to be hypertensive SBP 200s, given clonidine 0.1 and 10 IV labetalol, without improvement. MICU consulted for EtOH withdrawal, agitation, hypertensive emergency      PLAN:    NEURO:  ETOH withdrawal  - c/w precedex 0.3 mcg/kg/h  - c/w diazepam 20 mg IVP q4h PRN  - CIWA  - neuro checks q4h  - acteaminophen 650mg PO q6h prn for fever    CV:  #HTN  #CHF  - Maintain MAP>65  - Cardiac monitor  - c/w hydralazine 10 mg IVP q4h  - c/w nicardipine infusion at 25 mL/hr  - c/w clonidine 0.1 mg PO q4, day 1/3  - Goal of K>4, Phos >3, Mag >2. Replete as needed  - f/u TTE  - f/u EEG  - cardiology consulted, followed recs    RESP:  - Sating well on RA, continue to monitor on   - Aspiration precautions   - c/w lasix 40mg IBP q12h for pulm edema 2/2 CHF    GI:  - Protonix for GI ppx  - zofran 4mg IVP q8h prn for nausea  - c/w thiamine  - c/w folate  - c/w multivitamin  - f/u hepatic panel  - Dietician consulted, following recs      RENAL:  - condom cath  - monitor I/Os  - trend CMP    ID:  - c/w nystatin powder , topical BID for groin  - f/u MRSA/MSSA PCR  - trend CBC    Heme:  - f/u AM labs    Endo:  - maintain blood sugars 100-200    DVT: Lovenox 80 mg SC q12h  Diet: NPO  Code Status: Full Code  Dispo: MICU    Case discussed with MICU Attending: Dr. Orr

## 2024-11-06 NOTE — ED PROVIDER NOTE - CLINICAL SUMMARY MEDICAL DECISION MAKING FREE TEXT BOX
64M PMH hx of alcohol use s/p withdrawal, heart failure regimen noncompliance and ADHF with AHRF, recent admission for CHF with hypoxia, and alcohol withdrawal, admitted to MICU, Sent in by rehab center as patient states he did not want to be in the rehab because he wanted to smoke a cigarette.  On initial ED evaluation, patient without any complaints,  patient vitals stable.  Patient states he is apologetic for acting the way he did, acknowledges that he needs to rehab because he is having difficulty walking and would benefit from the physical therapy at the rehab.  Patient is without any new complaints and is amenable to returning to rehab.  Physical exam shows well-appearing male, no respiratory distress, regular rate and rhythm, lungs clear to auscultation.  Abdomen soft and nontender.  No pitting edema bilateral lower extremities, distal pulses intact.  Neuro intact.  Given history and physical differential includes  Deconditioning, generalized weakness, known heart failure, low concern for acute CHF exacerbation, no signs of fluid overload on my exam, hemodynamically stable, asymptomatic. low concern for alcohol withdrawal (patient without any signs of withdrawal clinically, patient recently admitted in detox in the hospital for alcohol withdrawal, patient discharge earlier in the day).  Plan for social work to help facilitate transfer back to rehab.

## 2024-11-06 NOTE — ED PROVIDER NOTE - PROGRESS NOTE DETAILS
Fanny Lozano DO (Attending):   Spoke with rehab facility, unable to accept patient back as the supervisor is not in to discuss this case.  Patient will require observation overnight with social work in the morning to help facilitate transfer back to rehab facility.  Patient signed out to night team. Fanny Lozano DO (Attending):   Spoke with rehab facility, unable to accept patient back as he is a new patient and the supervisor is not in to discuss this case.  Patient will require observation overnight with social work in the morning to help facilitate transfer back to rehab facility.  Patient signed out to night team. Riccardo: Pt received in signout from Dr. Lozano. Pt stable, pending SW in AM to facilitate discharge to [place]. SUDARSHAN Murrell: Social work able to obtain transportation back to [place], patient accepted back to the facility, will write for DC now

## 2024-11-06 NOTE — ED PROVIDER NOTE - PATIENT PORTAL LINK FT
You can access the FollowMyHealth Patient Portal offered by [facility] by registering at the following website: [email or web address]. By joining [facility]’s FollowMyHealth portal, you will also be able to view your health information using other applications (apps) compatible with our system.

## 2024-11-07 VITALS
TEMPERATURE: 99 F | SYSTOLIC BLOOD PRESSURE: 120 MMHG | RESPIRATION RATE: 20 BRPM | OXYGEN SATURATION: 95 % | DIASTOLIC BLOOD PRESSURE: 78 MMHG | HEART RATE: 66 BPM

## 2024-11-07 RX ORDER — SERTRALINE HYDROCHLORIDE 50 MG/1
50 TABLET, FILM COATED ORAL DAILY
Refills: 0 | Status: ACTIVE | OUTPATIENT
Start: 2024-11-07 | End: 2025-10-06

## 2024-11-07 RX ORDER — CLONIDINE HYDROCHLORIDE 0.2 MG/1
0.1 TABLET ORAL
Refills: 0 | Status: ACTIVE | OUTPATIENT
Start: 2024-11-07 | End: 2025-10-06

## 2024-11-07 RX ORDER — CLONAZEPAM 1 MG
0.5 TABLET ORAL
Refills: 0 | Status: COMPLETED | OUTPATIENT
Start: 2024-11-07 | End: 2024-11-14

## 2024-11-07 RX ORDER — HYDRALAZINE HYDROCHLORIDE 50 MG/1
10 TABLET, FILM COATED ORAL THREE TIMES A DAY
Refills: 0 | Status: ACTIVE | OUTPATIENT
Start: 2024-11-07 | End: 2025-10-06

## 2024-11-07 RX ORDER — AMLODIPINE BESYLATE 10 MG
10 TABLET ORAL DAILY
Refills: 0 | Status: ACTIVE | OUTPATIENT
Start: 2024-11-07 | End: 2025-10-06

## 2024-11-07 RX ORDER — METOPROLOL TARTRATE 50 MG
50 TABLET ORAL
Refills: 0 | Status: ACTIVE | OUTPATIENT
Start: 2024-11-07 | End: 2025-10-06

## 2024-11-07 RX ORDER — LISINOPRIL 40 MG
40 TABLET ORAL DAILY
Refills: 0 | Status: ACTIVE | OUTPATIENT
Start: 2024-11-07 | End: 2025-10-06

## 2024-11-07 RX ORDER — NICOTINE POLACRILEX 4 MG/1
1 GUM, CHEWING ORAL ONCE
Refills: 0 | Status: COMPLETED | OUTPATIENT
Start: 2024-11-07 | End: 2024-11-07

## 2024-11-07 RX ORDER — APIXABAN 5 MG/1
5 TABLET, FILM COATED ORAL
Refills: 0 | Status: ACTIVE | OUTPATIENT
Start: 2024-11-07 | End: 2025-10-06

## 2024-11-07 RX ADMIN — Medication 10 MILLIGRAM(S): at 06:15

## 2024-11-07 RX ADMIN — HYDRALAZINE HYDROCHLORIDE 10 MILLIGRAM(S): 50 TABLET, FILM COATED ORAL at 06:15

## 2024-11-07 RX ADMIN — CLONIDINE HYDROCHLORIDE 0.1 MILLIGRAM(S): 0.2 TABLET ORAL at 06:14

## 2024-11-07 RX ADMIN — SERTRALINE HYDROCHLORIDE 50 MILLIGRAM(S): 50 TABLET, FILM COATED ORAL at 16:12

## 2024-11-07 RX ADMIN — HYDRALAZINE HYDROCHLORIDE 10 MILLIGRAM(S): 50 TABLET, FILM COATED ORAL at 16:12

## 2024-11-07 RX ADMIN — NICOTINE POLACRILEX 1 PATCH: 4 GUM, CHEWING ORAL at 12:00

## 2024-11-07 RX ADMIN — Medication 0.5 MILLIGRAM(S): at 06:15

## 2024-11-07 RX ADMIN — Medication 40 MILLIGRAM(S): at 06:14

## 2024-11-07 RX ADMIN — Medication 50 MILLIGRAM(S): at 06:15

## 2024-11-07 NOTE — ED ADULT NURSE NOTE - OBJECTIVE STATEMENT
assumed care of pt from triage, pt AAOX3, resp. even and unlabored on RA, pt endorses he was here earlier today because he needed help with walking, pt was sent to [place] for rehab and then came back to [facility] this evening because he can't walk without a walker and he needs one, pt awaiting to see SW in the AM, pt given a blanket and a pillow, pt resting comfortably in stretcher at this time

## 2024-11-07 NOTE — CHART NOTE - NSCHARTNOTEFT_GEN_A_CORE
Social Work Note:  Clinicals circulated to [facility] and a phone call and message out to them inquiring if they can accept the patient back.  SW will continue to follow for transfer to [place].  Patient was sent from  yesterday to [place]. EMS team brought patient back to ED as he requested to smoke a cigarette in the parking lot.  SW will follow for hopeful return.

## 2024-11-07 NOTE — ED ADULT NURSE NOTE - SUICIDE SCREENING QUESTION 1
Patient Will Remove Sutures At Home?: No Additional Anesthesia Volume In Cc (Will Not Render If 0): 0 Was A Bandage Applied: Yes Suture Removal: 12 days Consent: Written consent was obtained and risks were reviewed including but not limited to scarring, infection, bleeding, scabbing, incomplete removal, nerve damage and allergy to anesthesia. Post-Care Instructions: I reviewed with the patient in detail post-care instructions. Patient is to keep the biopsy site dry overnight, and then apply bacitracin twice daily until healed. Patient may apply hydrogen peroxide soaks to remove any crusting. Dressing: bandage Home Suture Removal Text: Patient was provided a home suture removal kit and will remove their sutures at home.  If they have any questions or difficulties they will call the office. Anesthesia Type: 1% lidocaine without epinephrine Hemostasis: None Detail Level: Simple Epidermal Sutures: 4-0 Nylon No Notification Instructions: Patient will be notified of biopsy results. However, patient instructed to call the office if not contacted within 2 weeks. Wound Care: Bacitracin Biopsy Type: H and E Lab: -[id number] Anesthesia Volume In Cc (Will Not Render If 0): 1 Body Location Override (Optional - Billing Will Still Be Based On Selected Body Map Location If Applicable): right scalp Punch Size In Mm: 2 Billing Type: Third-Party Bill Biopsy Type: DIF

## 2024-11-07 NOTE — ED PEDIATRIC NURSE REASSESSMENT NOTE - NS ED NURSE REASSESS COMMENT FT2
Assumed care of pt. Pt axo3. Respirations even and unlabored. Requesting nicotine patch. Given as per orders. No other complaints.

## 2024-11-07 NOTE — ED ADULT NURSE REASSESSMENT NOTE - NS ED NURSE REASSESS COMMENT FT1
Pt axo3. Respirations even and unlabored. Pt educated that he is waiting for ambulance back to [place].

## 2024-11-07 NOTE — ED ADULT NURSE REASSESSMENT NOTE - NS ED NURSE REASSESS COMMENT FT1
Pt care assumed @ this time. Pt resting in bed. VSS. Resp even and unlabored. Awaiting SW at this time. Pt updated on plan of care and verbalizes understanding.

## 2024-11-07 NOTE — CHART NOTE - NSCHARTNOTEFT_GEN_A_CORE
Social Work Note:  Escalated the case to Luther from [facility] to discuss providing patient a second opportunity to go back to [place]. Made him aware that patient has no behaviors and is very apologetic for episode.  Luther reported he would accept patient back as long as he can send him back to [facility] should behaviors arise.  Ambulance arranged for 1 pm and transport billing form provided to the patient.  ED staff aware.

## 2024-11-07 NOTE — ED ADULT NURSE NOTE - NSFALLUNIVINTERV_ED_ALL_ED
Bed/Stretcher in lowest position, wheels locked, appropriate side rails in place/Call bell, personal items and telephone in reach/Instruct patient to call for assistance before getting out of bed/chair/stretcher/Non-slip footwear applied when patient is off stretcher/[place] to call system/Physically safe environment - no spills, clutter or unnecessary equipment/Purposeful proactive rounding/Room/bathroom lighting operational, light cord in reach

## 2024-11-07 NOTE — ED ADULT NURSE NOTE - CHIEF COMPLAINT QUOTE
Pt discharged from [facility] today to [facility]. As per EMS upon arrival to [place] pt refused to exit the ambulance, and stated he needed to come back to ED because he wanted to smoke a cigarette. Pt stated he came back because he wants to buy a walker, due to being unsteady on his feet

## 2024-11-15 ENCOUNTER — TRANSCRIPTION ENCOUNTER (OUTPATIENT)
Age: 64
End: 2024-11-15

## 2024-11-19 ENCOUNTER — APPOINTMENT (OUTPATIENT)
Dept: CARDIOLOGY | Facility: CLINIC | Age: 64
End: 2024-11-19

## 2024-11-20 PROCEDURE — 99284 EMERGENCY DEPT VISIT MOD MDM: CPT

## 2024-11-26 PROCEDURE — 84132 ASSAY OF SERUM POTASSIUM: CPT

## 2024-11-26 PROCEDURE — 71250 CT THORAX DX C-: CPT | Mod: MC

## 2024-11-26 PROCEDURE — 73030 X-RAY EXAM OF SHOULDER: CPT

## 2024-11-26 PROCEDURE — 70450 CT HEAD/BRAIN W/O DYE: CPT | Mod: MC

## 2024-11-26 PROCEDURE — 87641 MR-STAPH DNA AMP PROBE: CPT

## 2024-11-26 PROCEDURE — 83690 ASSAY OF LIPASE: CPT

## 2024-11-26 PROCEDURE — 83880 ASSAY OF NATRIURETIC PEPTIDE: CPT

## 2024-11-26 PROCEDURE — 85025 COMPLETE CBC W/AUTO DIFF WBC: CPT

## 2024-11-26 PROCEDURE — 76775 US EXAM ABDO BACK WALL LIM: CPT

## 2024-11-26 PROCEDURE — 99285 EMERGENCY DEPT VISIT HI MDM: CPT

## 2024-11-26 PROCEDURE — 85730 THROMBOPLASTIN TIME PARTIAL: CPT

## 2024-11-26 PROCEDURE — 93005 ELECTROCARDIOGRAM TRACING: CPT

## 2024-11-26 PROCEDURE — 84295 ASSAY OF SERUM SODIUM: CPT

## 2024-11-26 PROCEDURE — 83605 ASSAY OF LACTIC ACID: CPT

## 2024-11-26 PROCEDURE — 80307 DRUG TEST PRSMV CHEM ANLYZR: CPT

## 2024-11-26 PROCEDURE — 84100 ASSAY OF PHOSPHORUS: CPT

## 2024-11-26 PROCEDURE — 84443 ASSAY THYROID STIM HORMONE: CPT

## 2024-11-26 PROCEDURE — 97530 THERAPEUTIC ACTIVITIES: CPT

## 2024-11-26 PROCEDURE — 85027 COMPLETE CBC AUTOMATED: CPT

## 2024-11-26 PROCEDURE — 71045 X-RAY EXAM CHEST 1 VIEW: CPT

## 2024-11-26 PROCEDURE — 85018 HEMOGLOBIN: CPT

## 2024-11-26 PROCEDURE — 87640 STAPH A DNA AMP PROBE: CPT

## 2024-11-26 PROCEDURE — 82947 ASSAY GLUCOSE BLOOD QUANT: CPT

## 2024-11-26 PROCEDURE — 93306 TTE W/DOPPLER COMPLETE: CPT

## 2024-11-26 PROCEDURE — 97163 PT EVAL HIGH COMPLEX 45 MIN: CPT

## 2024-11-26 PROCEDURE — 85014 HEMATOCRIT: CPT

## 2024-11-26 PROCEDURE — 0241U: CPT

## 2024-11-26 PROCEDURE — 82435 ASSAY OF BLOOD CHLORIDE: CPT

## 2024-11-26 PROCEDURE — 82330 ASSAY OF CALCIUM: CPT

## 2024-11-26 PROCEDURE — 82550 ASSAY OF CK (CPK): CPT

## 2024-11-26 PROCEDURE — 82803 BLOOD GASES ANY COMBINATION: CPT

## 2024-11-26 PROCEDURE — 96372 THER/PROPH/DIAG INJ SC/IM: CPT

## 2024-11-26 PROCEDURE — 82140 ASSAY OF AMMONIA: CPT

## 2024-11-26 PROCEDURE — 36415 COLL VENOUS BLD VENIPUNCTURE: CPT

## 2024-11-26 PROCEDURE — 85610 PROTHROMBIN TIME: CPT

## 2024-11-26 PROCEDURE — 80053 COMPREHEN METABOLIC PANEL: CPT

## 2024-11-26 PROCEDURE — 97110 THERAPEUTIC EXERCISES: CPT

## 2024-11-26 PROCEDURE — 83735 ASSAY OF MAGNESIUM: CPT

## 2024-11-26 PROCEDURE — 80048 BASIC METABOLIC PNL TOTAL CA: CPT

## 2024-11-26 PROCEDURE — 84484 ASSAY OF TROPONIN QUANT: CPT

## 2024-11-26 PROCEDURE — 87637 SARSCOV2&INF A&B&RSV AMP PRB: CPT

## 2024-12-19 ENCOUNTER — APPOINTMENT (OUTPATIENT)
Dept: CARDIOLOGY | Facility: CLINIC | Age: 64
End: 2024-12-19
Payer: MEDICARE

## 2024-12-19 ENCOUNTER — NON-APPOINTMENT (OUTPATIENT)
Age: 64
End: 2024-12-19

## 2024-12-19 VITALS
BODY MASS INDEX: 33.35 KG/M2 | HEART RATE: 66 BPM | DIASTOLIC BLOOD PRESSURE: 98 MMHG | RESPIRATION RATE: 16 BRPM | WEIGHT: 215 LBS | HEIGHT: 67.5 IN | SYSTOLIC BLOOD PRESSURE: 168 MMHG

## 2024-12-19 DIAGNOSIS — I10 ESSENTIAL (PRIMARY) HYPERTENSION: ICD-10-CM

## 2024-12-19 DIAGNOSIS — R94.31 ABNORMAL ELECTROCARDIOGRAM [ECG] [EKG]: ICD-10-CM

## 2024-12-19 DIAGNOSIS — E78.00 PURE HYPERCHOLESTEROLEMIA, UNSPECIFIED: ICD-10-CM

## 2024-12-19 DIAGNOSIS — I48.0 PAROXYSMAL ATRIAL FIBRILLATION: ICD-10-CM

## 2024-12-19 DIAGNOSIS — R06.09 OTHER FORMS OF DYSPNEA: ICD-10-CM

## 2024-12-19 PROCEDURE — 99214 OFFICE O/P EST MOD 30 MIN: CPT

## 2024-12-19 PROCEDURE — G2211 COMPLEX E/M VISIT ADD ON: CPT

## 2024-12-19 PROCEDURE — 93000 ELECTROCARDIOGRAM COMPLETE: CPT

## 2024-12-25 PROBLEM — F10.90 ALCOHOL USE: Status: ACTIVE | Noted: 2022-05-23

## 2025-01-30 ENCOUNTER — APPOINTMENT (OUTPATIENT)
Dept: CARDIOLOGY | Facility: CLINIC | Age: 65
End: 2025-01-30

## 2025-03-17 NOTE — PATIENT PROFILE ADULT - PATIENT'S SEXUAL ORIENTATION
RX PROGRESS NOTE: Vancomycin Therapeutic Drug Monitoring    Day of therapy: 3    Indication and target trough: Non-necrotizing SSTI (10-15 mcg/mL)    Current vancomycin dosing regimen: 1250 mg IVPB every 12 hours    Most recent height and weight information:  Weight: 108.9 kg (03/13/25 2259)  Height: 6' 5\" (195.6 cm) (03/13/25 2259)    The Following are the Calculated  Current Weights for Jr Albert       Adjusted Ideal    97 kg 89.1 kg            Labs:  Serum Creatinine and Creatinine Clearance:  Serum creatinine: 0.56 mg/dL (L) 03/17/25 0533  Estimated creatinine clearance: 120 mL/min (A)    Vancomycin Serum Concentrations:  Vancomycin, Trough (mcg/mL)   Date/Time Value   03/17/2025 0533 10.3       Assessment:  Serum concentration of 10.3 mcg/mL after the 3rd dose.   Based on the serum concentration, will keep regimen at vancomycin 1250 mg IVPB every 12 hours.    Additional serum concentrations may be necessary depending on pathogen identified, risk factors for adverse events, and/or duration of therapy.  Pharmacy will continue to follow and adjust as needed.    Thank you,    Delmis Pearson RPH  3/17/2025 6:27 AM     Withheld/Decline to Answer

## 2025-03-24 ENCOUNTER — NON-APPOINTMENT (OUTPATIENT)
Age: 65
End: 2025-03-24

## 2025-04-24 NOTE — ED ADULT NURSE NOTE - PASSIVE COMMENT
Leave splint in place until seen by your orthopedic physician.  Keep the affected extremity elevated as much as possible.  Take OTC Tylenol as directed for discomfort.  As discussed, it is important that you use your rollator at all times to help prevent future falls.    Return for high fever, incessant vomiting, severe pain, or any other worsening symptoms.  
1 pack per day